# Patient Record
Sex: FEMALE | Race: WHITE | NOT HISPANIC OR LATINO | Employment: FULL TIME | URBAN - METROPOLITAN AREA
[De-identification: names, ages, dates, MRNs, and addresses within clinical notes are randomized per-mention and may not be internally consistent; named-entity substitution may affect disease eponyms.]

---

## 2018-04-30 ENCOUNTER — HISTORICAL (OUTPATIENT)
Dept: LAB | Facility: HOSPITAL | Age: 23
End: 2018-04-30

## 2018-06-20 LAB
BILIRUB SERPL-MCNC: NEGATIVE MG/DL
BLOOD URINE, POC: NEGATIVE
CLARITY, POC UA: CLEAR
COLOR, POC UA: YELLOW
GLUCOSE UR QL STRIP: NEGATIVE
KETONES UR QL STRIP: NEGATIVE
LEUKOCYTE EST, POC UA: NEGATIVE
NITRITE, POC UA: NEGATIVE
PH, POC UA: 6
PROTEIN, POC: NEGATIVE
SPECIFIC GRAVITY, POC UA: 1.02
UROBILINOGEN, POC UA: NORMAL

## 2018-06-21 ENCOUNTER — HISTORICAL (OUTPATIENT)
Dept: RADIOLOGY | Facility: HOSPITAL | Age: 23
End: 2018-06-21

## 2018-08-27 ENCOUNTER — HISTORICAL (OUTPATIENT)
Dept: ANESTHESIOLOGY | Facility: HOSPITAL | Age: 23
End: 2018-08-27

## 2018-09-11 LAB
ABS NEUT (OLG): 4.33 X10(3)/MCL (ref 2.1–9.2)
ALBUMIN SERPL-MCNC: 4.3 GM/DL (ref 3.4–5)
ALBUMIN/GLOB SERPL: 1.4 {RATIO}
ALP SERPL-CCNC: 53 UNIT/L (ref 38–126)
ALT SERPL-CCNC: 35 UNIT/L (ref 12–78)
AST SERPL-CCNC: 27 UNIT/L (ref 15–37)
BASOPHILS # BLD AUTO: 0 X10(3)/MCL (ref 0–0.2)
BASOPHILS NFR BLD AUTO: 1 %
BILIRUB SERPL-MCNC: 0.5 MG/DL (ref 0.2–1)
BILIRUBIN DIRECT+TOT PNL SERPL-MCNC: 0.1 MG/DL (ref 0–0.2)
BILIRUBIN DIRECT+TOT PNL SERPL-MCNC: 0.4 MG/DL (ref 0–0.8)
BUN SERPL-MCNC: 9 MG/DL (ref 7–18)
CALCIUM SERPL-MCNC: 9.1 MG/DL (ref 8.5–10.1)
CHLORIDE SERPL-SCNC: 110 MMOL/L (ref 98–107)
CO2 SERPL-SCNC: 23 MMOL/L (ref 21–32)
CREAT SERPL-MCNC: 0.59 MG/DL (ref 0.55–1.02)
EOSINOPHIL # BLD AUTO: 0.1 X10(3)/MCL (ref 0–0.9)
EOSINOPHIL NFR BLD AUTO: 1 %
ERYTHROCYTE [DISTWIDTH] IN BLOOD BY AUTOMATED COUNT: 12 % (ref 11.5–17)
GLOBULIN SER-MCNC: 3.1 GM/DL (ref 2.4–3.5)
GLUCOSE SERPL-MCNC: 80 MG/DL (ref 74–106)
HCT VFR BLD AUTO: 41.2 % (ref 37–47)
HGB BLD-MCNC: 13.4 GM/DL (ref 12–16)
LYMPHOCYTES # BLD AUTO: 2.8 X10(3)/MCL (ref 0.6–4.6)
LYMPHOCYTES NFR BLD AUTO: 36 %
MCH RBC QN AUTO: 31.2 PG (ref 27–31)
MCHC RBC AUTO-ENTMCNC: 32.5 GM/DL (ref 33–36)
MCV RBC AUTO: 96 FL (ref 80–94)
MONOCYTES # BLD AUTO: 0.5 X10(3)/MCL (ref 0.1–1.3)
MONOCYTES NFR BLD AUTO: 6 %
NEUTROPHILS # BLD AUTO: 4.33 X10(3)/MCL (ref 1.4–7.9)
NEUTROPHILS NFR BLD AUTO: 56 %
PLATELET # BLD AUTO: 309 X10(3)/MCL (ref 130–400)
PMV BLD AUTO: 10.3 FL (ref 9.4–12.4)
POTASSIUM SERPL-SCNC: 4.3 MMOL/L (ref 3.5–5.1)
PROT SERPL-MCNC: 7.4 GM/DL (ref 6.4–8.2)
RBC # BLD AUTO: 4.29 X10(6)/MCL (ref 4.2–5.4)
SODIUM SERPL-SCNC: 143 MMOL/L (ref 136–145)
WBC # SPEC AUTO: 7.8 X10(3)/MCL (ref 4.5–11.5)

## 2018-09-19 ENCOUNTER — HISTORICAL (OUTPATIENT)
Dept: ADMINISTRATIVE | Facility: HOSPITAL | Age: 23
End: 2018-09-19

## 2018-11-08 LAB
INFLUENZA A ANTIGEN, POC: POSITIVE
INFLUENZA B ANTIGEN, POC: NEGATIVE

## 2019-01-16 LAB
INFLUENZA A ANTIGEN, POC: NEGATIVE
INFLUENZA B ANTIGEN, POC: NEGATIVE

## 2019-03-08 ENCOUNTER — HISTORICAL (OUTPATIENT)
Dept: LAB | Facility: HOSPITAL | Age: 24
End: 2019-03-08

## 2019-03-08 LAB
ABS NEUT (OLG): 2.41 X10(3)/MCL (ref 2.1–9.2)
ALBUMIN SERPL-MCNC: 3.8 GM/DL (ref 3.4–5)
ALBUMIN/GLOB SERPL: 1.4 RATIO (ref 1.1–2)
ALP SERPL-CCNC: 47 UNIT/L (ref 38–126)
ALT SERPL-CCNC: 27 UNIT/L (ref 12–78)
AST SERPL-CCNC: 17 UNIT/L (ref 15–37)
BASOPHILS # BLD AUTO: 0 X10(3)/MCL (ref 0–0.2)
BASOPHILS NFR BLD AUTO: 1 %
BILIRUB SERPL-MCNC: 0.4 MG/DL (ref 0.2–1)
BILIRUBIN DIRECT+TOT PNL SERPL-MCNC: 0.1 MG/DL (ref 0–0.5)
BILIRUBIN DIRECT+TOT PNL SERPL-MCNC: 0.3 MG/DL (ref 0–0.8)
BUN SERPL-MCNC: 15 MG/DL (ref 7–18)
CALCIUM SERPL-MCNC: 9.3 MG/DL (ref 8.5–10.1)
CHLORIDE SERPL-SCNC: 110 MMOL/L (ref 98–107)
CO2 SERPL-SCNC: 22 MMOL/L (ref 21–32)
CREAT SERPL-MCNC: 0.6 MG/DL (ref 0.55–1.02)
EOSINOPHIL # BLD AUTO: 0.1 X10(3)/MCL (ref 0–0.9)
EOSINOPHIL NFR BLD AUTO: 1 %
ERYTHROCYTE [DISTWIDTH] IN BLOOD BY AUTOMATED COUNT: 12.3 % (ref 11.5–17)
GLOBULIN SER-MCNC: 2.8 GM/DL (ref 2.4–3.5)
GLUCOSE SERPL-MCNC: 78 MG/DL (ref 74–106)
HCT VFR BLD AUTO: 39.4 % (ref 37–47)
HGB BLD-MCNC: 12.6 GM/DL (ref 12–16)
LYMPHOCYTES # BLD AUTO: 3.7 X10(3)/MCL (ref 0.6–4.6)
LYMPHOCYTES NFR BLD AUTO: 56 %
MCH RBC QN AUTO: 30.3 PG (ref 27–31)
MCHC RBC AUTO-ENTMCNC: 32 GM/DL (ref 33–36)
MCV RBC AUTO: 94.7 FL (ref 80–94)
MONOCYTES # BLD AUTO: 0.4 X10(3)/MCL (ref 0.1–1.3)
MONOCYTES NFR BLD AUTO: 6 %
NEUTROPHILS # BLD AUTO: 2.41 X10(3)/MCL (ref 2.1–9.2)
NEUTROPHILS NFR BLD AUTO: 36 %
PLATELET # BLD AUTO: 291 X10(3)/MCL (ref 130–400)
PMV BLD AUTO: 10.7 FL (ref 9.4–12.4)
POTASSIUM SERPL-SCNC: 4 MMOL/L (ref 3.5–5.1)
PROT SERPL-MCNC: 6.6 GM/DL (ref 6.4–8.2)
RBC # BLD AUTO: 4.16 X10(6)/MCL (ref 4.2–5.4)
SODIUM SERPL-SCNC: 141 MMOL/L (ref 136–145)
WBC # SPEC AUTO: 6.6 X10(3)/MCL (ref 4.5–11.5)

## 2019-03-19 ENCOUNTER — HISTORICAL (OUTPATIENT)
Dept: RADIOLOGY | Facility: HOSPITAL | Age: 24
End: 2019-03-19

## 2019-07-29 ENCOUNTER — HISTORICAL (OUTPATIENT)
Dept: ADMINISTRATIVE | Facility: HOSPITAL | Age: 24
End: 2019-07-29

## 2019-10-23 ENCOUNTER — TELEPHONE (OUTPATIENT)
Dept: PHARMACY | Facility: CLINIC | Age: 24
End: 2019-10-23

## 2019-10-23 NOTE — TELEPHONE ENCOUNTER
LVM to notify the patient we received the prescription for Hizentra, and to see if she has any active prescription insurance. We will continue to follow up.

## 2019-11-14 NOTE — TELEPHONE ENCOUNTER
DOCUMENTATION ONLY:  Prior authorization for Hizentra approved from 11/7/19 to 11/6/20 for a maximum of 12 fills.    Case Id: [id number]    Co-pay: $465.00    Patient Assistance is required.

## 2019-12-30 NOTE — TELEPHONE ENCOUNTER
Contacted patient to check status of insurance for [employer]. She reports her new plan will be effective 1/1/20 and she will contact [facility] once she has her plan info. She has the contact info for [employer] but has not called yet to enroll as she is waiting for new plan to take effect. Will continue to follow up.

## 2020-01-02 ENCOUNTER — OFFICE VISIT (OUTPATIENT)
Dept: INTERNAL MEDICINE | Facility: CLINIC | Age: 25
End: 2020-01-02
Payer: COMMERCIAL

## 2020-01-02 VITALS
TEMPERATURE: 98 F | BODY MASS INDEX: 45.07 KG/M2 | HEART RATE: 93 BPM | HEIGHT: 62 IN | OXYGEN SATURATION: 98 % | SYSTOLIC BLOOD PRESSURE: 110 MMHG | WEIGHT: 244.94 LBS | DIASTOLIC BLOOD PRESSURE: 68 MMHG

## 2020-01-02 DIAGNOSIS — J01.00 ACUTE MAXILLARY SINUSITIS, RECURRENCE NOT SPECIFIED: Primary | ICD-10-CM

## 2020-01-02 DIAGNOSIS — R19.7 DIARRHEA, UNSPECIFIED TYPE: ICD-10-CM

## 2020-01-02 PROCEDURE — 99999 PR PBB SHADOW E&M-NEW PATIENT-LVL IV: ICD-10-PCS | Mod: PBBFAC,,, | Performed by: NURSE PRACTITIONER

## 2020-01-02 PROCEDURE — 99214 PR OFFICE/OUTPT VISIT, EST, LEVL IV, 30-39 MIN: ICD-10-PCS | Mod: S$GLB,,, | Performed by: NURSE PRACTITIONER

## 2020-01-02 PROCEDURE — 3008F BODY MASS INDEX DOCD: CPT | Mod: CPTII,S$GLB,, | Performed by: NURSE PRACTITIONER

## 2020-01-02 PROCEDURE — 3008F PR BODY MASS INDEX (BMI) DOCUMENTED: ICD-10-PCS | Mod: CPTII,S$GLB,, | Performed by: NURSE PRACTITIONER

## 2020-01-02 PROCEDURE — 99999 PR PBB SHADOW E&M-NEW PATIENT-LVL IV: CPT | Mod: PBBFAC,,, | Performed by: NURSE PRACTITIONER

## 2020-01-02 PROCEDURE — 99214 OFFICE O/P EST MOD 30 MIN: CPT | Mod: S$GLB,,, | Performed by: NURSE PRACTITIONER

## 2020-01-02 RX ORDER — METHYLPREDNISOLONE 4 MG/1
TABLET ORAL
Qty: 1 PACKAGE | Refills: 0 | Status: SHIPPED | OUTPATIENT
Start: 2020-01-02 | End: 2020-01-23

## 2020-01-02 RX ORDER — DOXYCYCLINE 100 MG/1
100 CAPSULE ORAL EVERY 12 HOURS
Qty: 14 CAPSULE | Refills: 0 | Status: SHIPPED | OUTPATIENT
Start: 2020-01-02 | End: 2020-01-09

## 2020-01-02 RX ORDER — MECLIZINE HYDROCHLORIDE 25 MG/1
25 TABLET ORAL 3 TIMES DAILY
COMMUNITY
Start: 2019-11-24 | End: 2020-02-24

## 2020-01-02 RX ORDER — AMITRIPTYLINE HYDROCHLORIDE 10 MG/1
10 TABLET, FILM COATED ORAL DAILY
COMMUNITY
Start: 2019-11-25 | End: 2020-01-20 | Stop reason: SDUPTHER

## 2020-01-02 RX ORDER — ASPIRIN 325 MG
25000 TABLET, DELAYED RELEASE (ENTERIC COATED) ORAL
COMMUNITY
Start: 2019-10-09 | End: 2020-06-09

## 2020-01-02 RX ORDER — MONTELUKAST SODIUM 10 MG/1
10 TABLET ORAL DAILY
COMMUNITY
Start: 2019-11-25 | End: 2020-07-31 | Stop reason: SDUPTHER

## 2020-01-02 RX ORDER — PANTOPRAZOLE SODIUM 40 MG/1
TABLET, DELAYED RELEASE ORAL
COMMUNITY
Start: 2019-12-07 | End: 2020-01-20 | Stop reason: SDUPTHER

## 2020-01-02 NOTE — PATIENT INSTRUCTIONS
Uncertain Causes of Diarrhea (Adult)    Diarrhea is when stools are loose and watery. This can be caused by:  · Viral infections  · Bacterial infections  · Food poisoning  · Parasites  · Irritable bowel syndrome (IBS)  · Inflammatory bowel diseases such as ulcerative colitis, Crohn's disease, and celiac disease  · Food intolerance, such as to lactose, the sugar found in milk and milk products  · Reaction to medicines like antibiotics, laxatives, cancer drugs, and antacids  Along with diarrhea, you may also have:  · Abdominal pain and cramping  · Nausea and vomiting  · Loss of bowel control  · Fever and chills  · Bloody stools  In some cases, antibiotics may help to treat diarrhea. You may have a stool sample test. This is done to see what is causing your diarrhea, and if antibiotics will help treat it. The results of a stool sample test may take up to 2 days. The healthcare provider may not give you antibiotics until he or she has the stool test results.  Diarrhea can cause dehydration. This is the loss of too much water and other fluids from the body. When this occurs, body fluid must be replaced. This can be done with oral rehydration solutions. Oral rehydration solutions are available at drugstores and grocery stores without a prescription.  Home care  Follow all instructions given by your healthcare provider. Rest at home for the next 24 hours, or until you feel better. Avoid caffeine, tobacco, and alcohol. These can make diarrhea, cramping, and pain worse.  If taking medicines:  · Dont take over-the-counter diarrhea or nausea medicines unless your healthcare provider tells you to.  · You may use acetaminophen or NSAID medicines like ibuprofen or naproxen to reduce pain and fever. Dont use these if you have chronic liver or kidney disease, or ever had a stomach ulcer or gastrointestinal bleeding. Don't use NSAID medicines if you are already taking one for another condition (like arthritis) or are on daily  aspirin therapy (such as for heart disease or after a stroke). Talk with your healthcare provider first.  · If antibiotics were prescribed, be sure you take them until they are finished. Dont stop taking them even when you feel better. Antibiotics must be taken as a full course.  To prevent the spread of illness:  · Remember that washing with soap and water and using alcohol-based  is the best way to prevent the spread of infection.  · Clean the toilet after each use.  · Wash your hands before eating.  · Wash your hands before and after preparing food. Keep in mind that people with diarrhea or vomiting should not prepare food for others.  · Wash your hands after using cutting boards, countertops, and knives that have been in contact with raw foods.  · Wash and then peel fruits and vegetables.  · Keep uncooked meats away from cooked and ready-to-eat foods.  · Use a food thermometer when cooking. Cook poultry to at least 165°F (74°C). Cook ground meat (beef, veal, pork, lamb) to at least 160°F (71°C). Cook fresh beef, veal, lamb, and pork to at least 145°F (63°C).  · Dont eat raw or undercooked eggs (poached or north side up), poultry, meat, or unpasteurized milk and juices.  Food and drinks  The main goal while treating vomiting or diarrhea is to prevent dehydration. This is done by taking small amounts of liquids often.  · Keep in mind that liquids are more important than food right now.  · Drink only small amounts of liquids at a time.  · Dont force yourself to eat, especially if you are having cramping, vomiting, or diarrhea. Dont eat large amounts at a time, even if you are hungry.  · If you eat, avoid fatty, greasy, spicy, or fried foods.  · Dont eat dairy foods or drink milk if you have diarrhea. These can make diarrhea worse.  During the first 24 hours you can try:  · Oral rehydration solutions. Do not use sports drinks. They have too much sugar and not enough electrolytes.  · Soft drinks without  caffeine  · Ginger ale  · Water (plain or flavored)  · Decaf tea or coffee  · Clear broth, consommé, or bouillon  · Gelatin, popsicles, or frozen fruit juice bars  The second 24 hours, if you are feeling better, you can add:  · Hot cereal, plain toast, bread, rolls, or crackers  · Plain noodles, rice, mashed potatoes, chicken noodle soup, or rice soup  · Unsweetened canned fruit (no pineapple)  · Bananas  As you recover:  · Limit fat intake to less than 15 grams per day. Dont eat margarine, butter, oils, mayonnaise, sauces, gravies, fried foods, peanut butter, meat, poultry, or fish.  · Limit fiber. Dont eat raw or cooked vegetables, fresh fruits except bananas, or bran cereals.  · Limit caffeine and chocolate.  · Limit dairy.  · Dont use spices or seasonings except salt.  · Go back to your normal diet over time, as you feel better and your symptoms improve.  · If the symptoms come back, go back to a simple diet or clear liquids.  Follow-up care  Follow up with your healthcare provider, or as advised. If a stool sample was taken or cultures were done, call the healthcare provider for the results as instructed.  Call 911  Call 911 if you have any of these symptoms:  · Trouble breathing  · Confusion  · Extreme drowsiness or trouble walking  · Loss of consciousness  · Rapid heart rate  · Chest pain  · Stiff neck  · Seizure  When to seek medical advice  Call your healthcare provider right away if any of these occur:  · Abdominal pain that gets worse  · Constant lower right abdominal pain  · Continued vomiting and inability to keep liquids down  · Diarrhea more than 5 times a day  · Blood in vomit or stool  · Dark urine or no urine for 8 hours, dry mouth and tongue, tiredness, weakness, or dizziness  · Drowsiness  · New rash  · You dont get better in 2 to 3 days  · Fever of 100.4°F (38°C) or higher that doesnt get lower with medicine  Date Last Reviewed: 1/3/2016  © 0934-2633 [employer]. [address] [place] [address]  [place] [address]. All rights reserved. This information is not intended as a substitute for professional medical care. Always follow your healthcare professional's instructions.        Sinusitis (Antibiotic Treatment)    The sinuses are air-filled spaces within the bones of the face. They connect to the inside of the nose. Sinusitis is an inflammation of the tissue lining the sinus cavity. Sinus inflammation can occur during a cold. It can also be due to allergies to pollens and other particles in the air. Sinusitis can cause symptoms of sinus congestion and fullness. A sinus infection causes fever, headache and facial pain. There is often green or yellow drainage from the nose or into the back of the throat (post-nasal drip). You have been given antibiotics to treat this condition.  Home care:  · Take the full course of antibiotics as instructed. Do not stop taking them, even if you feel better.  · Drink plenty of water, hot tea, and other liquids. This may help thin mucus. It also may promote sinus drainage.  · Heat may help soothe painful areas of the face. Use a towel soaked in hot water. Or,  the shower and direct the hot spray onto your face. Using a vaporizer along with a menthol rub at night may also help.   · An expectorant containing guaifenesin may help thin the mucus and promote drainage from the sinuses.  · Over-the-counter decongestants may be used unless a similar medicine was prescribed. Nasal sprays work the fastest. Use one that contains phenylephrine or oxymetazoline. First blow the nose gently. Then use the spray. Do not use these medicines more often than directed on the label or symptoms may get worse. You may also use tablets containing pseudoephedrine. Avoid products that combine ingredients, because side effects may be increased. Read labels. You can also ask the pharmacist for help. (NOTE: Persons with high blood pressure should not use decongestants. They can raise  blood pressure.)  · Over-the-counter antihistamines may help if allergies contributed to your sinusitis.    · Do not use nasal rinses or irrigation during an acute sinus infection, unless told to by your health care provider. Rinsing may spread the infection to other sinuses.  · Use acetaminophen or ibuprofen to control pain, unless another pain medicine was prescribed. (If you have chronic liver or kidney disease or ever had a stomach ulcer, talk with your doctor before using these medicines. Aspirin should never be used in anyone under 18 years of age who is ill with a fever. It may cause severe liver damage.)  · Don't smoke. This can worsen symptoms.  Follow-up care  Follow up with your healthcare provider or our staff if you are not improving within the next week.  When to seek medical advice  Call your healthcare provider if any of these occur:  · Facial pain or headache becoming more severe  · Stiff neck  · Unusual drowsiness or confusion  · Swelling of the forehead or eyelids  · Vision problems, including blurred or double vision  · Fever of 100.4ºF (38ºC) or higher, or as directed by your healthcare provider  · Seizure  · Breathing problems  · Symptoms not resolving within 10 days  Date Last Reviewed: 4/13/2015  © 5235-1281 [employer]. [address] [place] [address], [place] [address]. All rights reserved. This information is not intended as a substitute for professional medical care. Always follow your healthcare professional's instructions.

## 2020-01-02 NOTE — PROGRESS NOTES
Subjective:       Patient ID: Isaías Rush is a 24 y.o. female.    Chief Complaint: Nasal Congestion and Cough    URI    This is a new problem. The current episode started in the past 7 days. The problem has been gradually worsening. There has been no fever. Associated symptoms include congestion, coughing, ear pain, headaches, a plugged ear sensation, rhinorrhea, sinus pain and swollen glands. Pertinent negatives include no abdominal pain, chest pain, diarrhea, dysuria, joint pain, joint swelling, nausea, neck pain, rash, sneezing, sore throat, vomiting or wheezing. She has tried acetaminophen, antihistamine, increased fluids and NSAIDs for the symptoms. The treatment provided mild relief.           History reviewed. No pertinent past medical history.  Past Surgical History:   Procedure Laterality Date    GALLBLADDER SURGERY Bilateral 09/2018    WRIST ARTHROPLASTY Bilateral 2009     Social History     Socioeconomic History    Marital status:      Spouse name: Not on file    Number of children: Not on file    Years of education: Not on file    Highest education level: Not on file   Occupational History    Not on file   Social Needs    Financial resource strain: Not on file    Food insecurity:     Worry: Not on file     Inability: Not on file    Transportation needs:     Medical: Not on file     Non-medical: Not on file   Tobacco Use    Smoking status: Never Smoker    Smokeless tobacco: Never Used   Substance and Sexual Activity    Alcohol use: Yes     Alcohol/week: 2.0 standard drinks     Types: 2 Glasses of wine per week    Drug use: Never    Sexual activity: Yes     Partners: Male   Lifestyle    Physical activity:     Days per week: Not on file     Minutes per session: Not on file    Stress: Not on file   Relationships    Social connections:     Talks on phone: Not on file     Gets together: Not on file     Attends [employer] service: Not on file     Active member of club or organization:  Not on file     Attends meetings of clubs or organizations: Not on file     Relationship status: Not on file   Other Topics Concern    Not on file   Social History Narrative    Not on file     Review of patient's allergies indicates:   Allergen Reactions    Cefprozil Hives     Current Outpatient Medications   Medication Sig    amitriptyline (ELAVIL) 10 MG tablet Take 10 mg by mouth once daily.    cholecalciferol, vitamin D3, 50,000 unit capsule TAKE 1 CAPSULE BY MOUTH WEEKLY FOR 12 WEEKS    meclizine (ANTIVERT) 25 mg tablet Take 25 mg by mouth 3 (three) times daily.    montelukast (SINGULAIR) 10 mg tablet Take 10 mg by mouth once daily.    pantoprazole (PROTONIX) 40 MG tablet TAKE 1 TABLET BY MOUTH AT LEAST 30 MINUTES BEFORE BREAKFAST.    doxycycline (VIBRAMYCIN) 100 MG Cap Take 1 capsule (100 mg total) by mouth every 12 (twelve) hours. for 7 days    methylPREDNISolone (MEDROL DOSEPACK) 4 mg tablet use as directed     No current facility-administered medications for this visit.            Review of Systems   Constitutional: Positive for fever. Negative for activity change, appetite change, chills, diaphoresis, fatigue and unexpected weight change.   HENT: Positive for congestion, ear pain, postnasal drip, rhinorrhea, sinus pressure and sinus pain. Negative for dental problem, drooling, ear discharge, facial swelling, hearing loss, mouth sores, nosebleeds, sneezing, sore throat, tinnitus, trouble swallowing and voice change.    Eyes: Negative for photophobia, pain and visual disturbance.   Respiratory: Positive for cough. Negative for choking, chest tightness, shortness of breath and wheezing.    Cardiovascular: Negative for chest pain, palpitations and leg swelling.   Gastrointestinal: Negative for abdominal distention, abdominal pain, constipation, diarrhea, nausea and vomiting.   Genitourinary: Negative for decreased urine volume, difficulty urinating, dysuria, flank pain, frequency, hematuria and  urgency.   Musculoskeletal: Negative for arthralgias, back pain, joint pain, joint swelling, neck pain and neck stiffness.   Skin: Negative for rash.   Allergic/Immunologic: Negative for immunocompromised state.   Neurological: Positive for headaches. Negative for dizziness, tremors, seizures, syncope, facial asymmetry, speech difficulty, weakness, light-headedness and numbness.   Hematological: Negative for adenopathy. Does not bruise/bleed easily.   Psychiatric/Behavioral: Positive for sleep disturbance. Negative for confusion.       Objective:      Physical Exam   Constitutional: She is oriented to person, place, and time. She appears well-developed and well-nourished. No distress.   HENT:   Head: Normocephalic and atraumatic.   Right Ear: Tympanic membrane normal.   Left Ear: Tympanic membrane normal.   Nose: Mucosal edema, rhinorrhea and sinus tenderness present.   Mouth/Throat: No oral lesions. No uvula swelling. Posterior oropharyngeal erythema present. No oropharyngeal exudate.   Beefy red appearance to nasal mucosa with purulent discharge. Pain noted when head is lowered.   Eyes: Pupils are equal, round, and reactive to light. Conjunctivae and EOM are normal. Right eye exhibits no discharge. Left eye exhibits no discharge.   Neck: Normal range of motion. Neck supple.   Cardiovascular: Normal rate, regular rhythm, normal heart sounds and intact distal pulses. Exam reveals no gallop and no friction rub.   No murmur heard.  Pulmonary/Chest: Effort normal and breath sounds normal. No respiratory distress. She has no wheezes. She has no rales.   Abdominal: Soft. Bowel sounds are normal.   Musculoskeletal: Normal range of motion.   Neurological: She is alert and oriented to person, place, and time.   Skin: Skin is warm and dry.       Assessment:     Vitals:    01/02/20 1054   BP: 110/68   Pulse: 93   Temp: 98 °F (36.7 °C)         1. Acute maxillary sinusitis, recurrence not specified    2. Diarrhea, unspecified  type        Plan:   Acute maxillary sinusitis, recurrence not specified  -     doxycycline (VIBRAMYCIN) 100 MG Cap; Take 1 capsule (100 mg total) by mouth every 12 (twelve) hours. for 7 days  Dispense: 14 capsule; Refill: 0  -     methylPREDNISolone (MEDROL DOSEPACK) 4 mg tablet; use as directed  Dispense: 1 Package; Refill: 0    Diarrhea, unspecified type        As above  AVS given and discussed  Return PRN

## 2020-01-10 ENCOUNTER — LAB VISIT (OUTPATIENT)
Dept: LAB | Facility: HOSPITAL | Age: 25
End: 2020-01-10
Attending: ALLERGY & IMMUNOLOGY
Payer: COMMERCIAL

## 2020-01-10 ENCOUNTER — OFFICE VISIT (OUTPATIENT)
Dept: ALLERGY | Facility: CLINIC | Age: 25
End: 2020-01-10
Payer: COMMERCIAL

## 2020-01-10 VITALS
SYSTOLIC BLOOD PRESSURE: 125 MMHG | BODY MASS INDEX: 44.16 KG/M2 | RESPIRATION RATE: 11 BRPM | HEART RATE: 84 BPM | DIASTOLIC BLOOD PRESSURE: 80 MMHG | WEIGHT: 240 LBS | HEIGHT: 62 IN

## 2020-01-10 DIAGNOSIS — T63.421D FIRE ANT BITE, ACCIDENTAL OR UNINTENTIONAL, SUBSEQUENT ENCOUNTER: Primary | ICD-10-CM

## 2020-01-10 DIAGNOSIS — J45.20 MILD INTERMITTENT ASTHMA, UNSPECIFIED WHETHER COMPLICATED: ICD-10-CM

## 2020-01-10 DIAGNOSIS — D80.6 SPECIFIC ANTIBODY DEFICIENCY WITH NORMAL IG CONCENTRATION AND NORMAL NUMBER OF B CELLS: ICD-10-CM

## 2020-01-10 DIAGNOSIS — T63.421D FIRE ANT BITE, ACCIDENTAL OR UNINTENTIONAL, SUBSEQUENT ENCOUNTER: ICD-10-CM

## 2020-01-10 DIAGNOSIS — J30.1 SEASONAL ALLERGIC RHINITIS DUE TO POLLEN: ICD-10-CM

## 2020-01-10 PROCEDURE — 86003 ALLG SPEC IGE CRUDE XTRC EA: CPT

## 2020-01-10 PROCEDURE — 99204 PR OFFICE/OUTPT VISIT, NEW, LEVL IV, 45-59 MIN: ICD-10-PCS | Mod: S$GLB,,, | Performed by: ALLERGY & IMMUNOLOGY

## 2020-01-10 PROCEDURE — 99999 PR PBB SHADOW E&M-EST. PATIENT-LVL III: ICD-10-PCS | Mod: PBBFAC,,, | Performed by: ALLERGY & IMMUNOLOGY

## 2020-01-10 PROCEDURE — 99999 PR PBB SHADOW E&M-EST. PATIENT-LVL III: CPT | Mod: PBBFAC,,, | Performed by: ALLERGY & IMMUNOLOGY

## 2020-01-10 PROCEDURE — 3008F BODY MASS INDEX DOCD: CPT | Mod: CPTII,S$GLB,, | Performed by: ALLERGY & IMMUNOLOGY

## 2020-01-10 PROCEDURE — 3008F PR BODY MASS INDEX (BMI) DOCUMENTED: ICD-10-PCS | Mod: CPTII,S$GLB,, | Performed by: ALLERGY & IMMUNOLOGY

## 2020-01-10 PROCEDURE — 36415 COLL VENOUS BLD VENIPUNCTURE: CPT

## 2020-01-10 PROCEDURE — 99204 OFFICE O/P NEW MOD 45 MIN: CPT | Mod: S$GLB,,, | Performed by: ALLERGY & IMMUNOLOGY

## 2020-01-10 RX ORDER — MONTELUKAST SODIUM 10 MG/1
10 TABLET ORAL NIGHTLY
Qty: 30 TABLET | Refills: 11 | Status: SHIPPED | OUTPATIENT
Start: 2020-01-10 | End: 2020-08-13

## 2020-01-10 NOTE — PROGRESS NOTES
"Subjective:       Patient ID: Isaías Rush is a 24 y.o. female.    Chief Complaint:  Allergies      HPI:  24 year old female - Singulair "really controls my asthma well". She states that she was diagnosed with "specific antibody deficiency disorder". She reports that she was getting 'a shot" at home. She thinks she was on Privigen.  She states that she was having frequent infections- colds, bronchitis, 4 ear infections last year. She had Prevnar and PPSV23, which did not raise her titers. She was on prophylactic antibiotics, but they made her ill, so she started subcutaneous gammoglobulin.   Singulair improves her asthma. She last used her albuterol inhaler a year ago. She has not had nebulizer treatments in a year.  She takes Flonase, more consistently during the Spring.  She denies eczema.  She did take allergy immunotherapy-SCIT.  Fire ant - 18 months old- she stepped in a nest and had anaphylaxis.  She was seeing Dr. Moore in [facility].  She has a Barley and Wheat allergy. She avoids Barley and Wheat. She did not have anaphylaxis. She was tested because of an increase in mast cells in the GI tract.         Past Medical History:  Specific Antibody deficiency  Asthma  Allergic rhinitis- pollen      Family History:  Asthma- Grandmother  Great grandparents- heart disease  Daibetes  Mom- eye disease  Grandmother-Eoe    Review of patient's allergies indicates:   Allergen Reactions    Cefprozil Hives    Fire ant      anaphylaxis     Current Outpatient Medications on File Prior to Visit   Medication Sig Dispense Refill    amitriptyline (ELAVIL) 10 MG tablet Take 10 mg by mouth once daily.      montelukast (SINGULAIR) 10 mg tablet Take 10 mg by mouth once daily.      pantoprazole (PROTONIX) 40 MG tablet TAKE 1 TABLET BY MOUTH AT LEAST 30 MINUTES BEFORE BREAKFAST.      cholecalciferol, vitamin D3, 50,000 unit capsule TAKE 1 CAPSULE BY MOUTH WEEKLY FOR 12 WEEKS      [] doxycycline (VIBRAMYCIN) 100 MG Cap Take 1 " capsule (100 mg total) by mouth every 12 (twelve) hours. for 7 days 14 capsule 0    meclizine (ANTIVERT) 25 mg tablet Take 25 mg by mouth 3 (three) times daily.      methylPREDNISolone (MEDROL DOSEPACK) 4 mg tablet use as directed (Patient not taking: Reported on 1/10/2020) 1 Package 0     No current facility-administered medications on file prior to visit.          Environmental History: 2 dogs  Review of Systems   Constitutional: Negative for chills and fever.   HENT: Negative for congestion and rhinorrhea.    Eyes: Negative for discharge and itching.   Respiratory: Negative for chest tightness, shortness of breath and wheezing.    Cardiovascular: Negative for chest pain.   Gastrointestinal: Negative for diarrhea and vomiting.   Endocrine: Negative for cold intolerance and heat intolerance.   Musculoskeletal: Negative for arthralgias and gait problem.   Skin: Negative for rash and wound.   Allergic/Immunologic: Positive for environmental allergies and food allergies.   Neurological: Negative for dizziness and light-headedness.   Hematological: Negative for adenopathy. Does not bruise/bleed easily.   Psychiatric/Behavioral: Negative for agitation and confusion.        Objective:    Physical Exam   Constitutional: She is oriented to person, place, and time. She appears well-developed and well-nourished. No distress.   HENT:   Head: Normocephalic and atraumatic.   Right Ear: External ear normal.   Left Ear: External ear normal.   Nose: Nose normal.   Mouth/Throat: Oropharynx is clear and moist. No oropharyngeal exudate.   Eyes: Pupils are equal, round, and reactive to light. Right eye exhibits no discharge. Left eye exhibits no discharge. No scleral icterus.   Neck: Normal range of motion. Neck supple. No tracheal deviation present. No thyromegaly present.   Cardiovascular: Normal rate, regular rhythm and normal heart sounds.   No murmur heard.  Pulmonary/Chest: Effort normal and breath sounds normal. No stridor.  No respiratory distress. She has no wheezes. She has no rales.   Abdominal: Soft. Bowel sounds are normal. She exhibits no distension. There is no tenderness. There is no guarding.   Musculoskeletal: Normal range of motion. She exhibits no edema.   Lymphadenopathy:     She has no cervical adenopathy.   Neurological: She is alert and oriented to person, place, and time.   Skin: Skin is warm. No rash noted. She is not diaphoretic. No erythema. No pallor.   Psychiatric: She has a normal mood and affect. Her behavior is normal. Judgment and thought content normal.   Vitals reviewed.             Assessment:       1. Fire ant bite, accidental or unintentional, subsequent encounter    2. Specific antibody deficiency with normal IG concentration and normal number of B cells    3. Seasonal allergic rhinitis due to pollen    4. Mild intermittent asthma, unspecified whether complicated         Plan:       She signed a medical release to obtain information from Dr. Moore.  Refilled Singulair.  Continue current medications.  Fire ant bite, accidental or unintentional, subsequent encounter  -     Fire ant (Solenopsis invicta) IgE; Future; Expected date: 01/10/2020  -     EPINEPHrine (SYMJEPI) 0.3 mg/0.3 mL Syrg; Inject 0.3 mLs (0.3 mg total) as directed as needed.  Dispense: 2 each; Refill: 3    Specific antibody deficiency with normal IG concentration and normal number of B cells    Seasonal allergic rhinitis due to pollen  -     montelukast (SINGULAIR) 10 mg tablet; Take 1 tablet (10 mg total) by mouth every evening.  Dispense: 30 tablet; Refill: 11    Mild intermittent asthma, unspecified whether complicated  -     montelukast (SINGULAIR) 10 mg tablet; Take 1 tablet (10 mg total) by mouth every evening.  Dispense: 30 tablet; Refill: 11    strict avoidance of fire ants.  RTC 3 months or sooner, if needed.

## 2020-01-10 NOTE — PATIENT INSTRUCTIONS
I recommend having an Epinephrine Auto-injector with you at all times. If needed, do not hesitate to use it. Place mid-lateral thigh and hold for 10 seconds. Call 911. You must go to the hospital via ambulance for a higher level of care and monitoring.  Do not stick your finger with the Epinephrine Auto-injector.  Do not leave the Epinephrine Auto-injector in a car and/or  hot/cold environment, as it can denature.    Singulair refilled

## 2020-01-14 ENCOUNTER — PATIENT MESSAGE (OUTPATIENT)
Dept: ALLERGY | Facility: CLINIC | Age: 25
End: 2020-01-14

## 2020-01-14 LAB
DEPRECATED RED IMP FIRE ANT IGE RAST QL: ABNORMAL
RED IMP FIRE ANT IGE QN: 0.7 KU/L

## 2020-01-20 ENCOUNTER — TELEPHONE (OUTPATIENT)
Dept: ALLERGY | Facility: CLINIC | Age: 25
End: 2020-01-20

## 2020-01-20 ENCOUNTER — OFFICE VISIT (OUTPATIENT)
Dept: GASTROENTEROLOGY | Facility: CLINIC | Age: 25
End: 2020-01-20
Payer: COMMERCIAL

## 2020-01-20 VITALS — WEIGHT: 247.38 LBS | HEIGHT: 62 IN | BODY MASS INDEX: 45.52 KG/M2

## 2020-01-20 DIAGNOSIS — K21.9 GASTROESOPHAGEAL REFLUX DISEASE, ESOPHAGITIS PRESENCE NOT SPECIFIED: Primary | ICD-10-CM

## 2020-01-20 DIAGNOSIS — Z87.11 HX OF GASTRIC ULCER: ICD-10-CM

## 2020-01-20 DIAGNOSIS — R11.0 NAUSEA: ICD-10-CM

## 2020-01-20 PROCEDURE — 3008F PR BODY MASS INDEX (BMI) DOCUMENTED: ICD-10-PCS | Mod: CPTII,S$GLB,, | Performed by: INTERNAL MEDICINE

## 2020-01-20 PROCEDURE — 99999 PR PBB SHADOW E&M-EST. PATIENT-LVL II: CPT | Mod: PBBFAC,,, | Performed by: INTERNAL MEDICINE

## 2020-01-20 PROCEDURE — 99203 OFFICE O/P NEW LOW 30 MIN: CPT | Mod: S$GLB,,, | Performed by: INTERNAL MEDICINE

## 2020-01-20 PROCEDURE — 99999 PR PBB SHADOW E&M-EST. PATIENT-LVL II: ICD-10-PCS | Mod: PBBFAC,,, | Performed by: INTERNAL MEDICINE

## 2020-01-20 PROCEDURE — 99203 PR OFFICE/OUTPT VISIT, NEW, LEVL III, 30-44 MIN: ICD-10-PCS | Mod: S$GLB,,, | Performed by: INTERNAL MEDICINE

## 2020-01-20 PROCEDURE — 3008F BODY MASS INDEX DOCD: CPT | Mod: CPTII,S$GLB,, | Performed by: INTERNAL MEDICINE

## 2020-01-20 RX ORDER — PANTOPRAZOLE SODIUM 40 MG/1
TABLET, DELAYED RELEASE ORAL
Qty: 30 TABLET | Refills: 1 | Status: SHIPPED | OUTPATIENT
Start: 2020-01-20 | End: 2020-06-09

## 2020-01-20 RX ORDER — AMITRIPTYLINE HYDROCHLORIDE 10 MG/1
10 TABLET, FILM COATED ORAL DAILY
Qty: 30 TABLET | Refills: 1 | Status: SHIPPED | OUTPATIENT
Start: 2020-01-20 | End: 2020-02-24

## 2020-01-20 NOTE — PROGRESS NOTES
Subjective:       Patient ID: Isaías Rush is a 24 y.o. female.    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 She was taken to Endoscopy and on EGD was found to have a Gastric ulcer. It was felt not to be substantial enough to be responsible for her pain. She was finally treated with Amytriptaline with diagnosis uncertain to treat what was an unclear diagnosis. The pain continues now but to,lesser extent than previous.    The pain is sharp in chraracter with point distribution just beneath her rib. It seems to occur once weekly; at times more frequent. There is no clear aggrevating or alleviating factors. There is no association between meals or BMs. Pressure over the area seems to affect pain but no particular movement or positioning.     Appetite is good. There is no weight loss. There is no BRBPR or melena. There is nno change in bowel habits. There is no aversion to the sight or smell of food. There is nausea but no vomiting. The nausea was significant prior to GB removal, but resolved afterwards or at least became less frequent. There seems to be some increase in nausea lately with meals but this is unrelated to pain.        Review of Systems   Constitutional: Negative for activity change, appetite change, chills, diaphoresis, fatigue, fever and unexpected weight change.   HENT: Negative for congestion, ear discharge, ear pain, hearing loss, nosebleeds, postnasal drip and tinnitus.    Eyes: Negative for photophobia and visual disturbance.   Respiratory: Negative for apnea, cough, choking, chest tightness, shortness of breath and wheezing.    Cardiovascular: Negative for chest pain, palpitations and leg swelling.   Gastrointestinal: Positive for abdominal pain and nausea. Negative for abdominal distention, anal bleeding, blood in stool, constipation, diarrhea, rectal  pain and vomiting.   Genitourinary: Negative for difficulty urinating, dyspareunia, dysuria, flank pain, frequency, hematuria, menstrual problem, pelvic pain, urgency, vaginal bleeding and vaginal discharge.   Musculoskeletal: Negative for arthralgias, back pain, gait problem, joint swelling, myalgias and neck stiffness.   Skin: Negative for pallor and rash.   Neurological: Negative for dizziness, tremors, seizures, syncope, speech difficulty, weakness, numbness and headaches.   Hematological: Negative for adenopathy.   Psychiatric/Behavioral: Negative for agitation, confusion, hallucinations, sleep disturbance and suicidal ideas.       Objective:      Physical Exam   Constitutional: She is oriented to person, place, and time. She appears well-developed and well-nourished.   Morbid Obesity   HENT:   Head: Normocephalic and atraumatic.   Eyes: Pupils are equal, round, and reactive to light. Conjunctivae and EOM are normal. Right eye exhibits no discharge. Left eye exhibits no discharge. No scleral icterus.   Neck: Normal range of motion. Neck supple. No JVD present. No thyromegaly present.   Cardiovascular: Normal rate, regular rhythm, normal heart sounds and intact distal pulses. Exam reveals no gallop and no friction rub.   No murmur heard.  Pulmonary/Chest: Effort normal and breath sounds normal. No respiratory distress. She has no wheezes. She has no rales. She exhibits no tenderness.   Abdominal: Soft. Bowel sounds are normal. She exhibits no distension and no mass. There is no tenderness. There is no rebound and no guarding.   Musculoskeletal: Normal range of motion. She exhibits no edema.   Lymphadenopathy:     She has no cervical adenopathy.   Neurological: She is alert and oriented to person, place, and time. She has normal reflexes. She exhibits normal muscle tone. Coordination normal.   Skin: Skin is warm and dry. No rash noted. No erythema. No pallor.   Psychiatric: She has a normal mood and affect. Her  behavior is normal. Judgment and thought content normal.   Vitals reviewed.      Assessment:   No diagnosis found.   Gastroesophageal reflux disease, esophagitis presence not specified  -     Case request GI: EGD (ESOPHAGOGASTRODUODENOSCOPY)  -     pantoprazole (PROTONIX) 40 MG tablet; TAKE 1 TABLET BY MOUTH AT LEAST 30 MINUTES BEFORE BREAKFAST.  Dispense: 30 tablet; Refill: 1    Nausea  -     Case request GI: EGD (ESOPHAGOGASTRODUODENOSCOPY)  -     pantoprazole (PROTONIX) 40 MG tablet; TAKE 1 TABLET BY MOUTH AT LEAST 30 MINUTES BEFORE BREAKFAST.  Dispense: 30 tablet; Refill: 1    Hx of gastric ulcer  -     Case request GI: EGD (ESOPHAGOGASTRODUODENOSCOPY)  -     pantoprazole (PROTONIX) 40 MG tablet; TAKE 1 TABLET BY MOUTH AT LEAST 30 MINUTES BEFORE BREAKFAST.  Dispense: 30 tablet; Refill: 1    Other orders  -     amitriptyline (ELAVIL) 10 MG tablet; Take 1 tablet (10 mg total) by mouth once daily.  Dispense: 30 tablet; Refill: 1        Plan:   As above.

## 2020-01-20 NOTE — TELEPHONE ENCOUNTER
I called patient to inform her of 's My chart message not being. Read. I left message on voice mail for patient to call and discuss.

## 2020-01-21 ENCOUNTER — CLINICAL SUPPORT (OUTPATIENT)
Dept: ALLERGY | Facility: CLINIC | Age: 25
End: 2020-01-21
Payer: COMMERCIAL

## 2020-01-21 DIAGNOSIS — T63.421D FIRE ANT BITE, ACCIDENTAL OR UNINTENTIONAL, SUBSEQUENT ENCOUNTER: ICD-10-CM

## 2020-01-22 NOTE — PROGRESS NOTES
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 She has had 5 years of allergy immunotherapy for fire ant. She has been stung/bitten numerous times since, without experiencing anaphylaxis. She is not interested in repeating allergy immunotherapy for fire ant.   Considering her history the risk may outweigh the benefit. Will not start AIT for fire ant.

## 2020-02-03 ENCOUNTER — TELEPHONE (OUTPATIENT)
Dept: ALLERGY | Facility: CLINIC | Age: 25
End: 2020-02-03

## 2020-02-03 NOTE — TELEPHONE ENCOUNTER
Left pt a message to call our office. Message sent via portal.    Need to confirm her chart at Dr Moore's office is under the name Isaías Garcia.

## 2020-02-04 ENCOUNTER — ANESTHESIA EVENT (OUTPATIENT)
Dept: ENDOSCOPY | Facility: HOSPITAL | Age: 25
End: 2020-02-04
Payer: COMMERCIAL

## 2020-02-04 PROBLEM — K21.9 GERD (GASTROESOPHAGEAL REFLUX DISEASE): Status: ACTIVE | Noted: 2020-02-04

## 2020-02-04 PROBLEM — Z87.11 HISTORY OF GASTRIC ULCER: Status: ACTIVE | Noted: 2020-02-04

## 2020-02-04 PROBLEM — R11.0 NAUSEA: Status: ACTIVE | Noted: 2020-02-04

## 2020-02-04 RX ORDER — SODIUM CHLORIDE, SODIUM LACTATE, POTASSIUM CHLORIDE, CALCIUM CHLORIDE 600; 310; 30; 20 MG/100ML; MG/100ML; MG/100ML; MG/100ML
INJECTION, SOLUTION INTRAVENOUS CONTINUOUS
Status: CANCELLED | OUTPATIENT
Start: 2020-02-04

## 2020-02-04 RX ORDER — SODIUM CHLORIDE 0.9 % (FLUSH) 0.9 %
10 SYRINGE (ML) INJECTION
Status: CANCELLED | OUTPATIENT
Start: 2020-02-04

## 2020-02-05 ENCOUNTER — HOSPITAL ENCOUNTER (OUTPATIENT)
Facility: HOSPITAL | Age: 25
Discharge: HOME | End: 2020-02-05
Attending: INTERNAL MEDICINE | Admitting: INTERNAL MEDICINE
Payer: COMMERCIAL

## 2020-02-05 ENCOUNTER — ANESTHESIA (OUTPATIENT)
Dept: ENDOSCOPY | Facility: HOSPITAL | Age: 25
End: 2020-02-05
Payer: COMMERCIAL

## 2020-02-05 VITALS
HEIGHT: 62 IN | BODY MASS INDEX: 44.91 KG/M2 | TEMPERATURE: 97 F | WEIGHT: 244.06 LBS | HEART RATE: 75 BPM | SYSTOLIC BLOOD PRESSURE: 142 MMHG | RESPIRATION RATE: 18 BRPM | DIASTOLIC BLOOD PRESSURE: 84 MMHG | OXYGEN SATURATION: 97 %

## 2020-02-05 DIAGNOSIS — R11.0 NAUSEA: ICD-10-CM

## 2020-02-05 DIAGNOSIS — Z87.11 HISTORY OF GASTRIC ULCER: ICD-10-CM

## 2020-02-05 DIAGNOSIS — K21.9 GASTROESOPHAGEAL REFLUX DISEASE, ESOPHAGITIS PRESENCE NOT SPECIFIED: Primary | ICD-10-CM

## 2020-02-05 DIAGNOSIS — K21.9 GERD (GASTROESOPHAGEAL REFLUX DISEASE): ICD-10-CM

## 2020-02-05 PROCEDURE — 43239 PR EGD, FLEX, W/BIOPSY, SGL/MULTI: ICD-10-PCS | Mod: ,,, | Performed by: INTERNAL MEDICINE

## 2020-02-05 PROCEDURE — 27201012 HC FORCEPS, HOT/COLD, DISP: Performed by: INTERNAL MEDICINE

## 2020-02-05 PROCEDURE — 37000008 HC ANESTHESIA 1ST 15 MINUTES: Performed by: INTERNAL MEDICINE

## 2020-02-05 PROCEDURE — 88305 TISSUE EXAM BY PATHOLOGIST: CPT | Performed by: PATHOLOGY

## 2020-02-05 PROCEDURE — 88305 TISSUE EXAM BY PATHOLOGIST: ICD-10-PCS | Mod: 26,,, | Performed by: PATHOLOGY

## 2020-02-05 PROCEDURE — 25000003 PHARM REV CODE 250: Performed by: NURSE ANESTHETIST, CERTIFIED REGISTERED

## 2020-02-05 PROCEDURE — 43239 EGD BIOPSY SINGLE/MULTIPLE: CPT | Performed by: INTERNAL MEDICINE

## 2020-02-05 PROCEDURE — 88342 CHG IMMUNOCYTOCHEMISTRY: ICD-10-PCS | Mod: 26,,, | Performed by: PATHOLOGY

## 2020-02-05 PROCEDURE — 88342 IMHCHEM/IMCYTCHM 1ST ANTB: CPT | Performed by: PATHOLOGY

## 2020-02-05 PROCEDURE — 43239 EGD BIOPSY SINGLE/MULTIPLE: CPT | Mod: ,,, | Performed by: INTERNAL MEDICINE

## 2020-02-05 PROCEDURE — 88342 IMHCHEM/IMCYTCHM 1ST ANTB: CPT | Mod: 26,,, | Performed by: PATHOLOGY

## 2020-02-05 PROCEDURE — 63600175 PHARM REV CODE 636 W HCPCS: Performed by: NURSE ANESTHETIST, CERTIFIED REGISTERED

## 2020-02-05 PROCEDURE — 88305 TISSUE EXAM BY PATHOLOGIST: CPT | Mod: 26,,, | Performed by: PATHOLOGY

## 2020-02-05 RX ORDER — PROPOFOL 10 MG/ML
VIAL (ML) INTRAVENOUS
Status: DISCONTINUED | OUTPATIENT
Start: 2020-02-05 | End: 2020-02-05

## 2020-02-05 RX ORDER — LIDOCAINE HYDROCHLORIDE 10 MG/ML
INJECTION, SOLUTION EPIDURAL; INFILTRATION; INTRACAUDAL; PERINEURAL
Status: DISCONTINUED | OUTPATIENT
Start: 2020-02-05 | End: 2020-02-05

## 2020-02-05 RX ORDER — SODIUM CHLORIDE, SODIUM LACTATE, POTASSIUM CHLORIDE, CALCIUM CHLORIDE 600; 310; 30; 20 MG/100ML; MG/100ML; MG/100ML; MG/100ML
INJECTION, SOLUTION INTRAVENOUS CONTINUOUS PRN
Status: DISCONTINUED | OUTPATIENT
Start: 2020-02-05 | End: 2020-02-05

## 2020-02-05 RX ADMIN — PROPOFOL 50 MG: 10 INJECTION, EMULSION INTRAVENOUS at 02:02

## 2020-02-05 RX ADMIN — SODIUM CHLORIDE, SODIUM LACTATE, POTASSIUM CHLORIDE, AND CALCIUM CHLORIDE: 600; 310; 30; 20 INJECTION, SOLUTION INTRAVENOUS at 02:02

## 2020-02-05 RX ADMIN — PROPOFOL 100 MG: 10 INJECTION, EMULSION INTRAVENOUS at 02:02

## 2020-02-05 RX ADMIN — LIDOCAINE HYDROCHLORIDE 50 MG: 10 INJECTION, SOLUTION EPIDURAL; INFILTRATION; INTRACAUDAL; PERINEURAL at 02:02

## 2020-02-05 NOTE — ANESTHESIA RELEASE NOTE
"Anesthesia Release from PACU Note    Patient: Isaías Rush    Procedure(s) Performed: Procedure(s) (LRB):  EGD (ESOPHAGOGASTRODUODENOSCOPY) (N/A)    Anesthesia type: MAC    Post pain: Adequate analgesia    Post assessment: no apparent anesthetic complications    Last Vitals:   Visit Vitals  /73 (BP Location: Left arm, Patient Position: Lying)   Pulse 74   Temp 36.2 °C (97.2 °F)   Resp 19   Ht 5' 2" (1.575 m)   Wt 110.7 kg (244 lb 0.8 oz)   SpO2 100%   Breastfeeding? No   BMI 44.64 kg/m²       Post vital signs: stable    Level of consciousness: awake    Nausea/Vomiting: no nausea/no vomiting    Complications: none    Airway Patency: patent    Respiratory: unassisted    Cardiovascular: stable and blood pressure at baseline    Hydration: euvolemic  "

## 2020-02-05 NOTE — DISCHARGE SUMMARY
Endoscopy Discharge Summary      Admit Date: 2/5/2020    Discharge Date and Time:  2/5/2020 2:32 PM    Attending Physician: Barbara Ruby MD     Discharge Physician: Barbara Ruby MD     Principal Admitting Diagnoses: GERD (gastroesophageal reflux disease)         Discharge Diagnosis: The primary encounter diagnosis was Gastroesophageal reflux disease, esophagitis presence not specified. Diagnoses of Nausea, History of gastric ulcer, and GERD (gastroesophageal reflux disease) were also pertinent to this visit.     Discharged Condition: Good    Indication for Admission: GERD (gastroesophageal reflux disease)     Hospital Course: Patient was admitted for an inpatient procedure and tolerated the procedure well with no complications.    Significant Diagnostic Studies: EGD with biopsy    Pathology (if any):  Specimen (12h ago, onward)    None          Estimated Blood Loss: 1 ml.    Discussed with: patient.    Disposition: Home.    Follow Up/Patient Instructions:   Current Discharge Medication List      CONTINUE these medications which have NOT CHANGED    Details   amitriptyline (ELAVIL) 10 MG tablet Take 1 tablet (10 mg total) by mouth once daily.  Qty: 30 tablet, Refills: 1      cholecalciferol, vitamin D3, 50,000 unit capsule 25,000 Units.       !! montelukast (SINGULAIR) 10 mg tablet Take 10 mg by mouth once daily.      !! montelukast (SINGULAIR) 10 mg tablet Take 1 tablet (10 mg total) by mouth every evening.  Qty: 30 tablet, Refills: 11    Associated Diagnoses: Seasonal allergic rhinitis due to pollen; Mild intermittent asthma, unspecified whether complicated      pantoprazole (PROTONIX) 40 MG tablet TAKE 1 TABLET BY MOUTH AT LEAST 30 MINUTES BEFORE BREAKFAST.  Qty: 30 tablet, Refills: 1    Associated Diagnoses: Gastroesophageal reflux disease, esophagitis presence not specified; Nausea; Hx of gastric ulcer      EPINEPHrine (SYMJEPI) 0.3 mg/0.3 mL Syrg Inject 0.3 mLs (0.3 mg total) as directed as  needed.  Qty: 2 each, Refills: 3    Associated Diagnoses: Fire ant bite, accidental or unintentional, subsequent encounter      meclizine (ANTIVERT) 25 mg tablet Take 25 mg by mouth 3 (three) times daily.       !! - Potential duplicate medications found. Please discuss with provider.          No discharge procedures on file.        Barbara Ruby MD  Gastroenterology and Hepatology

## 2020-02-05 NOTE — ANESTHESIA PREPROCEDURE EVALUATION
02/05/2020  Isaías Rush is a 24 y.o., female.    Anesthesia Evaluation    I have reviewed the Patient Summary Reports.     I have reviewed the Medications.     Review of Systems  Anesthesia Hx:  No problems with previous Anesthesia  History of prior surgery of interest to airway management or planning: Denies Family Hx of Anesthesia complications.   Denies Personal Hx of Anesthesia complications.   Social:  Non-Smoker    Hematology/Oncology:  Hematology Normal   Oncology Normal     Cardiovascular:  Cardiovascular Normal     Pulmonary:   Asthma    Renal/:  Renal/ Normal     Hepatic/GI:   PUD, GERD    Musculoskeletal:  Musculoskeletal Normal    Neurological:  Neurology Normal    Endocrine:  Endocrine Normal    Psych:  Psychiatric Normal           Physical Exam  General:  Morbid Obesity    Airway/Jaw/Neck:  Airway Findings: Mouth Opening: Normal Tongue: Normal  General Airway Assessment: Adult  Mallampati: II  TM Distance: Normal, at least 6 cm          Chest/Lungs:  Chest/Lungs Findings: Normal Respiratory Rate     Heart/Vascular:  Heart Findings: Rate: Normal             Anesthesia Plan  Type of Anesthesia, risks & benefits discussed:  Anesthesia Type:  MAC  Patient's Preference:   Intra-op Monitoring Plan: standard ASA monitors  Intra-op Monitoring Plan Comments:   Post Op Pain Control Plan:   Post Op Pain Control Plan Comments:   Induction:   IV  Beta Blocker:  Patient is not currently on a Beta-Blocker (No further documentation required).       Informed Consent: Patient understands risks and agrees with Anesthesia plan.  Questions answered. Anesthesia consent signed with patient.  ASA Score: 2     Day of Surgery Review of History & Physical:    H&P update referred to the surgeon.         Ready For Surgery From Anesthesia Perspective.

## 2020-02-05 NOTE — TRANSFER OF CARE
"Anesthesia Transfer of Care Note    Patient: Isaías Rush    Procedure(s) Performed: Procedure(s) (LRB):  EGD (ESOPHAGOGASTRODUODENOSCOPY) (N/A)    Patient location: GI    Anesthesia Type: MAC    Transport from OR: Transported from OR on room air with adequate spontaneous ventilation    Post pain: adequate analgesia    Post assessment: no apparent anesthetic complications    Post vital signs: stable    Level of consciousness: awake    Nausea/Vomiting: no nausea/vomiting    Complications: none    Transfer of care protocol was followed      Last vitals:   Visit Vitals  /73 (BP Location: Left arm, Patient Position: Lying)   Pulse 74   Temp 36.2 °C (97.2 °F)   Resp 19   Ht 5' 2" (1.575 m)   Wt 110.7 kg (244 lb 0.8 oz)   SpO2 100%   Breastfeeding? No   BMI 44.64 kg/m²     "

## 2020-02-05 NOTE — DISCHARGE INSTRUCTIONS
Gastritis (Adult)    Gastritis is inflammation and irritation of the stomach lining. It can be present for a short time (acute) or be long lasting (chronic). Gastritis is often caused by infection with bacteria called H pylori. More than a third of people in the [place] have this bacteria in their bodies. In many cases, H pylori causes no problems or symptoms. In some people, though, the infection irritates the stomach lining and causes gastritis. Other causes of stomach irritation include drinking alcohol or taking pain-relieving medicines called NSAIDs (such as aspirin or ibuprofen).   Symptoms of gastritis can include:  · Abdominal pain or bloating  · Loss of appetite  · Nausea or vomiting  · Vomiting blood or having black stools  · Feeling more tired than usual  An inflamed and irritated stomach lining is more likely to develop a sore called an ulcer. To help prevent this, gastritis should be treated.  Home care  If needed, medicines may be prescribed. If you have H pylori infection, treating it will likely relieve your symptoms. Other changes can help reduce stomach irritation and help it heal.  · If you have been prescribed medicines for H pylori infection, take them as directed. Take all of the medicine until it is finished or your healthcare provider tells you to stop, even if you feel better.  · Your healthcare provider may recommend avoiding NSAIDs. If you take daily aspirin for your heart or other medical reasons, do not stop without talking to your healthcare provider first.  · Avoid drinking alcohol.  · Stop smoking. Smoking can irritate the stomach and delay healing. As much as possible, stay away from second hand smoke.  Follow-up care  Follow up with your healthcare provider, or as advised by our staff. Testing may be needed to check for inflammation or an ulcer.  When to seek medical advice  Call your healthcare provider for any of the following:  · Stomach pain that gets worse or moves to the lower  right abdomen (appendix area)  · Chest pain that appears or gets worse, or spreads to the back, neck, shoulder, or arm  · Frequent vomiting (cant keep down liquids)  · Blood in the stool or vomit (red or black in color)  · Feeling weak or dizzy  · Fever of 100.4ºF (38ºC) or higher, or as directed by your healthcare provider  Date Last Reviewed: 6/22/2015  © 8675-2449 [email or web address] [address] [place] [address]. All rights reserved. This information is not intended as a substitute for professional medical care. Always follow your healthcare professional's instructions.

## 2020-02-05 NOTE — H&P
Short Stay Endoscopy History and Physical    PCP - Junito Del Valle MD    Procedure - EGD  ASA - 2  Mallampati - per anesthesia  History of Anesthesia problems - no  Family history Anesthesia problems -  no     HPI:  This is a 24 y.o. female here for evaluation of :   Active Hospital Problems    Diagnosis  POA    *GERD (gastroesophageal reflux disease) [K21.9]  No    Nausea [R11.0]  No    History of gastric ulcer [Z87.19]  Not Applicable      Resolved Hospital Problems   No resolved problems to display.         Reflux - yes  Dysphagia - no  Abdominal pain - yes  Diarrhea - no  Anemia - no  GI bleeding - no    ROS:  CONSTITUTIONAL: Denies weight change,  fatigue, fevers, chills, night sweats.  CARDIOVASCULAR: Denies chest pain, shortness of breath, orthopnea and edema.  RESPIRATORY: Denies cough, hemoptysis, dyspnea, and wheezing.  GI: See HPI.    Medical History:   Past Medical History:   Diagnosis Date    Asthma     Dysfunctional gallbladder     Gastritis     GERD (gastroesophageal reflux disease)     Gluten intolerance        Surgical History:   Past Surgical History:   Procedure Laterality Date    CHOLECYSTECTOMY      GALLBLADDER SURGERY Bilateral 09/2018    WRIST ARTHROPLASTY Bilateral 2009       Family History:  Family History   Problem Relation Age of Onset    JESSICA disease Mother     JESSICA disease Maternal Grandmother     Thyroid disease Maternal Grandmother     Arthritis Maternal Grandmother     Heart disease Paternal Grandfather        Social History:   Social History     Tobacco Use    Smoking status: Never Smoker    Smokeless tobacco: Never Used   Substance Use Topics    Alcohol use: Yes     Comment: rare    Drug use: Never       Allergy  Review of patient's allergies indicates:   Allergen Reactions    Cefprozil Hives    Fire ant      anaphylaxis       Medications:   No current facility-administered medications on file prior to encounter.      Current Outpatient Medications on File Prior  to Encounter   Medication Sig Dispense Refill    amitriptyline (ELAVIL) 10 MG tablet Take 1 tablet (10 mg total) by mouth once daily. 30 tablet 1    cholecalciferol, vitamin D3, 50,000 unit capsule TAKE 1 CAPSULE BY MOUTH WEEKLY FOR 12 WEEKS      meclizine (ANTIVERT) 25 mg tablet Take 25 mg by mouth 3 (three) times daily.      montelukast (SINGULAIR) 10 mg tablet Take 10 mg by mouth once daily.      montelukast (SINGULAIR) 10 mg tablet Take 1 tablet (10 mg total) by mouth every evening. 30 tablet 11    pantoprazole (PROTONIX) 40 MG tablet TAKE 1 TABLET BY MOUTH AT LEAST 30 MINUTES BEFORE BREAKFAST. 30 tablet 1       Physical Exam:  Vital Signs: There were no vitals filed for this visit.  General Appearance: Well appearing in no acute distress  ENT: OP clear  Chest: CTA B  CV: RRR, no m/r/g  Abd: s/nt/nd/nabs  Ext: no edema    Labs:  Reviewed    IMP:  Active Hospital Problems    Diagnosis  POA    *GERD (gastroesophageal reflux disease) [K21.9]  No    Nausea [R11.0]  No    History of gastric ulcer [Z87.19]  Not Applicable      Resolved Hospital Problems   No resolved problems to display.         Plan:  I have explained the risks and benefits of endoscopy procedures to the patient including but not limited to bleeding, perforation, infection, and death. The patient wishes to proceed.

## 2020-02-05 NOTE — ANESTHESIA POSTPROCEDURE EVALUATION
Anesthesia Post Evaluation    Patient: Isaías Rush    Procedure(s) Performed: Procedure(s) (LRB):  EGD (ESOPHAGOGASTRODUODENOSCOPY) (N/A)    Final Anesthesia Type: MAC    Patient location during evaluation: GI PACU  Patient participation: Yes- Able to Participate  Level of consciousness: awake and alert  Post-procedure vital signs: reviewed and stable  Pain management: adequate  Airway patency: patent    PONV status at discharge: No PONV  Anesthetic complications: no      Cardiovascular status: blood pressure returned to baseline  Respiratory status: unassisted  Hydration status: euvolemic  Follow-up not needed.          Vitals Value Taken Time   /73 2/5/2020  2:06 PM   Temp 36.2 °C (97.2 °F) 2/5/2020  2:06 PM   Pulse 74 2/5/2020  2:06 PM   Resp 19 2/5/2020  2:06 PM   SpO2 100 % 2/5/2020  2:06 PM         No case tracking events are documented in the log.      Pain/Sylvia Score: No data recorded

## 2020-02-21 ENCOUNTER — PATIENT OUTREACH (OUTPATIENT)
Dept: ADMINISTRATIVE | Facility: HOSPITAL | Age: 25
End: 2020-02-21

## 2020-02-24 ENCOUNTER — OFFICE VISIT (OUTPATIENT)
Dept: INTERNAL MEDICINE | Facility: CLINIC | Age: 25
End: 2020-02-24
Payer: COMMERCIAL

## 2020-02-24 VITALS
DIASTOLIC BLOOD PRESSURE: 78 MMHG | SYSTOLIC BLOOD PRESSURE: 128 MMHG | TEMPERATURE: 96 F | OXYGEN SATURATION: 95 % | BODY MASS INDEX: 45.44 KG/M2 | WEIGHT: 248.44 LBS | HEART RATE: 80 BPM

## 2020-02-24 DIAGNOSIS — E66.9 OBESITY, UNSPECIFIED CLASSIFICATION, UNSPECIFIED OBESITY TYPE, UNSPECIFIED WHETHER SERIOUS COMORBIDITY PRESENT: ICD-10-CM

## 2020-02-24 DIAGNOSIS — J45.909 ASTHMA, UNSPECIFIED ASTHMA SEVERITY, UNSPECIFIED WHETHER COMPLICATED, UNSPECIFIED WHETHER PERSISTENT: ICD-10-CM

## 2020-02-24 DIAGNOSIS — K21.9 GASTROESOPHAGEAL REFLUX DISEASE, ESOPHAGITIS PRESENCE NOT SPECIFIED: ICD-10-CM

## 2020-02-24 DIAGNOSIS — Z76.89 ENCOUNTER TO ESTABLISH CARE: Primary | ICD-10-CM

## 2020-02-24 DIAGNOSIS — Z11.4 SCREENING FOR HIV (HUMAN IMMUNODEFICIENCY VIRUS): ICD-10-CM

## 2020-02-24 PROCEDURE — 99204 OFFICE O/P NEW MOD 45 MIN: CPT | Mod: S$GLB,,, | Performed by: FAMILY MEDICINE

## 2020-02-24 PROCEDURE — 99204 PR OFFICE/OUTPT VISIT, NEW, LEVL IV, 45-59 MIN: ICD-10-PCS | Mod: S$GLB,,, | Performed by: FAMILY MEDICINE

## 2020-02-24 PROCEDURE — 99999 PR PBB SHADOW E&M-EST. PATIENT-LVL III: CPT | Mod: PBBFAC,,, | Performed by: FAMILY MEDICINE

## 2020-02-24 PROCEDURE — 3008F PR BODY MASS INDEX (BMI) DOCUMENTED: ICD-10-PCS | Mod: CPTII,S$GLB,, | Performed by: FAMILY MEDICINE

## 2020-02-24 PROCEDURE — 99999 PR PBB SHADOW E&M-EST. PATIENT-LVL III: ICD-10-PCS | Mod: PBBFAC,,, | Performed by: FAMILY MEDICINE

## 2020-02-24 PROCEDURE — 3008F BODY MASS INDEX DOCD: CPT | Mod: CPTII,S$GLB,, | Performed by: FAMILY MEDICINE

## 2020-02-24 RX ORDER — AMITRIPTYLINE HYDROCHLORIDE 10 MG/1
10 TABLET, FILM COATED ORAL DAILY
Qty: 90 TABLET | Refills: 1 | Status: SHIPPED | OUTPATIENT
Start: 2020-02-24 | End: 2020-07-31

## 2020-02-24 NOTE — PROGRESS NOTES
Subjective:       Patient ID: Isaías Rush is a 24 y.o. female.    Chief Complaint: No chief complaint on file.    HPI       24    Past Medical History:   Diagnosis Date    Asthma     Dysfunctional gallbladder     Gastritis     GERD (gastroesophageal reflux disease)     Gluten intolerance     Lactose intolerance      Past Surgical History:   Procedure Laterality Date    CHOLECYSTECTOMY      COLONOSCOPY  12/2018    ESOPHAGOGASTRODUODENOSCOPY N/A 2/5/2020    Procedure: EGD (ESOPHAGOGASTRODUODENOSCOPY);  Surgeon: Barbara Ruby MD;  [facility];  Service: Endoscopy;  Laterality: N/A;    GALLBLADDER SURGERY Bilateral 09/2018    WRIST ARTHROPLASTY Bilateral 2009     Family History   Problem Relation Age of Onset    JESSICA disease Mother     JESSICA disease Maternal Grandmother     Thyroid disease Maternal Grandmother     Arthritis Maternal Grandmother     Heart disease Paternal Grandfather     Breast cancer Other      Social History     Tobacco Use   Smoking Status Never Smoker   Smokeless Tobacco Never Used     Reviewed recent allergist note  Reviewed recent GI note  Scope planned    Reviewed available lab -     Wt Readings from Last 4 Encounters:   02/24/20 112.7 kg (248 lb 7.3 oz)   02/05/20 110.7 kg (244 lb 0.8 oz)   01/20/20 112.2 kg (247 lb 5.7 oz)   01/10/20 108.9 kg (240 lb)     Had longstanding RUQ since gallbladder out  Elavil helped  D/c recently  Pain worsened    Trying to lose weight    GERD significant  Stopped PPI  It was helping    Takes singulair for asthma  Controls asthma on its own  Has albuterol inhaler but has not needed for many months      Review of Systems   Constitutional: Negative for activity change.   Eyes: Negative for discharge.   Respiratory: Negative for wheezing.    Cardiovascular: Negative for chest pain and palpitations.   Gastrointestinal: Negative for constipation, diarrhea and vomiting.   Genitourinary: Negative for difficulty urinating and hematuria.    Neurological: Negative for headaches.   Psychiatric/Behavioral: Negative for dysphoric mood.       Objective:       Vitals:    02/24/20 1616   BP: 128/78   Pulse: 80   Temp: 96.4 °F (35.8 °C)       Physical Exam   Constitutional: She is oriented to person, place, and time. She appears well-developed. She does not have a sickly appearance. No distress.   HENT:   Head: Normocephalic and atraumatic.   Eyes: Conjunctivae and lids are normal.   Neck: Normal range of motion and full passive range of motion without pain.   Cardiovascular: Normal rate, regular rhythm and normal heart sounds.   Pulmonary/Chest: Effort normal and breath sounds normal. No respiratory distress.   Abdominal: Soft. Normal appearance. She exhibits no distension.   Musculoskeletal: Normal range of motion.   Lymphadenopathy:     She has no cervical adenopathy.   Neurological: She is alert and oriented to person, place, and time. She is not disoriented.   Skin: Skin is intact. No pallor.   Psychiatric: She has a normal mood and affect. Her speech is normal and behavior is normal. Thought content normal.       Assessment:       1. Encounter to establish care    2. Asthma, unspecified asthma severity, unspecified whether complicated, unspecified whether persistent    3. Obesity, unspecified classification, unspecified obesity type, unspecified whether serious comorbidity present    4. Gastroesophageal reflux disease, esophagitis presence not specified    5. Screening for HIV (human immunodeficiency virus)        Plan:     GERD  Has PPI  Results pending -     Asthma  Singulair  Has albuterol inhaler - just in case - has not need for 2 years.  Has one year left on current inhaler    Red ant allergy  Has Epi-pen    RUQ pain  Was doing better on elavil  We will return to its use  Uncertain of etiology - but maybe post surgical nerve pains    Obesity   Discussed weight loss    Establish care  labs

## 2020-02-29 ENCOUNTER — LAB VISIT (OUTPATIENT)
Dept: LAB | Facility: HOSPITAL | Age: 25
End: 2020-02-29
Attending: FAMILY MEDICINE
Payer: COMMERCIAL

## 2020-02-29 DIAGNOSIS — Z76.89 ENCOUNTER TO ESTABLISH CARE: ICD-10-CM

## 2020-02-29 DIAGNOSIS — Z11.4 SCREENING FOR HIV (HUMAN IMMUNODEFICIENCY VIRUS): ICD-10-CM

## 2020-02-29 LAB
ALBUMIN SERPL BCP-MCNC: 3.7 G/DL (ref 3.5–5.2)
ALP SERPL-CCNC: 48 U/L (ref 55–135)
ALT SERPL W/O P-5'-P-CCNC: 40 U/L (ref 10–44)
ANION GAP SERPL CALC-SCNC: 8 MMOL/L (ref 8–16)
AST SERPL-CCNC: 34 U/L (ref 10–40)
BASOPHILS # BLD AUTO: 0.05 K/UL (ref 0–0.2)
BASOPHILS NFR BLD: 1.1 % (ref 0–1.9)
BILIRUB SERPL-MCNC: 0.5 MG/DL (ref 0.1–1)
BUN SERPL-MCNC: 7 MG/DL (ref 6–20)
CALCIUM SERPL-MCNC: 9 MG/DL (ref 8.7–10.5)
CHLORIDE SERPL-SCNC: 107 MMOL/L (ref 95–110)
CHOLEST SERPL-MCNC: 177 MG/DL (ref 120–199)
CHOLEST/HDLC SERPL: 4.1 {RATIO} (ref 2–5)
CO2 SERPL-SCNC: 26 MMOL/L (ref 23–29)
CREAT SERPL-MCNC: 0.7 MG/DL (ref 0.5–1.4)
DIFFERENTIAL METHOD: ABNORMAL
EOSINOPHIL # BLD AUTO: 0.1 K/UL (ref 0–0.5)
EOSINOPHIL NFR BLD: 1.8 % (ref 0–8)
ERYTHROCYTE [DISTWIDTH] IN BLOOD BY AUTOMATED COUNT: 11.9 % (ref 11.5–14.5)
EST. GFR  (AFRICAN AMERICAN): >60 ML/MIN/1.73 M^2
EST. GFR  (NON AFRICAN AMERICAN): >60 ML/MIN/1.73 M^2
GLUCOSE SERPL-MCNC: 92 MG/DL (ref 70–110)
HCT VFR BLD AUTO: 39.8 % (ref 37–48.5)
HDLC SERPL-MCNC: 43 MG/DL (ref 40–75)
HDLC SERPL: 24.3 % (ref 20–50)
HGB BLD-MCNC: 12.9 G/DL (ref 12–16)
IMM GRANULOCYTES # BLD AUTO: 0.01 K/UL (ref 0–0.04)
IMM GRANULOCYTES NFR BLD AUTO: 0.2 % (ref 0–0.5)
LDLC SERPL CALC-MCNC: 116.4 MG/DL (ref 63–159)
LYMPHOCYTES # BLD AUTO: 1.4 K/UL (ref 1–4.8)
LYMPHOCYTES NFR BLD: 30.9 % (ref 18–48)
MCH RBC QN AUTO: 31.2 PG (ref 27–31)
MCHC RBC AUTO-ENTMCNC: 32.4 G/DL (ref 32–36)
MCV RBC AUTO: 96 FL (ref 82–98)
MONOCYTES # BLD AUTO: 0.4 K/UL (ref 0.3–1)
MONOCYTES NFR BLD: 8.3 % (ref 4–15)
NEUTROPHILS # BLD AUTO: 2.6 K/UL (ref 1.8–7.7)
NEUTROPHILS NFR BLD: 57.7 % (ref 38–73)
NONHDLC SERPL-MCNC: 134 MG/DL
NRBC BLD-RTO: 0 /100 WBC
PLATELET # BLD AUTO: 339 K/UL (ref 150–350)
PMV BLD AUTO: 11.1 FL (ref 9.2–12.9)
POTASSIUM SERPL-SCNC: 4.2 MMOL/L (ref 3.5–5.1)
PROT SERPL-MCNC: 7 G/DL (ref 6–8.4)
RBC # BLD AUTO: 4.14 M/UL (ref 4–5.4)
SODIUM SERPL-SCNC: 141 MMOL/L (ref 136–145)
TRIGL SERPL-MCNC: 88 MG/DL (ref 30–150)
TSH SERPL DL<=0.005 MIU/L-ACNC: 0.72 UIU/ML (ref 0.4–4)
WBC # BLD AUTO: 4.44 K/UL (ref 3.9–12.7)

## 2020-02-29 PROCEDURE — 86703 HIV-1/HIV-2 1 RESULT ANTBDY: CPT

## 2020-02-29 PROCEDURE — 36415 COLL VENOUS BLD VENIPUNCTURE: CPT

## 2020-02-29 PROCEDURE — 80061 LIPID PANEL: CPT

## 2020-02-29 PROCEDURE — 85025 COMPLETE CBC W/AUTO DIFF WBC: CPT

## 2020-02-29 PROCEDURE — 84443 ASSAY THYROID STIM HORMONE: CPT

## 2020-02-29 PROCEDURE — 80053 COMPREHEN METABOLIC PANEL: CPT

## 2020-03-02 ENCOUNTER — TELEPHONE (OUTPATIENT)
Dept: GASTROENTEROLOGY | Facility: CLINIC | Age: 25
End: 2020-03-02

## 2020-03-02 NOTE — TELEPHONE ENCOUNTER
----- Message from Vincent Reyes sent at 3/2/2020  2:35 PM CST -----  Contact: pt   PT is calling for test  results      Pt can be reached at  [phone]

## 2020-03-03 ENCOUNTER — TELEPHONE (OUTPATIENT)
Dept: INTERNAL MEDICINE | Facility: CLINIC | Age: 25
End: 2020-03-03

## 2020-03-03 LAB — HIV 1+2 AB+HIV1 P24 AG SERPL QL IA: NEGATIVE

## 2020-03-03 NOTE — TELEPHONE ENCOUNTER
----- Message from Junito Del Valle MD sent at 3/3/2020 11:10 AM CST -----  Please call the patient regarding her labs  Stable  No acute findings

## 2020-03-03 NOTE — TELEPHONE ENCOUNTER
----- Message from Cherrie Molina sent at 3/3/2020  3:59 PM CST -----  Contact: self  Type:  Patient Returning Call    Who Called:pt  Who Left Message for Patient:austin  Does the patient know what this is regarding?:no  Would the patient rather a call back or a response via [facility]? Call back  Best Call Back Number:[phone]  Additional Information: none    Thanks,  Cherrie Molina

## 2020-03-04 ENCOUNTER — OFFICE VISIT (OUTPATIENT)
Dept: INTERNAL MEDICINE | Facility: CLINIC | Age: 25
End: 2020-03-04
Payer: COMMERCIAL

## 2020-03-04 VITALS
OXYGEN SATURATION: 96 % | WEIGHT: 242.06 LBS | DIASTOLIC BLOOD PRESSURE: 84 MMHG | HEIGHT: 62 IN | SYSTOLIC BLOOD PRESSURE: 128 MMHG | BODY MASS INDEX: 44.55 KG/M2 | TEMPERATURE: 98 F | HEART RATE: 88 BPM

## 2020-03-04 DIAGNOSIS — J02.9 PHARYNGITIS, UNSPECIFIED ETIOLOGY: Primary | ICD-10-CM

## 2020-03-04 LAB
GROUP A STREP, MOLECULAR: NEGATIVE
INFLUENZA A, MOLECULAR: NEGATIVE
INFLUENZA B, MOLECULAR: NEGATIVE
SPECIMEN SOURCE: NORMAL

## 2020-03-04 PROCEDURE — 99213 PR OFFICE/OUTPT VISIT, EST, LEVL III, 20-29 MIN: ICD-10-PCS | Mod: S$GLB,,, | Performed by: PEDIATRICS

## 2020-03-04 PROCEDURE — 3008F BODY MASS INDEX DOCD: CPT | Mod: CPTII,S$GLB,, | Performed by: PEDIATRICS

## 2020-03-04 PROCEDURE — 99999 PR PBB SHADOW E&M-EST. PATIENT-LVL III: ICD-10-PCS | Mod: PBBFAC,,, | Performed by: PEDIATRICS

## 2020-03-04 PROCEDURE — 87651 STREP A DNA AMP PROBE: CPT

## 2020-03-04 PROCEDURE — 99999 PR PBB SHADOW E&M-EST. PATIENT-LVL III: CPT | Mod: PBBFAC,,, | Performed by: PEDIATRICS

## 2020-03-04 PROCEDURE — 87502 INFLUENZA DNA AMP PROBE: CPT

## 2020-03-04 PROCEDURE — 3008F PR BODY MASS INDEX (BMI) DOCUMENTED: ICD-10-PCS | Mod: CPTII,S$GLB,, | Performed by: PEDIATRICS

## 2020-03-04 PROCEDURE — 99213 OFFICE O/P EST LOW 20 MIN: CPT | Mod: S$GLB,,, | Performed by: PEDIATRICS

## 2020-03-04 NOTE — PROGRESS NOTES
Subjective:       Patient ID: Isaías Rush is a 24 y.o. female.    Chief Complaint: URI    Yesterday mild NP cough, severe ST, URI. No HA, ear pain. Mild upper chest pain. Had flu vaccine. No wheezing, SOB, asthma quiet.    Review of Systems   Constitutional: Negative for chills, fever and unexpected weight change.   HENT: Positive for congestion, rhinorrhea and sore throat. Negative for sinus pressure, sinus pain and trouble swallowing.    Eyes: Negative for discharge and redness.   Respiratory: Positive for cough. Negative for chest tightness, shortness of breath and wheezing.    Cardiovascular: Positive for chest pain. Negative for palpitations and leg swelling.   Gastrointestinal: Negative for constipation, diarrhea and vomiting.   Genitourinary: Negative for decreased urine volume, difficulty urinating and menstrual problem.   Musculoskeletal: Negative for arthralgias and joint swelling.   Skin: Negative for rash and wound.   Neurological: Negative for syncope and headaches.   Psychiatric/Behavioral: Negative for behavioral problems and sleep disturbance.       Objective:      Physical Exam   Constitutional: She is oriented to person, place, and time. She appears well-developed and well-nourished. No distress.   Neck: No JVD present. No thyromegaly present.   Cardiovascular: Normal rate, regular rhythm and normal heart sounds.   No murmur heard.  Pulmonary/Chest: Effort normal and breath sounds normal. No respiratory distress. She has no wheezes. She has no rales.   Musculoskeletal: She exhibits no edema.   Lymphadenopathy:     She has no cervical adenopathy.   Neurological: She is alert and oriented to person, place, and time. No cranial nerve deficit. Coordination normal.   Skin: Capillary refill takes less than 2 seconds. No rash noted.   Psychiatric: She has a normal mood and affect. Her behavior is normal. Judgment and thought content normal.       Assessment:       1. Pharyngitis, unspecified  etiology        Plan:       Pharyngitis, unspecified etiology  -     Throat Screen, Rapid  -     Influenza A & B by Molecular; Future; Expected date: 03/04/2020    treat with tamiflu if flu positive, amoxil if strep positive. Symptomatic treatment and infection control d/w patient. No CoVit 19 risk exposures.

## 2020-03-04 NOTE — LETTER
March 4, 2020      [facility]  [address]  [place] [address]  Phone: [phone]  Fax: [phone]       Patient: Isaías Rush   YOB: 1995  Date of Visit: 03/04/2020    To Whom It May Concern:    Jeni Rush  was at [facility] on 03/04/2020. Please excuse her for the following date 3/4/20-3/6/20. She  may return to work/school on 3/9/20 with no restrictions. If you have any questions or concerns, or if I can be of further assistance, please do not hesitate to contact me.    Sincerely,          Alberta Garcia MA

## 2020-03-08 ENCOUNTER — PATIENT MESSAGE (OUTPATIENT)
Dept: INTERNAL MEDICINE | Facility: CLINIC | Age: 25
End: 2020-03-08

## 2020-03-09 ENCOUNTER — PATIENT MESSAGE (OUTPATIENT)
Dept: INTERNAL MEDICINE | Facility: CLINIC | Age: 25
End: 2020-03-09

## 2020-03-10 ENCOUNTER — PATIENT OUTREACH (OUTPATIENT)
Dept: ADMINISTRATIVE | Facility: OTHER | Age: 25
End: 2020-03-10

## 2020-03-12 ENCOUNTER — OFFICE VISIT (OUTPATIENT)
Dept: URGENT CARE | Facility: CLINIC | Age: 25
End: 2020-03-12
Payer: COMMERCIAL

## 2020-03-12 ENCOUNTER — TELEPHONE (OUTPATIENT)
Dept: INTERNAL MEDICINE | Facility: CLINIC | Age: 25
End: 2020-03-12

## 2020-03-12 VITALS
BODY MASS INDEX: 44.71 KG/M2 | OXYGEN SATURATION: 100 % | SYSTOLIC BLOOD PRESSURE: 139 MMHG | WEIGHT: 242.94 LBS | TEMPERATURE: 98 F | RESPIRATION RATE: 16 BRPM | DIASTOLIC BLOOD PRESSURE: 72 MMHG | HEIGHT: 62 IN | HEART RATE: 68 BPM

## 2020-03-12 DIAGNOSIS — J22 BACTERIAL LOWER RESPIRATORY INFECTION: ICD-10-CM

## 2020-03-12 DIAGNOSIS — R05.9 COUGH: ICD-10-CM

## 2020-03-12 DIAGNOSIS — B96.89 BACTERIAL LOWER RESPIRATORY INFECTION: ICD-10-CM

## 2020-03-12 DIAGNOSIS — R11.0 NAUSEA: Primary | ICD-10-CM

## 2020-03-12 LAB
FINAL PATHOLOGIC DIAGNOSIS: NORMAL
GROSS: NORMAL

## 2020-03-12 PROCEDURE — 99214 PR OFFICE/OUTPT VISIT, EST, LEVL IV, 30-39 MIN: ICD-10-PCS | Mod: S$GLB,,, | Performed by: PHYSICIAN ASSISTANT

## 2020-03-12 PROCEDURE — 99214 OFFICE O/P EST MOD 30 MIN: CPT | Mod: S$GLB,,, | Performed by: PHYSICIAN ASSISTANT

## 2020-03-12 RX ORDER — PROMETHAZINE HYDROCHLORIDE AND DEXTROMETHORPHAN HYDROBROMIDE 6.25; 15 MG/5ML; MG/5ML
5 SYRUP ORAL NIGHTLY PRN
Qty: 118 ML | Refills: 0 | Status: SHIPPED | OUTPATIENT
Start: 2020-03-12 | End: 2020-04-04

## 2020-03-12 RX ORDER — AZITHROMYCIN 250 MG/1
250 TABLET, FILM COATED ORAL DAILY
Qty: 6 TABLET | Refills: 0 | Status: SHIPPED | OUTPATIENT
Start: 2020-03-12 | End: 2020-03-17

## 2020-03-12 RX ORDER — ONDANSETRON 8 MG/1
8 TABLET, ORALLY DISINTEGRATING ORAL
Status: COMPLETED | OUTPATIENT
Start: 2020-03-12 | End: 2020-03-12

## 2020-03-12 RX ORDER — ONDANSETRON HYDROCHLORIDE 8 MG/1
8 TABLET, FILM COATED ORAL EVERY 8 HOURS PRN
Qty: 12 TABLET | Refills: 0 | Status: SHIPPED | OUTPATIENT
Start: 2020-03-12 | End: 2020-03-17

## 2020-03-12 RX ADMIN — ONDANSETRON 8 MG: 8 TABLET, ORALLY DISINTEGRATING ORAL at 10:03

## 2020-03-12 NOTE — LETTER
March 12, 2020      [facility]  [address]  [facility] [address]  Phone: [phone]       Patient: Isaías Rush   YOB: 1995  Date of Visit: 03/12/2020    To Whom It May Concern:    Jeni Rush  was at [facility] on 03/12/2020. She may return to work on 3/16/2020 with no restrictions. If you have any questions or concerns, or if I can be of further assistance, please do not hesitate to contact me.    Sincerely,          Aleena Gao PA-C

## 2020-03-12 NOTE — PATIENT INSTRUCTIONS
· Rest and increase fluids.   · Humidifier/prop up at night   · mucinex to thin mucous   · zofran for nausea   · Take antibiotics exactly as prescribed. Make sure to complete the entire course of antibiotics even if you start feeling better. This will prevent recurrence of your infection and bacterial resistance.   · Do not drive, drink alcohol, or take any other sedating medications or substances while taking cough syrup.   · Return or follow-up with your primary care provider if not improved within a few days or sooner for any new or worsening symptoms.   · Go to the ER for any fever that does not improve with Tylenol/Ibuprofen, neck stiffness, rash, severe headache, vision changes, shortness of breath, chest pain, severe facial pain or swelling, or for any other new and concerning symptoms.

## 2020-03-12 NOTE — TELEPHONE ENCOUNTER
----- Message from Modesta Oakes sent at 3/12/2020  9:46 AM CDT -----  Contact: pt  .Type:  Same Day Appointment Request    Caller is requesting a same day appointment.  Caller declined first available appointment listed below.    Name of Caller: pt  When is the first available appointment? 3/13  Symptoms: sore throat/coughing/ weak nauseas   Best Call Back Number:[phone] (home)     Additional Information:  Pt is currently at location and will like an appt asap

## 2020-03-12 NOTE — PROGRESS NOTES
"Subjective:       Patient ID: Isaías Rush is a 24 y.o. female.    Vitals:  height is 5' 2" (1.575 m) and weight is 110.2 kg (242 lb 15.2 oz). Her tympanic temperature is 98.2 °F (36.8 °C). Her blood pressure is 139/72 and her pulse is 68. Her respiration is 16 and oxygen saturation is 100%.     Chief Complaint: LEONARDA Metz is a 24 year old female who presents to urgent care with URI symptoms for the past 8 days. She was initially seen a week ago in primary care for sore throat, and her strep/flu tests were negative at that time.  A few days afterwards, she went to an outside urgent care.  She was treated with steroids and cough medicine at that time.  She has not getting any better and her cough is bothering her the most.  She denies fever.  She admits some nausea.      URI    The current episode started 1 to 4 weeks ago. The problem has been unchanged. There has been no fever. Associated symptoms include coughing, nausea and a sore throat. Pertinent negatives include no congestion, ear pain, rash, sinus pain, vomiting or wheezing. Treatments tried: flonase, sinex, robitussin, cough drops mucinex. The treatment provided mild relief.       Constitution: Positive for chills and fatigue. Negative for sweating and fever.   HENT: Positive for sore throat. Negative for ear pain, congestion, sinus pain, sinus pressure and voice change.    Neck: Negative for painful lymph nodes.   Eyes: Negative for eye redness.   Respiratory: Positive for cough. Negative for chest tightness, sputum production, bloody sputum, COPD, shortness of breath, stridor, wheezing and asthma.    Gastrointestinal: Positive for nausea. Negative for vomiting.   Musculoskeletal: Negative for muscle ache.   Skin: Negative for rash.   Allergic/Immunologic: Negative for seasonal allergies and asthma.   Hematologic/Lymphatic: Negative for swollen lymph nodes.       Objective:      Physical Exam   Constitutional: She is oriented to person, place, and " time. She appears well-developed and well-nourished. She is cooperative.  Non-toxic appearance. She does not have a sickly appearance. She does not appear ill. No distress.   HENT:   Head: Normocephalic and atraumatic.   Right Ear: Hearing, tympanic membrane, external ear and ear canal normal.   Left Ear: Hearing, external ear and ear canal normal. A middle ear effusion is present.   Nose: Nose normal. No mucosal edema, rhinorrhea or nasal deformity. No epistaxis. Right sinus exhibits no maxillary sinus tenderness and no frontal sinus tenderness. Left sinus exhibits no maxillary sinus tenderness and no frontal sinus tenderness.   Mouth/Throat: Uvula is midline, oropharynx is clear and moist and mucous membranes are normal. No trismus in the jaw. Normal dentition. No uvula swelling. No oropharyngeal exudate, posterior oropharyngeal edema or posterior oropharyngeal erythema (hyperemia ).   Eyes: Conjunctivae and lids are normal. No scleral icterus.   Neck: Trachea normal, full passive range of motion without pain and phonation normal. Neck supple. No neck rigidity. No edema and no erythema present.   Cardiovascular: Normal rate, regular rhythm, normal heart sounds, intact distal pulses and normal pulses.   Pulmonary/Chest: Effort normal and breath sounds normal. No respiratory distress. She has no decreased breath sounds. She has no rhonchi.   Abdominal: Normal appearance.   Musculoskeletal: Normal range of motion. She exhibits no edema or deformity.   Neurological: She is alert and oriented to person, place, and time. She exhibits normal muscle tone. Coordination normal.   Skin: Skin is warm, dry, intact, not diaphoretic and not pale.   Psychiatric: She has a normal mood and affect. Her speech is normal and behavior is normal. Judgment and thought content normal. Cognition and memory are normal.   Nursing note and vitals reviewed.        Assessment:       1. Nausea    2. Bacterial lower respiratory infection    3.  Cough        Plan:         Nausea  -     ondansetron (ZOFRAN) 8 MG tablet; Take 1 tablet (8 mg total) by mouth every 8 (eight) hours as needed for Nausea.  Dispense: 12 tablet; Refill: 0  -     ondansetron disintegrating tablet 8 mg    Bacterial lower respiratory infection  -     azithromycin (Z-AMI) 250 MG tablet; Take two tablets on day one and take one tablet daily for the next four days.  Dispense: 6 tablet; Refill: 0    Cough  -     promethazine-dextromethorphan (PROMETHAZINE-DM) 6.25-15 mg/5 mL Syrp; Take 5 mLs by mouth nightly as needed.  Dispense: 118 mL; Refill: 0         Lower Respiratory Infection    -  Discussed viral vs. Bacterial etiology - Given symptoms greater than a week with discolored sputum, start Azithromycin    -  Mucinex, fluids, rest    -  Promethazine dm for night time cough only - do not drive, drink alcohol, or take any other sedating medications or substances while taking cough syrup.   eturn or follow-up with your primary care provider if not improved within a few days or sooner for any new or worsening symptoms.   -  Go to the ER for any fever that does not improve with Tylenol/Ibuprofen, neck stiffness, rash, severe headache, vision changes, shortness of breath, chest pain, severe facial pain or swelling, or for any other new and concerning symptoms.     Aleena Gao PA-C   Physician Assistant   [facility]

## 2020-03-15 ENCOUNTER — TELEPHONE (OUTPATIENT)
Dept: URGENT CARE | Facility: CLINIC | Age: 25
End: 2020-03-15

## 2020-03-30 ENCOUNTER — TELEPHONE (OUTPATIENT)
Dept: ALLERGY | Facility: CLINIC | Age: 25
End: 2020-03-30

## 2020-03-30 NOTE — TELEPHONE ENCOUNTER
----- Message from Kevin Do PharmD sent at 3/30/2020  1:47 PM CDT -----  Regarding: Hizentra Script  Good afternoon,     The patient's was previously prescribed Hizentra by Dr. Ale Moore (25 grams every 2 weeks). The prior authorization has been approved. The patient will need to fill this with [facility] to obtain an at-home infusion pump. The patient stated that she is now seeing Dr. Velasquez to manage this. If appropriate, could you send a script to [facility].     Thanks in advance,     Kevin Do, PharmD  Clinical Pharmacist  [facility]  P: [phone]

## 2020-04-02 ENCOUNTER — PATIENT MESSAGE (OUTPATIENT)
Dept: OBSTETRICS AND GYNECOLOGY | Facility: CLINIC | Age: 25
End: 2020-04-02

## 2020-04-06 ENCOUNTER — TELEPHONE (OUTPATIENT)
Dept: PHARMACY | Facility: CLINIC | Age: 25
End: 2020-04-06

## 2020-04-06 NOTE — TELEPHONE ENCOUNTER
Call to make sure patient had been reached by [facility]. She had not, provided number for [facility] for patient to call them. Provider has sent prescription. Instructed patient to call [facility] when she has made contact with [facility]. She agreed to a call back next week.     Zac Montejo, PharmD  Clinical Pharmacist  [facility]  P: [phone]

## 2020-04-09 ENCOUNTER — TELEPHONE (OUTPATIENT)
Dept: INTERNAL MEDICINE | Facility: CLINIC | Age: 25
End: 2020-04-09

## 2020-04-09 NOTE — TELEPHONE ENCOUNTER
Called and spoke with pt she says received the bite 2days ago, bite has gotten redder and itches more. Has been treated with Cortizone cream. Pt will call back to make appt with NP.

## 2020-04-09 NOTE — TELEPHONE ENCOUNTER
----- Message from Derrick Short sent at 4/9/2020  2:57 PM CDT -----  Contact: pt  Pt  Isaías Rush    Bug bite that's getting big and red    Pt can be reached at [phone]    Pt would like to be seen as soon possible and would like to be seen in the office

## 2020-04-11 ENCOUNTER — OFFICE VISIT (OUTPATIENT)
Dept: URGENT CARE | Facility: CLINIC | Age: 25
End: 2020-04-11
Payer: COMMERCIAL

## 2020-04-11 VITALS
DIASTOLIC BLOOD PRESSURE: 72 MMHG | RESPIRATION RATE: 16 BRPM | OXYGEN SATURATION: 97 % | TEMPERATURE: 98 F | SYSTOLIC BLOOD PRESSURE: 128 MMHG | HEART RATE: 78 BPM

## 2020-04-11 DIAGNOSIS — B96.89 BACTERIAL SKIN INFECTION: Primary | ICD-10-CM
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
## 2020-04-11 DIAGNOSIS — L08.9 BACTERIAL SKIN INFECTION: Primary | ICD-10-CM

## 2020-04-11 DIAGNOSIS — S80.862A INSECT BITE OF LEFT LOWER LEG, INITIAL ENCOUNTER: ICD-10-CM

## 2020-04-11 PROCEDURE — 99214 PR OFFICE/OUTPT VISIT, EST, LEVL IV, 30-39 MIN: ICD-10-PCS | Mod: S$GLB,,, | Performed by: NURSE PRACTITIONER

## 2020-04-11 PROCEDURE — 99214 OFFICE O/P EST MOD 30 MIN: CPT | Mod: S$GLB,,, | Performed by: NURSE PRACTITIONER

## 2020-04-11 RX ORDER — SULFAMETHOXAZOLE AND TRIMETHOPRIM 800; 160 MG/1; MG/1
1 TABLET ORAL 2 TIMES DAILY
Qty: 20 TABLET | Refills: 0 | Status: SHIPPED | OUTPATIENT
Start: 2020-04-11 | End: 2020-04-21

## 2020-04-11 NOTE — PATIENT INSTRUCTIONS
Insect Bite  Insects most often bite to protect themselves or their nests. Certain bugs, like fleas and mosquitoes, bite to feed. In some cases, the actual bite causes no pain. An itchy red welt or swelling may develop at the site of the bite. Most insect bites do not cause illness. And the itching and swelling most often go away without treatment. However, an infection can develop if the bite is scratched and the skin broken. Rarely, a person may have an allergic reaction to an insect bite.  If a stinger is visible at the bite spot, remove it as quickly as possible, as this can decrease the amount of venom that gets into your body. Scrape it out with a dull edge, such as the edge of a credit card. Try not to squeeze it. Do not try to dig it out, as you may damage the skin and also increase the chance of infection.     To help reduce swelling and itching, apply a cold pack or ice in a zip-top plastic bag wrapped in a thin towel.   Home care  · Your healthcare provider may prescribe over-the-counter medicines to help relieve itching and swelling. Use each medicine according to the directions on the package. If the bite becomes infected, you will need an antibiotic. This may be in pill form taken by mouth or as an ointment or cream put directly on the skin. Be sure to use them exactly as prescribed.  · Bite symptoms usually go away on their own within a week or two.  · To help prevent infection, avoid scratching or picking at the bite.  · To help relieve itching and swelling, apply ice in a zip-top plastic bag wrapped in a thin towel to the bites. Do this for up to 10 minutes at a time. Avoid hot showers or baths as these tend to make itching worse.  · An over-the-counter anti-itch medicine such as calamine lotion or an antihistamine cream may be helpful.  · If you suspect you have insects in your home, talk to a licensed pest-control professional. He or she can inspect your home and tell you how to get rid of bugs  safely.  Follow-up care  Follow up with your healthcare provider, or as advised.  Call 911  Call 911 if any of these occur:  · Trouble breathing or swallowing  · Wheezing  · Feeling like your throat is closing up  · Fainting, loss of consciousness  · Swelling around the face or mouth  When to seek medical advice  Call your healthcare provider right away if any of these occur:  · Fever of 100.4°F (38°C) or higher, or as directed by your healthcare provider  · Signs of infection, such as increased swelling and pain, warmth, red streaks, or drainage from the skin  · Signs of allergic reaction, such as hives, a spreading rash, or throat itching  Date Last Reviewed: 10/1/2016  © 1425-9210 [employer]. [address] [place] [address], [place] [address]. All rights reserved. This information is not intended as a substitute for professional medical care. Always follow your healthcare professional's instructions.        Wound Check, Infection  You have a wound that has become infected. The wound will not heal properly unless the infection is cleared. Infection in a wound may also spread if it is not treated. In most cases, antibiotic medicines are prescribed to treat a wound infection.   Symptoms of a wound infection include:  · Redness or swelling around the wound  · Warmth coming from the wound  · New or worsening pain  · Red streaks around the wound  · Draining pus  · Fever  Home care  Follow all directions you are given to treat the infection.  Medicines  Take all medicines as prescribed.   · If you were given antibiotics, take them until they are gone or your healthcare provider tells you to stop. It is vital to finish the antibiotics even if you feel better. If you do not finish them, the infection may come back and be harder to treat.  · If your infection is not responding to the medicines you are taking, you may be prescribed new medicines.  · Take medicine for pain as directed by your healthcare  provider.  Wound care  Care for your wound as directed by your healthcare provider.  · Apply a warm compress (clean cloth soaked in hot water) to the infected area for about 5 to 10 minutes at a time. Be very careful not to burn yourself. Test the cloth on a non-infected area to make sure it is not too hot.  · Continue to change the dressing daily. If it becomes wet, stained with wound fluid, or dirty, change it sooner. To change it:  ¨ Wash your hands with soap and water before changing the dressing.  ¨ Carefully remove the dressing and tape. If it sticks to the wound, you may need to wet it a little to remove it. (Do not do this if your healthcare provider has told you not to.)  ¨ Gently clean the wound with clean water (or saline) using gauze, a clean washcloth, or cotton swab.  ¨ Do not use soap, alcohol, peroxide or other cleansers.  ¨ If you were told to dry the wound before putting on a new dressing, gently pat. Do not rub.  ¨ Throw out the old dressing.  ¨ Wash your hands again before opening the new, clean dressing.  ¨ Wash your hands again when you are done.  Follow-up care  Follow up with your healthcare provider as advised. If a culture was done, you will be notified if your treatment needs to change. Call as directed for the results.  When to seek medical advice  Call your health care provider right away if any of these occur:  · Symptoms of infection don't start to improve within 2 days of starting antibiotics  · Symptoms of infection get worse  · New symptoms, such as red streaks around the wound  · Fever of 100.4°F (38.0°C) or higher for more than 2 days after starting the antibiotics  Date Last Reviewed: 8/10/2015  © 5308-4258 [employer]. [address] [place] [address], [place] [address]. All rights reserved. This information is not intended as a substitute for professional medical care. Always follow your healthcare professional's instructions.

## 2020-04-11 NOTE — PROGRESS NOTES
Subjective:       Patient ID: Isaías Rush is a 24 y.o. female.    Vitals:  tympanic temperature is 98.3 °F (36.8 °C). Her blood pressure is 128/72 and her pulse is 78. Her respiration is 16 and oxygen saturation is 97%.     Chief Complaint: Insect Bite    Insect Bite   This is a new problem. Episode onset: 04/06/2020. The problem has been gradually worsening. Associated symptoms include a rash. Pertinent negatives include no arthralgias, chest pain, chills, congestion, coughing, fatigue, fever, headaches, joint swelling, myalgias, nausea, sore throat, vertigo, vomiting or weakness. Nothing aggravates the symptoms. Treatments tried: cortizone, muporcion. The treatment provided mild relief.       Constitution: Negative for chills, fatigue and fever.   HENT: Negative for facial swelling, congestion and sore throat.    Neck: Negative for painful lymph nodes.   Cardiovascular: Negative for chest pain and leg swelling.   Eyes: Negative for eye itching, double vision, blurred vision and eyelid swelling.   Respiratory: Negative for cough and shortness of breath.    Gastrointestinal: Negative for nausea, vomiting and diarrhea.   Genitourinary: Negative for dysuria, frequency, urgency and history of kidney stones.   Musculoskeletal: Negative for joint pain, joint swelling, muscle cramps and muscle ache.   Skin: Positive for rash and erythema (left lower mid anterior leg, chin region). Negative for color change, pale, wound, abrasion, laceration, lesion, skin thickening/induration, puncture wound, bruising, abscess, avulsion and hives.        Insect bite     Allergic/Immunologic: Negative for environmental allergies, seasonal allergies, immunocompromised state and hives.   Neurological: Negative for dizziness, history of vertigo, light-headedness, passing out and headaches.   Hematologic/Lymphatic: Negative for swollen lymph nodes.   Psychiatric/Behavioral: Negative for nervous/anxious, sleep disturbance and depression.  The patient is not nervous/anxious.        Objective:      Physical Exam   Constitutional: She is oriented to person, place, and time. She appears well-developed and well-nourished.   HENT:   Head: Normocephalic and atraumatic. Head is without abrasion, without contusion and without laceration.   Right Ear: External ear normal.   Left Ear: External ear normal.   Nose: Nose normal.   Mouth/Throat: Oropharynx is clear and moist and mucous membranes are normal.   Eyes: Pupils are equal, round, and reactive to light. Conjunctivae, EOM and lids are normal.   Neck: Trachea normal, full passive range of motion without pain and phonation normal. Neck supple.   Cardiovascular: Normal rate, regular rhythm and normal heart sounds.   Pulmonary/Chest: Effort normal and breath sounds normal. No stridor. No respiratory distress.   Musculoskeletal: Normal range of motion.   Neurological: She is alert and oriented to person, place, and time.   Skin: Skin is warm, dry, intact and no rash. Capillary refill takes less than 2 seconds. Lesions:  erythema (left lower mid anterior leg, chin region) and lesion (left lower mid anterior leg, chin region, slight rough texture, open center dark pink and moist, slightly tender and warm)abrasion, burn, bruising and ecchymosis  Psychiatric: She has a normal mood and affect. Her speech is normal and behavior is normal. Judgment and thought content normal. Cognition and memory are normal.   Nursing note and vitals reviewed.        Assessment:       1. Bacterial skin infection    2. Insect bite of left lower leg, initial encounter        Plan:         Bacterial skin infection  -     sulfamethoxazole-trimethoprim 800-160mg (BACTRIM DS) 800-160 mg Tab; Take 1 tablet by mouth 2 (two) times daily. for 10 days  Dispense: 20 tablet; Refill: 0    Insect bite of left lower leg, initial encounter  -     sulfamethoxazole-trimethoprim 800-160mg (BACTRIM DS) 800-160 mg Tab; Take 1 tablet by mouth 2 (two) times  daily. for 10 days  Dispense: 20 tablet; Refill: 0         Keep affected area clean with antibacterial soap and dry.  Apply cool compresses as advised  Take antibiotic (Bactrim) as prescribed for full course of treatment.  Continue Bactroban ointment.  Continue previously prescribed antihistamine po.  Keep affected area covered when going outside or working.  If symptoms worsen or fail to improve with treatment, fever occurs, or if wound increases in size or fails to respond to antibiotic, see your Primary Care Provider or go to the nearest Emergency Room.

## 2020-04-14 ENCOUNTER — TELEPHONE (OUTPATIENT)
Dept: URGENT CARE | Facility: CLINIC | Age: 25
End: 2020-04-14

## 2020-04-15 ENCOUNTER — PATIENT OUTREACH (OUTPATIENT)
Dept: ADMINISTRATIVE | Facility: OTHER | Age: 25
End: 2020-04-15

## 2020-04-15 ENCOUNTER — OFFICE VISIT (OUTPATIENT)
Dept: DERMATOLOGY | Facility: CLINIC | Age: 25
End: 2020-04-15
Payer: COMMERCIAL

## 2020-04-15 DIAGNOSIS — L25.5 CONTACT DERMATITIS DUE TO PLANTS, EXCEPT FOOD, UNSPECIFIED CONTACT DERMATITIS TYPE: Primary | ICD-10-CM

## 2020-04-15 PROCEDURE — 99202 OFFICE O/P NEW SF 15 MIN: CPT | Mod: 95,,, | Performed by: DERMATOLOGY

## 2020-04-15 PROCEDURE — 99202 PR OFFICE/OUTPT VISIT, NEW, LEVL II, 15-29 MIN: ICD-10-PCS | Mod: 95,,, | Performed by: DERMATOLOGY

## 2020-04-15 RX ORDER — HYDROXYZINE HYDROCHLORIDE 25 MG/1
25 TABLET, FILM COATED ORAL 3 TIMES DAILY PRN
Qty: 30 TABLET | Refills: 1 | Status: SHIPPED | OUTPATIENT
Start: 2020-04-15 | End: 2020-06-09

## 2020-04-15 RX ORDER — BETAMETHASONE DIPROPIONATE 0.5 MG/G
CREAM TOPICAL 2 TIMES DAILY
Qty: 45 G | Refills: 1 | Status: SHIPPED | OUTPATIENT
Start: 2020-04-15 | End: 2020-06-09

## 2020-04-15 NOTE — PROGRESS NOTES
The patient location is: home ([place])  The chief complaint leading to consultation is: rash, insect bite  Visit type: audiovisual  Total time spent with patient: 15 minutes  Each patient to whom he or she provides medical services by telemedicine is:  (1) informed of the relationship between the physician and patient and the respective role of any other health care provider with respect to management of the patient; and (2) notified that he or she may decline to receive medical services by telemedicine and may withdraw from such care at any time.    Notes: see below    Subjective:       Patient ID:  Isaías Rush is a 24 y.o. female who presents for rash, insect bite.     Ms. Rush is a 23 yo F who presents via video visit today for a rash and insect bite on the left lower leg. She first noticed it about one week ago. It has been associated with redness, itch and blistering. She notes that she was working in the yard moving branches a couple of days before the eruption began. She has sought care for this at urgent care where she was started on Bactrim, then switched to Clindamycin because the lesion was worsening. Other treatments include OTC cortisone cream, bactroban.       Review of Systems   Constitutional: Positive for malaise. Negative for fever.   Skin: Positive for itching and rash.        Objective:    Physical Exam   Constitutional: She appears well-developed and well-nourished. No distress.   Neurological: She is alert and oriented to person, place, and time.   Psychiatric: She has a normal mood and affect.   Skin:   Areas Examined (abnormalities noted in diagram):   Head / Face Inspection Performed  Neck Inspection Performed  LLE Inspection Performed              Diagram Legend     Erythematous scaling macule/papule c/w actinic keratosis       Vascular papule c/w angioma      Pigmented verrucoid papule/plaque c/w seborrheic keratosis      Yellow umbilicated papule c/w sebaceous hyperplasia       Irregularly shaped tan macule c/w lentigo     1-2 mm smooth white papules consistent with Milia      Movable subcutaneous cyst with punctum c/w epidermal inclusion cyst      Subcutaneous movable cyst c/w pilar cyst      Firm pink to brown papule c/w dermatofibroma      Pedunculated fleshy papule(s) c/w skin tag(s)      Evenly pigmented macule c/w junctional nevus     Mildly variegated pigmented, slightly irregular-bordered macule c/w mildly atypical nevus      Flesh colored to evenly pigmented papule c/w intradermal nevus       Pink pearly papule/plaque c/w basal cell carcinoma      Erythematous hyperkeratotic cursted plaque c/w SCC      Surgical scar with no sign of skin cancer recurrence      Open and closed comedones      Inflammatory papules and pustules      Verrucoid papule consistent consistent with wart     Erythematous eczematous patches and plaques     Dystrophic onycholytic nail with subungual debris c/w onychomycosis     Umbilicated papule    Erythematous-base heme-crusted tan verrucoid plaque consistent with inflamed seborrheic keratosis     Erythematous Silvery Scaling Plaque c/w Psoriasis     See annotation      Assessment / Plan:        Contact dermatitis due to plants, except food, unspecified contact dermatitis type    START:  -     betamethasone dipropionate (DIPROLENE) 0.05 % cream; Apply topically 2 (two) times daily. Apply to affected area of leg  Dispense: 45 g; Refill: 1. Recommend under occlusion for nighttime application. Use for 1 week then stop.   -     hydroxyzine HCL (ATARAX) 25 MG tablet; Take 1 tablet (25 mg total) by mouth 3 (three) times daily as needed for Itching.  Dispense: 30 tablet; Refill: 1    Also recommend cool compresses and Cerave anti-itch lotion.          Follow up in about 1 week (around 4/22/2020).

## 2020-04-17 ENCOUNTER — PATIENT MESSAGE (OUTPATIENT)
Dept: DERMATOLOGY | Facility: CLINIC | Age: 25
End: 2020-04-17

## 2020-04-17 NOTE — TELEPHONE ENCOUNTER
Called patient to discuss. We discussed avoiding application of benadryl gel and occluding the area. We will stick with the betamethasone cream even up to three times per day for the weekend along with continuation of the oral clindamycin and consider po steroids cautiously if no improvement by then.   Lakhwinder Bobo MD  Department of Dermatology, Mohs Surgery  2:10 PM  4/17/2020

## 2020-04-21 DIAGNOSIS — Z01.84 ANTIBODY RESPONSE EXAMINATION: ICD-10-CM

## 2020-04-23 ENCOUNTER — LAB VISIT (OUTPATIENT)
Dept: LAB | Facility: HOSPITAL | Age: 25
End: 2020-04-23
Attending: INTERNAL MEDICINE
Payer: COMMERCIAL

## 2020-04-23 ENCOUNTER — PATIENT OUTREACH (OUTPATIENT)
Dept: ADMINISTRATIVE | Facility: OTHER | Age: 25
End: 2020-04-23

## 2020-04-23 ENCOUNTER — OFFICE VISIT (OUTPATIENT)
Dept: DERMATOLOGY | Facility: CLINIC | Age: 25
End: 2020-04-23
Payer: COMMERCIAL

## 2020-04-23 DIAGNOSIS — L25.5 CONTACT DERMATITIS DUE TO PLANTS, EXCEPT FOOD, UNSPECIFIED CONTACT DERMATITIS TYPE: Primary | ICD-10-CM

## 2020-04-23 DIAGNOSIS — Z01.84 ANTIBODY RESPONSE EXAMINATION: ICD-10-CM

## 2020-04-23 LAB — SARS-COV-2 IGG SERPL QL IA: NEGATIVE

## 2020-04-23 PROCEDURE — 36415 COLL VENOUS BLD VENIPUNCTURE: CPT

## 2020-04-23 PROCEDURE — 86769 SARS-COV-2 COVID-19 ANTIBODY: CPT

## 2020-04-23 PROCEDURE — 99213 OFFICE O/P EST LOW 20 MIN: CPT | Mod: 95,,, | Performed by: DERMATOLOGY

## 2020-04-23 PROCEDURE — 99213 PR OFFICE/OUTPT VISIT, EST, LEVL III, 20-29 MIN: ICD-10-PCS | Mod: 95,,, | Performed by: DERMATOLOGY

## 2020-04-23 NOTE — PROGRESS NOTES
The patient location is: home ([place])  The chief complaint leading to consultation is: rash, insect bite  Visit type: audiovisual  Total time spent with patient: 15 minutes  Each patient to whom he or she provides medical services by telemedicine is:  (1) informed of the relationship between the physician and patient and the respective role of any other health care provider with respect to management of the patient; and (2) notified that he or she may decline to receive medical services by telemedicine and may withdraw from such care at any time.    Notes: see below    Subjective:       Patient ID:  Isaías Rush is a 24 y.o. female who presents for rash, insect bite.     Ms. Rush is a 23 yo F who presents via video visit today for f/u contact dermatitis. She states she is doing better. She is using the betamethasone cream and hydroxyzine. The rash has reduced in size and the itching began to improve 1-2 days ago. She is pleased with her progress.        Review of Systems   Constitutional: Negative for fever and malaise.   Skin: Positive for itching and rash.        Objective:    Physical Exam   Constitutional: She appears well-developed and well-nourished. No distress.   Neurological: She is alert and oriented to person, place, and time.   Psychiatric: She has a normal mood and affect.   Skin:   Areas Examined (abnormalities noted in diagram):   Head / Face Inspection Performed  Neck Inspection Performed  LLE Inspection Performed              Diagram Legend     Erythematous scaling macule/papule c/w actinic keratosis       Vascular papule c/w angioma      Pigmented verrucoid papule/plaque c/w seborrheic keratosis      Yellow umbilicated papule c/w sebaceous hyperplasia      Irregularly shaped tan macule c/w lentigo     1-2 mm smooth white papules consistent with Milia      Movable subcutaneous cyst with punctum c/w epidermal inclusion cyst      Subcutaneous movable cyst c/w pilar cyst      Firm pink to  brown papule c/w dermatofibroma      Pedunculated fleshy papule(s) c/w skin tag(s)      Evenly pigmented macule c/w junctional nevus     Mildly variegated pigmented, slightly irregular-bordered macule c/w mildly atypical nevus      Flesh colored to evenly pigmented papule c/w intradermal nevus       Pink pearly papule/plaque c/w basal cell carcinoma      Erythematous hyperkeratotic cursted plaque c/w SCC      Surgical scar with no sign of skin cancer recurrence      Open and closed comedones      Inflammatory papules and pustules      Verrucoid papule consistent consistent with wart     Erythematous eczematous patches and plaques     Dystrophic onycholytic nail with subungual debris c/w onychomycosis     Umbilicated papule    Erythematous-base heme-crusted tan verrucoid plaque consistent with inflamed seborrheic keratosis     Erythematous Silvery Scaling Plaque c/w Psoriasis     See annotation      Assessment / Plan:        Contact dermatitis due to plants, except food, unspecified contact dermatitis type  CONTINUE:  -     betamethasone dipropionate (DIPROLENE) 0.05 % cream; Apply topically 2 (two) times daily. Apply to affected area of leg  Dispense: 45 g; Refill: 1. Can use for two weeks then stop.   -     hydroxyzine HCL (ATARAX) 25 MG tablet; Take 1 tablet (25 mg total) by mouth 3 (three) times daily as needed for Itching.  Dispense: 30 tablet; Refill: 1    Also recommend vaseline or aquaphor BID to areas of healing erosions.     Patient would like to consider patch testing in the future to determine what she is reacting to. She will contact us in the near future once this acute issue has resolved.     F/u prn.

## 2020-04-27 ENCOUNTER — TELEPHONE (OUTPATIENT)
Dept: ALLERGY | Facility: CLINIC | Age: 25
End: 2020-04-27

## 2020-04-27 NOTE — TELEPHONE ENCOUNTER
Left message for Laila to call.  ----- Message from Fanta Hernandez sent at 4/27/2020  8:45 AM CDT -----  Contact: Laila- LEIGHA Ulloa with [facility] requesting a call back regarding referral sent in for pt medication Hizentra. She states that she is needing more information. Please call [facility] back at [phone]

## 2020-04-28 ENCOUNTER — TELEPHONE (OUTPATIENT)
Dept: ALLERGY | Facility: CLINIC | Age: 25
End: 2020-04-28

## 2020-04-28 ENCOUNTER — PATIENT MESSAGE (OUTPATIENT)
Dept: ALLERGY | Facility: CLINIC | Age: 25
End: 2020-04-28

## 2020-04-28 NOTE — TELEPHONE ENCOUNTER
I left message for patient to call so we can schedule a Telemedicine visit and talk about getting her records from her previous Allergist.

## 2020-04-28 NOTE — TELEPHONE ENCOUNTER
I spoke to patient about getting her previous allergy records.Between 6/2018-12/2019 she saw Dr.Erin Moore in [place], [place].ph# [phone].I left a mess at her office to call regarding getting these records.Patients last name at that time was Radha.Patient made aware of leaving a message for 's staff. Patient will try and call her office as well.    ----- Message from Jacklyn Catalan sent at 4/28/2020  1:38 PM CDT -----  Contact: Patient  Patient is returning a call, please call them back at [phone]. Thank you

## 2020-04-28 NOTE — TELEPHONE ENCOUNTER
Hi,  I do not see the outside records from her allergist.  Please call and ask them to send them over, if they are not in the chart.  Please have her schedule a telemedicine visit as well.  Thanks,  ISABEL

## 2020-04-28 NOTE — TELEPHONE ENCOUNTER
I spoke to Julianne with [facility].The pharmacy is needing 1)Clinicals within the year  2)Ig levels within the year  3)pre and post titers on vaccine challenge.  Please fax to #[phone].  Not sure what #3 is/and Julianne wasn't sure either.  ----- Message from Marta Toro sent at 4/28/2020 11:35 AM CDT -----  Contact: Julianne/ [facility]  Julianne was calling to speak back to Nicky however we were unable to reach you. She can be reached at [phone].        Thanks,  Marta Toro

## 2020-04-29 ENCOUNTER — PATIENT MESSAGE (OUTPATIENT)
Dept: OBSTETRICS AND GYNECOLOGY | Facility: CLINIC | Age: 25
End: 2020-04-29

## 2020-05-01 ENCOUNTER — TELEPHONE (OUTPATIENT)
Dept: ALLERGY | Facility: CLINIC | Age: 25
End: 2020-05-01

## 2020-05-01 NOTE — TELEPHONE ENCOUNTER
I spoke to patient and she said her Allergist in [facility] is suppose to be sending records to .If the records are not received by 5/4/20 P.M. I am to call patient and let her know.  ----- Message from Nirmala Velasquez MD sent at 5/1/2020  8:38 AM CDT -----  Not unless it takes over two weeks to get the records  ----- Message -----  From: Nicky Mchugh LPN  Sent: 5/1/2020   8:11 AM CDT  To: MD Latosha Ly, I am trying to get records on this patient.The patient is trying to have records sent as well.She will probably need to sign release.My question is do you want her to schedule telemed  appointment prior to receiving records?///nd

## 2020-05-04 ENCOUNTER — TELEPHONE (OUTPATIENT)
Dept: ALLERGY | Facility: CLINIC | Age: 25
End: 2020-05-04

## 2020-05-04 NOTE — TELEPHONE ENCOUNTER
Patient is going to have her previous allergist send records. Patient's last name at that time was Radha.  ----- Message from Akila Mariano sent at 5/4/2020  9:07 AM CDT -----  Contact: [facility] - Ms Anne  Stated she will like a call about being out of network, she can be reached at [phone]

## 2020-05-05 ENCOUNTER — TELEPHONE (OUTPATIENT)
Dept: PHARMACY | Facility: CLINIC | Age: 25
End: 2020-05-05

## 2020-05-05 NOTE — TELEPHONE ENCOUNTER
Call attempt #4 to determine if patient was able to receive Hizentra from [facility]. No answer; LVM.

## 2020-05-06 ENCOUNTER — TELEPHONE (OUTPATIENT)
Dept: ALLERGY | Facility: CLINIC | Age: 25
End: 2020-05-06

## 2020-05-06 NOTE — TELEPHONE ENCOUNTER
I spoke to Dayana at [facility] specialty pharmacy and she said they are not in patient's network. She will triage in formation to [facility] #[phone].    -- Message from Negra Rouse sent at 5/6/2020  8:18 AM CDT -----  Contact: Dayana - [facility]  Request a call from Nicky concerning medical records received on this pt, can be reached at  [phone]///thxMW

## 2020-05-12 ENCOUNTER — PATIENT OUTREACH (OUTPATIENT)
Dept: ADMINISTRATIVE | Facility: OTHER | Age: 25
End: 2020-05-12

## 2020-05-13 ENCOUNTER — OFFICE VISIT (OUTPATIENT)
Dept: ALLERGY | Facility: CLINIC | Age: 25
End: 2020-05-13
Payer: COMMERCIAL

## 2020-05-13 DIAGNOSIS — D84.9 IMMUNODEFICIENCY: Primary | ICD-10-CM

## 2020-05-13 PROCEDURE — 99213 OFFICE O/P EST LOW 20 MIN: CPT | Mod: 95,,, | Performed by: ALLERGY & IMMUNOLOGY

## 2020-05-13 PROCEDURE — 99213 PR OFFICE/OUTPT VISIT, EST, LEVL III, 20-29 MIN: ICD-10-PCS | Mod: 95,,, | Performed by: ALLERGY & IMMUNOLOGY

## 2020-05-13 NOTE — PROGRESS NOTES
"The patient location is: [place], [place]  The chief complaint leading to consultation is: immunodeficiency  Visit type: audiovisual  Total time spent with patient: 15 minutes  Each patient to whom he or she provides medical services by telemedicine is:  (1) informed of the relationship between the physician and patient and the respective role of any other health care provider with respect to management of the patient; and (2) notified that he or she may decline to receive medical services by telemedicine and may withdraw from such care at any time.    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 She has not received IVIG.  " I am really allergic to either poison oak or poision ivy."- Started April 11.  She denies having a sinus infection.  She had bronchitis in march and required antibiotics. She was not hospitalized.        Past Medical History:   Diagnosis Date    Asthma     Dysfunctional gallbladder     Gastritis     GERD (gastroesophageal reflux disease)     Gluten intolerance     Lactose intolerance        Family History   Problem Relation Age of Onset    JESSICA disease Mother     JESSICA disease Maternal Grandmother     Thyroid disease Maternal Grandmother     Arthritis Maternal Grandmother     Heart disease Paternal Grandfather     Breast cancer Other        Current Outpatient Medications on File Prior to Visit   Medication Sig Dispense Refill    amitriptyline (ELAVIL) 10 MG tablet Take 1 tablet (10 mg total) by mouth once daily. 90 tablet 1    benzonatate (TESSALON) 200 MG capsule Take 1 capsule (200 mg total) by mouth 3 (three) times daily as needed. (Patient not taking: Reported on 4/11/2020) 30 capsule 0    betamethasone dipropionate (DIPROLENE) 0.05 % cream Apply topically 2 (two) times daily. Apply to affected area of leg 45 g 1    cholecalciferol, vitamin D3, 50,000 unit capsule 25,000 Units.       clindamycin (CLEOCIN) 300 MG capsule Take 2 capsules (600 mg total) by " mouth every 8 (eight) hours for 10 days 60 capsule 10    diphenhydrAMINE (SOMINEX) 25 mg tablet Take 1 tablet (25 mg total) by mouth every 8 (eight) hours for 10 days 30 tablet 0    EPINEPHrine (SYMJEPI) 0.3 mg/0.3 mL Syrg Inject 0.3 mLs (0.3 mg total) as directed as needed. (Patient not taking: Reported on 4/11/2020) 2 each 3    guaifenesin-codeine 100-10 mg/5 ml (TUSSI-ORGANIDIN NR)  mg/5 mL syrup Take 10 mLs by mouth every 4 to 6 hours as needed. (Patient not taking: Reported on 4/11/2020) 120 mL 0    hydroxyzine HCL (ATARAX) 25 MG tablet Take 1 tablet (25 mg total) by mouth 3 (three) times daily as needed for Itching. 30 tablet 1    montelukast (SINGULAIR) 10 mg tablet Take 10 mg by mouth once daily.      montelukast (SINGULAIR) 10 mg tablet Take 1 tablet (10 mg total) by mouth every evening. 30 tablet 11    pantoprazole (PROTONIX) 40 MG tablet TAKE 1 TABLET BY MOUTH AT LEAST 30 MINUTES BEFORE BREAKFAST. (Patient not taking: Reported on 4/11/2020) 30 tablet 1     No current facility-administered medications on file prior to visit.        Review of patient's allergies indicates:   Allergen Reactions    Cefprozil Hives    Fire ant      anaphylaxis     Social History:  Dogs- 2, not a smoker    ROS:  No fever, no chills  No itchy eyes, no watery  No runny nose, no nasal congestion  No chest pain, no palpitation  No shortness of breath, no wheezing  No nausea, no vomiting  Skin rash, no wound  No lightheaded, not dizzy  No bruising, no swollen lymph nodes  No joint pain, no swollen joints    Physicial Exam  Alert and oriented  NCAT  NAD        Immunodeficiency  -     CBC auto differential; Future; Expected date: 05/13/2020  -     IgG 1, 2, 3, and 4; Future; Expected date: 05/13/2020  -     IgG; Future; Expected date: 05/13/2020  -     IgA; Future; Expected date: 05/13/2020  -     IgM; Future; Expected date: 05/13/2020  -     Haemophilius influenzae B Ab IgG; Future; Expected date: 05/13/2020  -      S.pneumoniae IgG Serotypes; Future; Expected date: 05/13/2020  -     Lymphocyte Profile II; Future; Expected date: 05/13/2020  -     DIPHTHERIA / TETANUS ANTIBODY PANEL; Future; Expected date: 05/13/2020    Will contact with lab results, once they become available.

## 2020-05-14 ENCOUNTER — HOSPITAL ENCOUNTER (EMERGENCY)
Facility: HOSPITAL | Age: 25
Discharge: HOME | End: 2020-05-14
Attending: EMERGENCY MEDICINE
Payer: COMMERCIAL

## 2020-05-14 VITALS
RESPIRATION RATE: 18 BRPM | DIASTOLIC BLOOD PRESSURE: 103 MMHG | HEART RATE: 100 BPM | BODY MASS INDEX: 45.74 KG/M2 | TEMPERATURE: 98 F | WEIGHT: 248.56 LBS | SYSTOLIC BLOOD PRESSURE: 190 MMHG | OXYGEN SATURATION: 100 % | HEIGHT: 62 IN

## 2020-05-14 DIAGNOSIS — S90.32XA CONTUSION OF LEFT FOOT, INITIAL ENCOUNTER: Primary | ICD-10-CM

## 2020-05-14 DIAGNOSIS — M79.673 FOOT PAIN: ICD-10-CM

## 2020-05-14 PROCEDURE — 99283 EMERGENCY DEPT VISIT LOW MDM: CPT | Mod: 25

## 2020-05-14 RX ORDER — TRAMADOL HYDROCHLORIDE 50 MG/1
50 TABLET ORAL EVERY 6 HOURS PRN
Qty: 12 TABLET | Refills: 0 | Status: SHIPPED | OUTPATIENT
Start: 2020-05-14 | End: 2020-06-09

## 2020-05-14 NOTE — ED PROVIDER NOTES
HISTORY     Chief Complaint   Patient presents with    Fall     fell at work, twisted ankle and having L foot pain     Review of patient's allergies indicates:   Allergen Reactions    Fire ant      anaphylaxis    Cefprozil Hives    Poison ivy extract         HPI   The history is provided by the patient. No  was used.   Foot Injury    Incident location: pt reports she twisted her ankle while walking and has pain to the lateral aspect of left foot. The incident occurred just prior to arrival. Pain location: left foot. The quality of the pain is described as aching. The pain has been constant since onset. Pertinent negatives include no numbness, no inability to bear weight, no loss of motion, no muscle weakness, no loss of sensation and no tingling. She reports no foreign bodies present. The symptoms are aggravated by bearing weight and activity. She has tried nothing for the symptoms.        PCP: Junito Del Valle MD     Past Medical History:  Past Medical History:   Diagnosis Date    Asthma     Dysfunctional gallbladder     Gastritis     GERD (gastroesophageal reflux disease)     Gluten intolerance     Lactose intolerance         Past Surgical History:  Past Surgical History:   Procedure Laterality Date    CHOLECYSTECTOMY      COLONOSCOPY  12/2018    ESOPHAGOGASTRODUODENOSCOPY N/A 2/5/2020    Procedure: EGD (ESOPHAGOGASTRODUODENOSCOPY);  Surgeon: Barbara Ruby MD;  Location: [facility];  Service: Endoscopy;  Laterality: N/A;    GALLBLADDER SURGERY Bilateral 09/2018    WRIST ARTHROPLASTY Bilateral 2009        Family History:  Family History   Problem Relation Age of Onset    JESSICA disease Mother     JESSICA disease Maternal Grandmother     Thyroid disease Maternal Grandmother     Arthritis Maternal Grandmother     Heart disease Paternal Grandfather     Breast cancer Other         Social History:  Social History     Tobacco Use    Smoking status: Never Smoker    Smokeless  tobacco: Never Used   Substance and Sexual Activity    Alcohol use: Yes     Comment: rare    Drug use: Never    Sexual activity: Yes     Partners: Male         ROS   Review of Systems   Constitutional: Negative for chills, fatigue and fever.   HENT: Negative for sore throat.    Respiratory: Negative for shortness of breath.    Cardiovascular: Negative for chest pain.   Gastrointestinal: Negative for nausea.   Genitourinary: Negative for dysuria.   Musculoskeletal: Positive for arthralgias (left foot). Negative for back pain.   Skin: Negative for rash.   Neurological: Negative for tingling, weakness and numbness.   Hematological: Does not bruise/bleed easily.   Psychiatric/Behavioral: Negative for agitation.       PHYSICAL EXAM     Initial Vitals [05/14/20 1637]   BP Pulse Resp Temp SpO2   (!) 190/103 100 18 97.5 °F (36.4 °C) 100 %      MAP       --           Physical Exam    Nursing note and vitals reviewed.  Constitutional: She appears well-developed and well-nourished. She is not diaphoretic. No distress.   HENT:   Head: Normocephalic and atraumatic.   Right Ear: External ear normal.   Left Ear: External ear normal.   Mouth/Throat: Oropharynx is clear and moist. No oropharyngeal exudate.   Eyes: Conjunctivae are normal. Right eye exhibits no discharge. Left eye exhibits no discharge.   Neck: Normal range of motion. Neck supple.   Cardiovascular: Normal rate, regular rhythm and normal heart sounds.   Pulmonary/Chest: Breath sounds normal. No respiratory distress. She has no wheezes. She has no rhonchi. She has no rales.   Abdominal: Soft. Bowel sounds are normal.   Musculoskeletal: Normal range of motion.   Mild swelling and tenderness along the lateral aspect of left foot. DP and PT pulses 2+. Cap refill <2sec. LLE NVI. Pt able to ambulate on foot   Neurological: She is alert and oriented to person, place, and time. She has normal strength.   Skin: Skin is warm and dry.   Psychiatric: She has a normal mood and  "affect. Her behavior is normal. Thought content normal.          ED COURSE   Orthopedic Injury  Date/Time: 5/14/2020 5:25 PM  Performed by: Andrew Granados NP  Authorized by: Andrew Castro MD     Consent Done?:  Yes  Universal Protocol:     Verbal consent obtained?: Yes      Written consent obtained?: No      Risks and benefits: Risks, benefits and alternatives were discussed      Consent given by:  Patient    Patient states understanding of procedure being performed: Yes      Patient's understanding of procedure matches consent: Yes      Procedure consent matches procedure scheduled: Yes      Patient identity confirmed:  Name  Injury:     Injury location:  Foot    Location details:  Left foot    Injury type:  Soft tissue      Pre-procedure assessment:     Neurovascular status: Neurovascularly intact      Distal perfusion: normal      Neurological function: normal      Range of motion: normal      Local anesthesia used?: No      Patient sedated?: No        Selections made in this section will also lock the Injury type section above.:     Immobilization: ace wrap.    Splint type: ace wrap.    Supplies used:  Elastic bandage  Post-procedure assessment:     Neurovascular status: Neurovascularly intact      Distal perfusion: normal      Neurological function: normal      Range of motion: normal      Patient tolerance:  Patient tolerated the procedure well with no immediate complications      ED ONGOING VITALS:  Vitals:    05/14/20 1637   BP: (!) 190/103   Pulse: 100   Resp: 18   Temp: 97.5 °F (36.4 °C)   TempSrc: Oral   SpO2: 100%   Weight: 112.8 kg (248 lb 9.1 oz)   Height: 5' 2" (1.575 m)         ABNORMAL LAB VALUES:  Labs Reviewed - No data to display      ALL LAB VALUES:  none      RADIOLOGY STUDIES:  Imaging Results          X-Ray Foot Complete Left (Final result)  Result time 05/14/20 17:16:30    Final result by Jonatan Narayanan MD (05/14/20 17:16:30)                 Impression:      Normal " study.      Electronically signed by: Jonatan Narayanan MD  Date:    05/14/2020  Time:    17:16             Narrative:    EXAMINATION:  XR FOOT COMPLETE 3 VIEW LEFT    CLINICAL HISTORY:  - Pain in unspecified foot.  Left side.    COMPARISON:  None    FINDINGS:  No osseous, articular, or soft tissue abnormality.                                          The above vital signs and test results have been reviewed by the emergency provider.     ED Medications:  Current Discharge Medication List        Discharge Medications:  New Prescriptions    TRAMADOL (ULTRAM) 50 MG TABLET    Take 1 tablet (50 mg total) by mouth every 6 (six) hours as needed for Pain.      Follow-up Information     Junito Del Valle MD.    Specialty:  Family Medicine  Why:  As needed  Contact information:  [address] [facility] [address] [phone]                  5:26 PM    I discussed with patient and/or family/caretaker that evaluation in the ED does not suggest any emergent or life threatening medical conditions requiring immediate intervention beyond what was provided in the ED, and I believe patient is safe for discharge. Regardless, an unremarkable evaluation in the ED does not preclude the development or presence of a serious or life threatening condition. As such, patient was instructed to return immediately for any worsening or change in current symptoms.    Pre-hypertension/Hypertension: The pt has been informed that they may have pre-hypertension or hypertension based on a blood pressure reading in the ED. I recommend that the pt call the PCP listed on their discharge instructions or a physician of their choice this week to arrange f/u for further evaluation of possible pre-hypertension or hypertension.       MEDICAL DECISION MAKING                 CLINICAL IMPRESSION       ICD-10-CM ICD-9-CM   1. Contusion of left foot, initial encounter S90.32XA 924.20   2. Foot pain M79.673 729.5       Disposition:   Disposition:  Discharged  Condition: Stable         Andrew Granados, YAIR  05/14/20 7028

## 2020-05-14 NOTE — ED NOTES
Pt examined by YAIR Morales without LPN, educated on prescriptions, given discharge instructions and discharged to [facility]. See provider notes for exam.

## 2020-05-15 ENCOUNTER — OFFICE VISIT (OUTPATIENT)
Dept: INTERNAL MEDICINE | Facility: CLINIC | Age: 25
End: 2020-05-15
Payer: COMMERCIAL

## 2020-05-15 ENCOUNTER — LAB VISIT (OUTPATIENT)
Dept: LAB | Facility: HOSPITAL | Age: 25
End: 2020-05-15
Attending: FAMILY MEDICINE
Payer: COMMERCIAL

## 2020-05-15 ENCOUNTER — PATIENT MESSAGE (OUTPATIENT)
Dept: INTERNAL MEDICINE | Facility: CLINIC | Age: 25
End: 2020-05-15

## 2020-05-15 ENCOUNTER — HOSPITAL ENCOUNTER (OUTPATIENT)
Dept: RADIOLOGY | Facility: HOSPITAL | Age: 25
Discharge: HOME | End: 2020-05-15
Attending: PHYSICIAN ASSISTANT
Payer: COMMERCIAL

## 2020-05-15 ENCOUNTER — TELEPHONE (OUTPATIENT)
Dept: ALLERGY | Facility: CLINIC | Age: 25
End: 2020-05-15

## 2020-05-15 VITALS
BODY MASS INDEX: 45.15 KG/M2 | SYSTOLIC BLOOD PRESSURE: 130 MMHG | OXYGEN SATURATION: 99 % | HEIGHT: 62 IN | DIASTOLIC BLOOD PRESSURE: 86 MMHG | HEART RATE: 76 BPM | WEIGHT: 245.38 LBS | TEMPERATURE: 97 F

## 2020-05-15 DIAGNOSIS — M76.72 PERONEAL TENDINITIS OF LEFT LOWER EXTREMITY: ICD-10-CM

## 2020-05-15 DIAGNOSIS — M79.672 LEFT FOOT PAIN: Primary | ICD-10-CM

## 2020-05-15 DIAGNOSIS — M79.672 LEFT FOOT PAIN: ICD-10-CM

## 2020-05-15 DIAGNOSIS — D84.9 IMMUNODEFICIENCY: ICD-10-CM

## 2020-05-15 LAB
BASOPHILS # BLD AUTO: 0.03 K/UL (ref 0–0.2)
BASOPHILS NFR BLD: 0.7 % (ref 0–1.9)
DIFFERENTIAL METHOD: ABNORMAL
EOSINOPHIL # BLD AUTO: 0.1 K/UL (ref 0–0.5)
EOSINOPHIL NFR BLD: 1.7 % (ref 0–8)
ERYTHROCYTE [DISTWIDTH] IN BLOOD BY AUTOMATED COUNT: 12.2 % (ref 11.5–14.5)
HCT VFR BLD AUTO: 41.5 % (ref 37–48.5)
HGB BLD-MCNC: 13.1 G/DL (ref 12–16)
IGA SERPL-MCNC: 204 MG/DL (ref 40–350)
IGG SERPL-MCNC: 1037 MG/DL (ref 650–1600)
IGM SERPL-MCNC: 96 MG/DL (ref 50–300)
IMM GRANULOCYTES # BLD AUTO: 0.01 K/UL (ref 0–0.04)
IMM GRANULOCYTES NFR BLD AUTO: 0.2 % (ref 0–0.5)
LYMPHOCYTES # BLD AUTO: 1.5 K/UL (ref 1–4.8)
LYMPHOCYTES NFR BLD: 36.7 % (ref 18–48)
MCH RBC QN AUTO: 30 PG (ref 27–31)
MCHC RBC AUTO-ENTMCNC: 31.6 G/DL (ref 32–36)
MCV RBC AUTO: 95 FL (ref 82–98)
MONOCYTES # BLD AUTO: 0.3 K/UL (ref 0.3–1)
MONOCYTES NFR BLD: 6.1 % (ref 4–15)
NEUTROPHILS # BLD AUTO: 2.2 K/UL (ref 1.8–7.7)
NEUTROPHILS NFR BLD: 54.6 % (ref 38–73)
NRBC BLD-RTO: 0 /100 WBC
PLATELET # BLD AUTO: 320 K/UL (ref 150–350)
PMV BLD AUTO: 11.5 FL (ref 9.2–12.9)
RBC # BLD AUTO: 4.36 M/UL (ref 4–5.4)
WBC # BLD AUTO: 4.11 K/UL (ref 3.9–12.7)

## 2020-05-15 PROCEDURE — 82784 ASSAY IGA/IGD/IGG/IGM EACH: CPT

## 2020-05-15 PROCEDURE — 86357 NK CELLS TOTAL COUNT: CPT

## 2020-05-15 PROCEDURE — 99999 PR PBB SHADOW E&M-EST. PATIENT-LVL V: CPT | Mod: PBBFAC,,, | Performed by: PHYSICIAN ASSISTANT

## 2020-05-15 PROCEDURE — 82784 ASSAY IGA/IGD/IGG/IGM EACH: CPT | Mod: 59

## 2020-05-15 PROCEDURE — 99213 PR OFFICE/OUTPT VISIT, EST, LEVL III, 20-29 MIN: ICD-10-PCS | Mod: S$GLB,,, | Performed by: PHYSICIAN ASSISTANT

## 2020-05-15 PROCEDURE — 86317 IMMUNOASSAY INFECTIOUS AGENT: CPT | Mod: 59

## 2020-05-15 PROCEDURE — 86360 T CELL ABSOLUTE COUNT/RATIO: CPT

## 2020-05-15 PROCEDURE — 99213 OFFICE O/P EST LOW 20 MIN: CPT | Mod: S$GLB,,, | Performed by: PHYSICIAN ASSISTANT

## 2020-05-15 PROCEDURE — 99999 PR PBB SHADOW E&M-EST. PATIENT-LVL V: ICD-10-PCS | Mod: PBBFAC,,, | Performed by: PHYSICIAN ASSISTANT

## 2020-05-15 PROCEDURE — 86359 T CELLS TOTAL COUNT: CPT

## 2020-05-15 PROCEDURE — 86684 HEMOPHILUS INFLUENZA ANTIBDY: CPT

## 2020-05-15 PROCEDURE — 86774 TETANUS ANTIBODY: CPT

## 2020-05-15 PROCEDURE — 73630 X-RAY EXAM OF FOOT: CPT | Mod: 26,LT,, | Performed by: RADIOLOGY

## 2020-05-15 PROCEDURE — 86355 B CELLS TOTAL COUNT: CPT

## 2020-05-15 PROCEDURE — 73630 XR FOOT COMPLETE 3 VIEW LEFT: ICD-10-PCS | Mod: 26,LT,, | Performed by: RADIOLOGY

## 2020-05-15 PROCEDURE — 73630 X-RAY EXAM OF FOOT: CPT | Mod: TC,LT

## 2020-05-15 PROCEDURE — 82787 IGG 1 2 3 OR 4 EACH: CPT

## 2020-05-15 PROCEDURE — 85025 COMPLETE CBC W/AUTO DIFF WBC: CPT

## 2020-05-15 NOTE — TELEPHONE ENCOUNTER
Spoke with [facility] and she stated an authorization order form needs to be completed for pt to receive Hizentra via [facility] pharmacy. Fax number [phone] provided for form to be received by our office.

## 2020-05-15 NOTE — PATIENT INSTRUCTIONS
What Is Tendonitis of the Foot?  When you use a set of muscles too much, youre likely to strain the tendons (soft tissues) that connect those muscles to your bones. At first, pain or swelling may come and go quickly. But if you do too much too soon, your muscles may overtire again. The strain may cause a tendons outer covering to swell or small fibers in a tendon to pull apart. If you keep pushing your muscles, damage to the tendons adds up, and tendonitis develops. Over time, pain and swelling may limit your activities. But with your doctors help, tendonitis can be controlled. Both your symptoms and your risk of future problems including tendon rupture can be reduced.       The back of your foot  The Achilles tendon connects the calf muscle to the heel bone. If tendonitis occurs here, you may feel pain when your foot touches down or when your heel lifts off the ground.   The front of your foot  The anterior tibial tendon helps control the front of your foot when it meets the ground. If this tendon is strained, you may feel pain when you go down stairs or walk or run on hills.     The inside of your foot  The posterior tibial tendon runs along the inside of the ankle and foot. If this tendon is strained, your foot may hurt when it moves forward to push off the ground. Or you may feel pain when your heel shifts from side to side.   The outside of your foot  The peroneal tendon wraps across the bottom of your foot, from the outside to the inside. Tendonitis here may cause pain when you stand or push off the ground and when walking on uneven surfaces.   Date Last Reviewed: 9/21/2015 © 2000-2017 [employer]. [address] [place] [address], [place] [address]. All rights reserved. This information is not intended as a substitute for professional medical care. Always follow your healthcare professional's instructions.        Dorsiflexion/Plantarflexion (Flexibility)    These exercises are for your right foot.  Switch sides for your left foot.  1. Sit on a bed or the floor with your right leg out straight.  2. Flex your right foot back, pushing your heel forward and pulling your toes toward you. This is dorsiflexion. Hold for 5 seconds.  3. Then move your foot in the opposite direction, pointing your foot and toes away from you. This is plantarflexion. Hold for 5 seconds.  4. Repeat 5 times, or as instructed.  5. Then slightly bend your right knee and repeat.  Date Last Reviewed: 3/10/2016  © 8237-3395 [employer]. [address] [place] [address]. All rights reserved. This information is not intended as a substitute for professional medical care. Always follow your healthcare professional's instructions.        Treating Tendonitis of the Foot  Your healthcare provider's first concern is to reduce your symptoms. Using ice and heat, taking medicines, and limiting activity help control pain and swelling. Follow all of your healthcare provider's instructions. Returning to activity too soon may cause your symptoms to come back.    Ice and heat  Ice helps prevent swelling and reduce pain. Place ice on the painful area for 10 to 15 minutes. Repeat the icing several times a day. If ?you have had the problem for a while, using heat may help. Apply a heating pad or hot towels to the tendon for 20 to 30 minutes 2 or 3 times a day.  Medicines  Your healthcare provider may tell you to take ibuprofen or other anti-inflammatory medicines. These reduce pain and swelling. Take them as directed. Dont wait until you feel pain. In more severe cases, cortisone may be injected to relieve pain.  Limiting activities  Rest allows the tissues in your foot to heal. Stay off your feet for a few days, then slowly work back into activity. If you do high-impact activities, such as running or aerobics, try other activities that place less strain on your foot. Cycling and swimming are good choices.  Date Last Reviewed:  9/21/2015  © 6459-6733 [employer], [employer]. [address] [place] [address], [place] [address]. All rights reserved. This information is not intended as a substitute for professional medical care. Always follow your healthcare professional's instructions.

## 2020-05-15 NOTE — PROGRESS NOTES
Subjective:      Patient ID: Isaías Rush is a 24 y.o. female.    Chief Complaint: Follow-up and Foot Pain    Foot Injury    The incident occurred 12 to 24 hours ago. The incident occurred at work. The injury mechanism was a twisting injury. Pain location: top of left lateral foot. The quality of the pain is described as aching. The pain is moderate. Pertinent negatives include no inability to bear weight, loss of motion, loss of sensation, muscle weakness, numbness or tingling. She reports no foreign bodies present. The symptoms are aggravated by weight bearing. She has tried ice and NSAIDs for the symptoms. The treatment provided mild relief.   Patient slipped on a wet floor at work yesterday. No exactly sure now the injury occurred but she thinks it was a twisting motion. Her pain is localized to her left latera foot.   Pt susanne to ER after the incident at work. Xray was normal.     Review of Systems   Constitutional: Negative for activity change, appetite change, chills, diaphoresis, fatigue, fever and unexpected weight change.   HENT: Negative.  Negative for congestion, hearing loss, postnasal drip, rhinorrhea, sore throat, trouble swallowing and voice change.    Eyes: Negative.  Negative for visual disturbance.   Respiratory: Negative.  Negative for cough, choking, chest tightness and shortness of breath.    Cardiovascular: Negative for chest pain, palpitations and leg swelling.   Gastrointestinal: Negative for abdominal distention, abdominal pain, blood in stool, constipation, diarrhea, nausea and vomiting.   Endocrine: Negative for cold intolerance, heat intolerance, polydipsia and polyuria.   Genitourinary: Negative.  Negative for difficulty urinating and frequency.   Musculoskeletal: Positive for arthralgias, gait problem and joint swelling. Negative for back pain and myalgias.   Skin: Negative for color change, pallor, rash and wound.   Neurological: Negative for dizziness, tingling, tremors,  "weakness, light-headedness, numbness and headaches.   Hematological: Negative for adenopathy.   Psychiatric/Behavioral: Negative for behavioral problems, confusion, self-injury, sleep disturbance and suicidal ideas. The patient is not nervous/anxious.      Objective:   /86 (BP Location: Left arm, Patient Position: Sitting, BP Method: Large (Manual))   Pulse 76   Temp 96.8 °F (36 °C) (Tympanic)   Ht 5' 2" (1.575 m)   Wt 111.3 kg (245 lb 6 oz)   SpO2 99%   BMI 44.88 kg/m²     Physical Exam   Constitutional: She is oriented to person, place, and time. She appears well-developed and well-nourished.   HENT:   Head: Normocephalic and atraumatic.   Right Ear: External ear normal.   Left Ear: External ear normal.   Nose: Nose normal.   Mouth/Throat: Oropharynx is clear and moist.   Eyes: Pupils are equal, round, and reactive to light. Conjunctivae and EOM are normal.   Neck: Normal range of motion. Neck supple.   Cardiovascular: Normal rate, regular rhythm and normal heart sounds. Exam reveals no gallop and no friction rub.   No murmur heard.  Pulmonary/Chest: Effort normal and breath sounds normal. No respiratory distress. She has no wheezes. She has no rales. She exhibits no tenderness.   Abdominal: Soft. She exhibits no distension. There is no tenderness.   Musculoskeletal: Normal range of motion.        Left ankle: She exhibits normal range of motion, no swelling, no ecchymosis, no deformity, no laceration and normal pulse. No tenderness. Achilles tendon normal. Achilles tendon exhibits no pain.        Left foot: There is tenderness, bony tenderness and swelling. There is normal range of motion, normal capillary refill, no crepitus, no deformity and no laceration.        Feet:    Lymphadenopathy:     She has no cervical adenopathy.   Neurological: She is alert and oriented to person, place, and time.   Skin: Skin is warm and dry.   Psychiatric: She has a normal mood and affect. Her behavior is normal. " Judgment and thought content normal.   Vitals reviewed.    X-Ray Foot Complete 3 view Left  Narrative: EXAMINATION:  XR FOOT COMPLETE 3 VIEW LEFT    CLINICAL HISTORY:  rule out fracture of 5th metatarsal/cuboid;.  Pain in left foot    TECHNIQUE:  AP, lateral and oblique views of the left foot were performed.    COMPARISON:  05/14/2020    FINDINGS:  There is no radiographic evidence of acute osseous, articular, or soft tissue abnormality.  Joint spaces are well preserved.  No erosive changes demonstrated.  Impression: No acute findings    Electronically signed by: Jefferson Lennon MD  Date:    05/15/2020  Time:    09:21    Assessment:     1. Left foot pain    2. Peroneal tendinitis of left lower extremity      Plan:   Left foot pain  -     X-Ray Foot Complete 3 view Left; Future; Expected date: 05/15/2020  -     POST-SURGICAL BOOT/SHOE FOR HOME USE    Peroneal tendinitis of left lower extremity  -     POST-SURGICAL BOOT/SHOE FOR HOME USE    -RICE  -walking boot  -activity as tolerated  -2 aleve in the morning and 2 aleve at night for the next week  Educational handout on over-the-counter medications and at-home conservative care, pertinent to the patients diagnosis today, was handed to the patient and discussed in detail.    Follow up if symptoms worsen or fail to improve.

## 2020-05-18 ENCOUNTER — PATIENT MESSAGE (OUTPATIENT)
Dept: ALLERGY | Facility: CLINIC | Age: 25
End: 2020-05-18

## 2020-05-18 LAB
CD3+CD4+ CELLS # BLD: 670 CELLS/UL (ref 300–1400)
CD3+CD4+ CELLS NFR BLD: 41.6 % (ref 28–57)
DIPHTHERIA IGG VALUE: 0.8 IU/ML
DIPHTHERIA TOXOID IGG ANTIBODY: POSITIVE
IGG1 SER-MCNC: 790 MG/DL (ref 382–929)
IGG2 SER-MCNC: 230 MG/DL (ref 242–700)
IGG3 SER-MCNC: 67 MG/DL (ref 22–176)
IGG4 SER-MCNC: 44 MG/DL (ref 4–86)
LYMPHOCYTES NFR CSF MANUAL: 1.59 % (ref 0.9–3.6)
LYMPHOCYTES NFR CSF MANUAL: 108 CELLS/UL (ref 90–600)
LYMPHOCYTES NFR CSF MANUAL: 1128 CELLS/UL (ref 700–2100)
LYMPHOCYTES NFR CSF MANUAL: 20.9 % (ref 6–19)
LYMPHOCYTES NFR CSF MANUAL: 26.1 % (ref 10–39)
LYMPHOCYTES NFR CSF MANUAL: 329 CELLS/UL (ref 100–500)
LYMPHOCYTES NFR CSF MANUAL: 421 CELLS/UL (ref 200–900)
LYMPHOCYTES NFR CSF MANUAL: 6.8 % (ref 7–31)
LYMPHOCYTES NFR CSF MANUAL: 70.1 % (ref 55–83)
TETANUS TOXOID IGG AB: POSITIVE
TETANUS TOXOID IGG VALUE: 1.67 IU/ML

## 2020-05-21 ENCOUNTER — TELEPHONE (OUTPATIENT)
Dept: ALLERGY | Facility: CLINIC | Age: 25
End: 2020-05-21

## 2020-05-21 NOTE — TELEPHONE ENCOUNTER
I called Isidro at [phone] and told him  is out and it would be Monday before we can fax back form on [employer].    --- Message from Susan So sent at 5/21/2020  4:19 PM CDT -----  Contact: holly khalil  needs call back regarding status of 5/15 [employer] request....[phone]

## 2020-05-28 ENCOUNTER — PATIENT MESSAGE (OUTPATIENT)
Dept: ALLERGY | Facility: CLINIC | Age: 25
End: 2020-05-28

## 2020-05-28 LAB
DEPRECATED S PNEUM 1 IGG SER-MCNC: 0.7 MCG/ML
DEPRECATED S PNEUM12 IGG SER-MCNC: 0.4 MCG/ML
DEPRECATED S PNEUM14 IGG SER-MCNC: 2.8 MCG/ML
DEPRECATED S PNEUM19 IGG SER-MCNC: 3.5 MCG/ML
DEPRECATED S PNEUM23 IGG SER-MCNC: 0.3 MCG/ML
DEPRECATED S PNEUM3 IGG SER-MCNC: <0.3 MCG/ML
DEPRECATED S PNEUM4 IGG SER-MCNC: 0.5 MCG/ML
DEPRECATED S PNEUM5 IGG SER-MCNC: 2.7 MCG/ML
DEPRECATED S PNEUM8 IGG SER-MCNC: 0.6 MCG/ML
DEPRECATED S PNEUM9 IGG SER-MCNC: 2.5 MCG/ML
S PNEUM DA 18C IGG SER-MCNC: <0.3 MCG/ML
S PNEUM DA 6B IGG SER-MCNC: <0.3 MCG/ML
S PNEUM DA 7F IGG SER-MCNC: 1.9 MCG/ML
S PNEUM DA 9V IGG SER-MCNC: 0.3 MCG/ML

## 2020-06-01 LAB — HAEM INFLU B IGG SER-MCNC: 0.38 MCG/ML

## 2020-06-09 ENCOUNTER — OFFICE VISIT (OUTPATIENT)
Dept: INTERNAL MEDICINE | Facility: CLINIC | Age: 25
End: 2020-06-09
Payer: COMMERCIAL

## 2020-06-09 VITALS
OXYGEN SATURATION: 96 % | DIASTOLIC BLOOD PRESSURE: 80 MMHG | HEART RATE: 79 BPM | BODY MASS INDEX: 44.99 KG/M2 | TEMPERATURE: 99 F | SYSTOLIC BLOOD PRESSURE: 126 MMHG | HEIGHT: 62 IN | WEIGHT: 244.5 LBS

## 2020-06-09 DIAGNOSIS — K58.9 IRRITABLE BOWEL SYNDROME, UNSPECIFIED TYPE: Primary | ICD-10-CM

## 2020-06-09 DIAGNOSIS — K21.9 GASTROESOPHAGEAL REFLUX DISEASE, ESOPHAGITIS PRESENCE NOT SPECIFIED: ICD-10-CM

## 2020-06-09 DIAGNOSIS — F41.9 ANXIETY: ICD-10-CM

## 2020-06-09 PROBLEM — R11.0 NAUSEA: Status: RESOLVED | Noted: 2020-02-04 | Resolved: 2020-06-09

## 2020-06-09 PROCEDURE — 99214 OFFICE O/P EST MOD 30 MIN: CPT | Mod: S$GLB,,, | Performed by: NURSE PRACTITIONER

## 2020-06-09 PROCEDURE — 99214 PR OFFICE/OUTPT VISIT, EST, LEVL IV, 30-39 MIN: ICD-10-PCS | Mod: S$GLB,,, | Performed by: NURSE PRACTITIONER

## 2020-06-09 PROCEDURE — 3008F PR BODY MASS INDEX (BMI) DOCUMENTED: ICD-10-PCS | Mod: CPTII,S$GLB,, | Performed by: NURSE PRACTITIONER

## 2020-06-09 PROCEDURE — 99999 PR PBB SHADOW E&M-EST. PATIENT-LVL IV: ICD-10-PCS | Mod: PBBFAC,,, | Performed by: NURSE PRACTITIONER

## 2020-06-09 PROCEDURE — 3008F BODY MASS INDEX DOCD: CPT | Mod: CPTII,S$GLB,, | Performed by: NURSE PRACTITIONER

## 2020-06-09 PROCEDURE — 99999 PR PBB SHADOW E&M-EST. PATIENT-LVL IV: CPT | Mod: PBBFAC,,, | Performed by: NURSE PRACTITIONER

## 2020-06-09 RX ORDER — SERTRALINE HYDROCHLORIDE 25 MG/1
25 TABLET, FILM COATED ORAL DAILY
Qty: 30 TABLET | Refills: 0 | Status: SHIPPED | OUTPATIENT
Start: 2020-06-09 | End: 2020-07-14 | Stop reason: SDUPTHER

## 2020-06-09 RX ORDER — OMEPRAZOLE 20 MG/1
20 CAPSULE, DELAYED RELEASE ORAL DAILY
COMMUNITY
End: 2020-07-31

## 2020-06-09 RX ORDER — CHOLECALCIFEROL (VITAMIN D3) 25 MCG
1000 TABLET ORAL DAILY
COMMUNITY

## 2020-06-09 NOTE — PROGRESS NOTES
Isaías Rush 24 y.o. 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   HENT: Negative for hearing loss, rhinorrhea and trouble swallowing.    Eyes: Negative for discharge and visual disturbance.   Respiratory: Negative for chest tightness and wheezing.    Cardiovascular: Negative for chest pain and palpitations.   Gastrointestinal: Positive for diarrhea. Negative for blood in stool, constipation and vomiting.   Endocrine: Negative for polydipsia and polyuria.   Genitourinary: Negative for difficulty urinating, dysuria, hematuria and menstrual problem.   Musculoskeletal: Negative for arthralgias, joint swelling and neck pain.   Neurological: Negative for weakness and headaches.   Psychiatric/Behavioral: Negative for confusion and dysphoric mood.     Physical Exam  Vitals signs and nursing note reviewed.   Constitutional:       General: She is not in acute distress.     Appearance: She is well-developed. She is obese. She is not ill-appearing, toxic-appearing or diaphoretic.   HENT:      Head: Normocephalic and atraumatic.      Right Ear: External ear normal.      Left Ear: External ear normal.   Eyes:      General: No  scleral icterus.        Right eye: No discharge.         Left eye: No discharge.      Conjunctiva/sclera: Conjunctivae normal.      Pupils: Pupils are equal, round, and reactive to light.   Neck:      Musculoskeletal: Normal range of motion.      Trachea: No tracheal deviation.   Cardiovascular:      Rate and Rhythm: Normal rate and regular rhythm.   Pulmonary:      Effort: Pulmonary effort is normal. No respiratory distress.      Breath sounds: Normal breath sounds. No stridor. No wheezing, rhonchi or rales.   Chest:      Chest wall: No tenderness.   Abdominal:      General: Bowel sounds are normal. There is no distension.      Palpations: Abdomen is soft. There is no mass.      Tenderness: There is no abdominal tenderness. There is no right CVA tenderness, left CVA tenderness, guarding or rebound.      Hernia: No hernia is present.   Musculoskeletal: Normal range of motion.   Skin:     General: Skin is warm and dry.      Coloration: Skin is not jaundiced or pale.      Findings: No erythema or rash.   Neurological:      Mental Status: She is alert and oriented to person, place, and time.   Psychiatric:         Attention and Perception: Attention and perception normal. She is attentive. She does not perceive auditory or visual hallucinations.         Mood and Affect: Mood normal. Affect is tearful.         Speech: Speech normal.         Behavior: Behavior normal. Behavior is cooperative.         Thought Content: Thought content normal.         Cognition and Memory: Cognition and memory normal.         Judgment: Judgment normal.         Most Recent Laboratory Results Reviewed ({Yes)  Lab Results   Component Value Date    WBC 4.11 05/15/2020    HGB 13.1 05/15/2020    HCT 41.5 05/15/2020     05/15/2020    CHOL 177 02/29/2020    TRIG 88 02/29/2020    HDL 43 02/29/2020    ALT 40 02/29/2020    AST 34 02/29/2020     02/29/2020    K 4.2 02/29/2020     02/29/2020    CREATININE 0.7 02/29/2020    BUN 7  02/29/2020    CO2 26 02/29/2020    TSH 0.719 02/29/2020       Assessment     ICD-10-CM ICD-9-CM   1. Irritable bowel syndrome, unspecified type  K58.9 564.1   2. Anxiety  F41.9 300.00   3. Gastroesophageal reflux disease, esophagitis presence not specified  K21.9 530.81        Plan   Irritable bowel syndrome, unspecified type  -     Ambulatory referral/consult to Physical/Occupational Therapy; Future; Expected date: 06/16/2020    Anxiety  -     Ambulatory referral/consult to Psychiatry; Future; Expected date: 06/16/2020  -     sertraline (ZOLOFT) 25 MG tablet; Take 1 tablet (25 mg total) by mouth once daily.  Dispense: 30 tablet; Refill: 0    Gastroesophageal reflux disease, esophagitis presence not specified  Reports switched from Protonix to prilosec, working well              Follow up in about 1 month (around 7/9/2020), or if symptoms worsen or fail to improve, for med check.  Future Appointments     Date Provider Specialty Appt Notes    6/23/2020 Ale Weinberg MD Obstetrics and Gynecology New Pt establishing care    6/25/2020 Alena Santoro, [facility], Irritable bowel syndrome, unspecified type    6/26/2020 Yessica Rosario, YAIR Internal Medicine Follow up    7/8/2020 Chilo You, PhD Psychiatry EAP#  (#1)    7/22/2020 Chilo You, PhD Psychiatry EAP#  (#2)    8/24/2020 Junito Del Valle MD Internal Medicine 6 month f/u

## 2020-06-22 ENCOUNTER — PATIENT OUTREACH (OUTPATIENT)
Dept: ADMINISTRATIVE | Facility: OTHER | Age: 25
End: 2020-06-22

## 2020-06-22 NOTE — PROGRESS NOTES
Chart reviewed.   Immunizations: Triggered Imm Registry     Orders placed: n/a  Upcoming appts to satisfy STEPHANIE topics: n/a

## 2020-06-23 ENCOUNTER — OFFICE VISIT (OUTPATIENT)
Dept: OBSTETRICS AND GYNECOLOGY | Facility: CLINIC | Age: 25
End: 2020-06-23
Payer: COMMERCIAL

## 2020-06-23 VITALS
SYSTOLIC BLOOD PRESSURE: 118 MMHG | WEIGHT: 248.25 LBS | DIASTOLIC BLOOD PRESSURE: 80 MMHG | HEIGHT: 62 IN | BODY MASS INDEX: 45.68 KG/M2

## 2020-06-23 DIAGNOSIS — Z01.419 WELL WOMAN EXAM WITH ROUTINE GYNECOLOGICAL EXAM: Primary | ICD-10-CM

## 2020-06-23 DIAGNOSIS — Z12.4 CERVICAL CANCER SCREENING: ICD-10-CM

## 2020-06-23 DIAGNOSIS — Z31.69 PRE-CONCEPTION COUNSELING: ICD-10-CM

## 2020-06-23 PROCEDURE — 99999 PR PBB SHADOW E&M-EST. PATIENT-LVL III: CPT | Mod: PBBFAC,,, | Performed by: OBSTETRICS & GYNECOLOGY

## 2020-06-23 PROCEDURE — 99385 PREV VISIT NEW AGE 18-39: CPT | Mod: S$GLB,,, | Performed by: OBSTETRICS & GYNECOLOGY

## 2020-06-23 PROCEDURE — 99999 PR PBB SHADOW E&M-EST. PATIENT-LVL III: ICD-10-PCS | Mod: PBBFAC,,, | Performed by: OBSTETRICS & GYNECOLOGY

## 2020-06-23 PROCEDURE — 88175 CYTOPATH C/V AUTO FLUID REDO: CPT

## 2020-06-23 PROCEDURE — 99385 PR PREVENTIVE VISIT,NEW,18-39: ICD-10-PCS | Mod: S$GLB,,, | Performed by: OBSTETRICS & GYNECOLOGY

## 2020-06-23 NOTE — PATIENT INSTRUCTIONS
Improving Your Fertility  Your doctor may suggest some simple methods to help you get pregnant. Most focus on predicting ovulation. This is the time when sex has the best chance of success. Your doctor may also advise working on other factors that can affect fertility.  Predicting ovulation  Conception is only possible when a woman is ovulating. One signal of ovulation is a surge in the hormone LH. Other signals include changes in body temperature and changes in cervical mucus.  Ovulation predictor kits  One of the best signals of ovulation is a sudden increase in the hormone LH. A simple urine test called an ovulation predictor kit is available at most [facility]. It can help pinpoint this surge. Have sex the day you notice the surge. Keep having sex daily over the next 3 days, or as often as your doctor suggests.  Body temperature changes  A womans resting temperature or basal body temperature increases after ovulation.  By this point in the cycle, having sex will not increase your chances of getting pregnant. However, it is an indication that ovulation occurred.   Cervical mucus changes  Shortly before ovulation a womans cervical mucus gets clear and stretchy. The mucus also increases in quantity. This makes it easier for sperm to travel through the cervix. Not all women notice these changes. But if you do, begin having sex daily until the mucus thickens again.  Working on other factors that affect fertility  Certain lifestyle and health habits can affect fertility. Be sure to talk with your doctor about these issues. You may be able to make some simple changes to improve your chances of conception. Keep in mind, though, that it can take time for healthy changes to improve your fertility.  Weight problems  Being overweight or underweight can affect hormone levels. In women, this can impair ovulation. In men, obesity can decrease sperm count.  Medications, supplements, and herbal remedies  Medications,  supplements, and herbal remedies can affect hormone levels in men and women. They can also affect the quantity and quality of sperm. Be sure to tell your doctor about any substances you take.  Sexually transmitted diseases  Sexually transmitted diseases (STDs) can scar the reproductive organs in both men and women. If youve had an STD, be sure to tell your doctor.  Testicular heat  A mans testicles are normally a few degrees cooler than the rest of his body. When the testicles are too warm, sperm production may decline. Try to avoid excess heat from things like hot tubs and saunas.  Smoking, alcohol, and drugs  Smoking can affect estrogen levels and decrease egg production in women. Men who smoke may have lowered sperm count. Excessive alcohol or drug use can also decrease fertility in both men and women.   Chemical exposure  Certain chemicals can affect hormone levels. Talk with your doctor if youre regularly exposed to strong chemicals. You should also ask about lubricants used during sex. Some types can be toxic to sperm.  Other factors  Excessive exercise can decrease hormone production. It can also cause irregular menstruation in women. Ongoing stress is another factor that may affect fertility and cause ovulation problems.  Date Last Reviewed: 5/21/2015  © 8190-1816 [employer]. [address] [place] [address], [place] [address]. All rights reserved. This information is not intended as a substitute for professional medical care. Always follow your healthcare professional's instructions.

## 2020-06-23 NOTE — PROGRESS NOTES
Subjective:       Patient ID: Isaías Rush is a 24 y.o. female.    Chief Complaint:  Well Woman      History of Present Illness  HPI  Presents for well-woman exam and preconception counseling.  She has no complaints.  Pt has regular, monthly periods.  She and her  are ready to try to conceive.  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    Last pap: 5/2019 in [place], normal; wants pap today so it is up to date with [facility]    GYN & OB History  Patient's last menstrual period was 06/05/2020.   Date of Last Pap: No result found    OB History   No obstetric history on file.       Review of Systems  Review of Systems   Constitutional: Negative for activity change, fatigue, fever and unexpected weight change.   Gastrointestinal: Negative for abdominal pain, bloating, constipation, diarrhea, nausea and vomiting.   Endocrine: Negative for hair loss and hot flashes.   Genitourinary: Negative for dysmenorrhea, dyspareunia, dysuria, frequency, genital sores, hematuria, menorrhagia, menstrual problem, pelvic pain, urgency, vaginal bleeding, vaginal discharge, vaginal pain, postcoital bleeding and vaginal odor.   Integumentary:  Negative for rash, hair changes, breast mass, nipple discharge and breast skin changes.   Hematological: Negative for adenopathy.   Breast: Negative for mass, mastodynia, nipple discharge and skin changes          Objective:    Physical Exam:   Constitutional: She is oriented to person, place, and time. She appears well-developed and well-nourished. No distress.      Neck: Neck supple. No thyromegaly present.     Pulmonary/Chest: Right breast exhibits no inverted nipple, no mass, no nipple discharge, no skin change, no tenderness, no bleeding and no swelling. Left breast exhibits no inverted nipple, no mass, no nipple discharge, no skin change, no tenderness, no bleeding and no swelling. Breasts are symmetrical.        Abdominal: Soft. She exhibits no distension and no mass. There is no abdominal  tenderness. There is no rebound and no guarding.     Genitourinary:    Pelvic exam was performed with patient supine.   There is no rash, tenderness, lesion or injury on the right labia. There is no rash, tenderness, lesion or injury on the left labia. Uterus is not deviated, not enlarged, not fixed, not tender, not hosting fibroids and not experiencing uterine prolapse. Cervix is normal. Right adnexum displays no mass, no tenderness and no fullness. Left adnexum displays no mass, no tenderness and no fullness. No erythema, tenderness, bleeding, rectocele or cystocele in the vagina.    No foreign body in the vagina.      No signs of injury in the vagina.   Cervix exhibits no motion tenderness, no discharge and no friability.        Uterus Size: 6 cm       Neurological: She is alert and oriented to person, place, and time.     Psychiatric: She has a normal mood and affect.          Assessment:        1. Well woman exam with routine gynecological exam    2. Cervical cancer screening    3. Pre-conception counseling                Plan:      Isaías was seen today for well woman.    Diagnoses and all orders for this visit:    Well woman exam with routine gynecological exam    Cervical cancer screening  -     Liquid-Based Pap Smear, Screening    Pre-conception counseling    Reviewed updated recommendations for pap smears (every 3 years) in low risk patients.   Recommend annual pelvic exams.  Reviewed recommendations for annual CBE.  Patient was counseled to start folic acid supplementation 400mcg po q day.  Limit alcohol intake.  Continue well-balanced diet and exercise.  Discussed importance of achieving some weight loss, as obesity places her at increased risk for several adverse pregnancy outcomes.  Counseled on timed intercourse within 24-48 hours of LH surge.  Advised to notify us once she conceives.  Needs infertility evaluation if does not conceive within 1 year.  RTC 1 year or sooner prn.

## 2020-06-25 ENCOUNTER — CLINICAL SUPPORT (OUTPATIENT)
Dept: REHABILITATION | Facility: HOSPITAL | Age: 25
End: 2020-06-25
Payer: COMMERCIAL

## 2020-06-25 DIAGNOSIS — K58.9 IRRITABLE BOWEL SYNDROME, UNSPECIFIED TYPE: ICD-10-CM

## 2020-06-25 PROCEDURE — 97535 SELF CARE MNGMENT TRAINING: CPT | Performed by: PHYSICAL THERAPIST

## 2020-06-25 PROCEDURE — 97161 PT EVAL LOW COMPLEX 20 MIN: CPT | Performed by: PHYSICAL THERAPIST

## 2020-06-25 PROCEDURE — 97112 NEUROMUSCULAR REEDUCATION: CPT | Performed by: PHYSICAL THERAPIST

## 2020-06-25 NOTE — PATIENT INSTRUCTIONS
Elimination diet for 30 days to find triggers for bowel irritation  Stress management with diaphragmatic breathing daily 20 min

## 2020-06-25 NOTE — PLAN OF CARE
[facility]  Pelvic Health Physical Therapy Initial Evaluation    Date: 6/25/2020   Name: Isaías Rush  Clinic Number: [id number]  Therapy Diagnosis: No diagnosis found.  Physician: Yessica Rosario NP    Physician Orders: PT Eval and Treat   Medical Diagnosis from Referral: Irritable bowel syndrome  Evaluation Date: 6/25/2020  Authorization Period Expiration: 6/24/20-12/31/20  Plan of Care Expiration: 12/31/20  Visit # / Visits authorized: 1/20    Time In: 300  Time Out: 400  Total Appointment Time (timed & untimed codes): 55 minutes    Precautions: universal    Subjective     Date of onset: bowel issues began about 3 years ago    History of current condition - Isaías reports: 3 year history of bowel issues that recently have worsened since increased stress from COVID, living with mother, recent marriage    OB/GYN History: painful periods and pelvic pain  Sexually active? Yes  Pain with vaginal exams, intercourse or tampon use? No    Bladder/Bowel History: trouble holding back gas/feces and straining or pushing to empty bladder   Frequency of urination:   Daytime: 2-3x daily- in surgeries during day that are 4 plus hours long at times           Nighttime: 2-3 hours   Difficulty initiating urine stream: No   Urine stream: strong   Complete emptying: Yes   Bladder leakage: No   Frequency of incidents: 0   Amount leaked (urine): 0   Urinary Urgency: Yes, Able to delay the urge for at least 0 minute(s).   Frequency of bowel movements: 3-4 times a day   Difficulty initiating BM: No   Quality/Shape of BM: 4-7   Does Patient Feel Empty after BM? Yes   Fiber Supplements or Laxative Use? No   Colon leakage: Yes   Frequency of incidents: 2-3    Amount leaked (bowels): full movement   Form of protection: none   Number of pads required in 24 hours: carries extra underwear    Pain:  Location: abdomen  Current 5/10, worst 6/10, best 0/10   Description: Sharp  Aggravating  Factors/Activities that cause symptoms: ?   Easing Factors: nothing     Medical History: Isaías  has a past medical history of Anxiety, Asthma, Dysfunctional gallbladder, Gastritis, GERD (gastroesophageal reflux disease), Gluten intolerance, and Lactose intolerance.     Surgical History: Isaías Rush  has a past surgical history that includes Gallbladder surgery (Bilateral, 09/2018); Wrist Arthroplasty (Bilateral, 2009); Cholecystectomy; Esophagogastroduodenoscopy (N/A, 2/5/2020); and Colonoscopy (12/2018).    Medications: Isaías has a current medication list which includes the following prescription(s): amitriptyline, epinephrine, montelukast, omeprazole, sertraline, and vitamin d.    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  Prior Level of Function:   Current Level of Function: stomach aches and diahhrea    Types of fluid intake: water and soda- coca cola  Diet: eggs and grapes, meat and veggie  Habitus: overweight  Abuse/Neglect: No     Pts goals: reduce leaking and frequent BM    OBJECTIVE     See EMR under MEDIA for written consent provided 6/25/2020  Chaperone: declined    ORTHO SCREEN  Posture in sitting: slouched   Posture in standing: WNL  Pelvic alignment:      ABDOMINAL WALL ASSESSMENT  Palpation: tender to RUQ  Abdominal strength: Rectus abdominus:      Transverse abdominus:   Scarring: none  Pelvic Floor Muscle and Transverse Abdominus Synergy:   Diastasis:      BREATHING MECHANICS ASSESSMENT   Thorax Assessment During Quiet Respiration: Decreased excursion of abdominal wall  and Decreased excursion bilaterally of lateral ribs   Thorax Assessment During  Deep Respiration: Decreased excursion of abdominal wall , Decreased excursion bilaterally of lateral ribs  and paradoxical breath intitially but after VC, patient demo improved breathing pattern      Limitation/Restriction for FOTO PFD Ibowel Survey    Therapist reviewed FOTO scores for Isaías Rush on 6/25/2020.   FOTO documents entered into [employer] - see Media section.    Limitation Score: 21%       TREATMENT     Treatment Time In: 340  Treatment Time Out: 400  Total Treatment time (time-based codes) separate from Evaluation: 20 minutes    Neuromuscular Re-education to develop Coordination for 10 minutes including:   diaphragmatic breathing to decrease sympathetic tone for reduction in anxiety to improve IBS      Self-Care for 10 minutes including:   Elimination diet for food allergies as cause of IBS    Home Exercises provided:  Written Home Exercises provided: yes.  Exercises were reviewed and Isaías was able to demonstrate them prior to the end of the session.    Isaías demonstrated good  understanding of the education provided.     See EMR under Patient Instructions for exercises provided 6/25/2020.    Assessment     Isaías is a 24 y.o. female referred to outpatient Physical Therapy with a medical diagnosis of irritable bowel Syndrome. Pt presents with altered posture, increased nocturia, poor coordination of pelvic floor muscles during ADL's leading to urinary or fecal leakage, poor fluid intake, poor diet and dysfunctional voiding.      Pt prognosis is Good.   Pt will benefit from skilled outpatient Physical Therapy to address the deficits stated above and in the chart below, provide pt/family education, and to maximize pt's level of independence.     Plan of care discussed with patient: Yes  Pt's spiritual, cultural and educational needs considered and patient is agreeable to the plan of care and goals as stated below:     Anticipated Barriers for therapy: none    Medical Necessity is demonstrated by the  following:    History  Co-morbidities and personal factors that may impact the plan of care Co-morbidities   anxiety, consumption of bladder irritants, COPD/asthma, coping style/mechanism, depression, difficulty sleeping, high BMI and young age    Personal Factors  coping style  lifestyle     moderate   Examination  Body structures and functions, activity limitations and participation restrictions that may impact the plan of care Body Regions/Systems/Functions:  dysfunctional voiding, dysfunctional defecation and dysfunctional breathing pattern     Activity Limitations:  delaying urge to urinate, sleep uninterrupted by excessive nocturia, incontinence with ADLs and bathroom mapping    Participation Restrictions:  all ADLs/iADLs uninterrupted by fecal incontinence/urgency/frequency, social activities with friends/family, work duties and Sleep restrictions    Activity limitations:   Learning and applying knowledge  no deficits    General Tasks and Commands  no deficits    Communication  no deficits    Mobility  no deficits    Self care  toileting  no deficits    Domestic Life  no deficits    Interactions/Relationships  no deficits    Life Areas  no deficits    Community and Social Life  no deficits       low   Clinical Presentation stable and uncomplicated low   Decision Making/ Complexity Score: low       Goals:   Short Term Goals: 1-2 weeks   I with HEP- elimination diet and deep breathing daily 20 minutes  Pt reports frequency of bowels  From 3-4x daily to 1-2  Pt reports more normal consistency of stool 4-5 that is controlled  Long Term Goals: 3-4 weeks   Pt report type 4 BM daily or every other day  Pt deny fecal urgency and leaking after eating  Pt report improved management of stress and anxiety    Plan     Plan of care Certification: 6/25/2020 to 12/31/20    Outpatient Physical Therapy 1 times weekly for 3 weeks to include the following interventions: neuromuscular re-education, patient/family education and  self care/home management    Alena Santoro, PT

## 2020-06-29 ENCOUNTER — OFFICE VISIT (OUTPATIENT)
Dept: INTERNAL MEDICINE | Facility: CLINIC | Age: 25
End: 2020-06-29
Payer: COMMERCIAL

## 2020-06-29 VITALS
WEIGHT: 244.81 LBS | SYSTOLIC BLOOD PRESSURE: 130 MMHG | HEART RATE: 85 BPM | BODY MASS INDEX: 44.78 KG/M2 | DIASTOLIC BLOOD PRESSURE: 80 MMHG | OXYGEN SATURATION: 98 % | TEMPERATURE: 98 F

## 2020-06-29 DIAGNOSIS — F41.9 ANXIETY: Primary | ICD-10-CM

## 2020-06-29 DIAGNOSIS — K58.9 IRRITABLE BOWEL SYNDROME, UNSPECIFIED TYPE: ICD-10-CM

## 2020-06-29 LAB
FINAL PATHOLOGIC DIAGNOSIS: NORMAL
Lab: NORMAL

## 2020-06-29 PROCEDURE — 99999 PR PBB SHADOW E&M-EST. PATIENT-LVL III: ICD-10-PCS | Mod: PBBFAC,,, | Performed by: NURSE PRACTITIONER

## 2020-06-29 PROCEDURE — 3008F BODY MASS INDEX DOCD: CPT | Mod: CPTII,S$GLB,, | Performed by: NURSE PRACTITIONER

## 2020-06-29 PROCEDURE — 99999 PR PBB SHADOW E&M-EST. PATIENT-LVL III: CPT | Mod: PBBFAC,,, | Performed by: NURSE PRACTITIONER

## 2020-06-29 PROCEDURE — 99214 OFFICE O/P EST MOD 30 MIN: CPT | Mod: S$GLB,,, | Performed by: NURSE PRACTITIONER

## 2020-06-29 PROCEDURE — 99214 PR OFFICE/OUTPT VISIT, EST, LEVL IV, 30-39 MIN: ICD-10-PCS | Mod: S$GLB,,, | Performed by: NURSE PRACTITIONER

## 2020-06-29 PROCEDURE — 3008F PR BODY MASS INDEX (BMI) DOCUMENTED: ICD-10-PCS | Mod: CPTII,S$GLB,, | Performed by: NURSE PRACTITIONER

## 2020-06-29 NOTE — PROGRESS NOTES
Isaías Rush 24 y.o. female     Chief Complaint:  Chief Complaint   Patient presents with    Follow-up       History of Present Illness:  Pt is known to provider and presents for:       Anxiety and IBS f/u   Started on zoloft 25mg at LOV 6/9/20   No side effects     referred to pelvic floor PT (intake complete, pending further eval)   Pending psych eval     anxiety & bowels a little better   moving into new home and out of mothers in next week/two   Able to keep composure better at work   Working on diet improvement   Wanting diet recommendations     Exam:  Review of Systems   Constitutional: Negative for chills, diaphoresis, fatigue and unexpected weight change.   HENT: Negative for trouble swallowing.    Eyes: Negative for discharge and visual disturbance.   Respiratory: Negative for shortness of breath.    Cardiovascular: Negative for chest pain and leg swelling.   Gastrointestinal: Negative for abdominal distention, abdominal pain, diarrhea, nausea and vomiting.        Bowel urgency    Genitourinary: Negative for difficulty urinating.   Musculoskeletal: Negative for gait problem.   Skin: Negative for wound.   Neurological: Negative for speech difficulty.   Psychiatric/Behavioral: Negative for confusion. The patient is nervous/anxious.      Physical Exam  Vitals signs and nursing note reviewed.   Constitutional:       General: She is not in acute distress.     Appearance: Normal appearance. She is well-developed. She is obese. She is not ill-appearing, toxic-appearing or diaphoretic.   HENT:      Head: Normocephalic and atraumatic.      Right Ear: External ear normal.      Left Ear: External ear normal.   Eyes:      General: No scleral icterus.        Right eye: No discharge.         Left eye: No discharge.      Conjunctiva/sclera: Conjunctivae normal.      Pupils: Pupils are equal, round, and reactive to light.   Neck:      Musculoskeletal: Normal range of motion.      Trachea: No tracheal deviation.  "  Cardiovascular:      Rate and Rhythm: Normal rate.   Pulmonary:      Effort: Pulmonary effort is normal. No respiratory distress.   Abdominal:      General: There is no distension.   Musculoskeletal: Normal range of motion.   Skin:     General: Skin is warm and dry.      Coloration: Skin is not jaundiced or pale.      Findings: No bruising, erythema, lesion or rash.   Neurological:      Mental Status: She is alert and oriented to person, place, and time.   Psychiatric:         Speech: Speech normal.         Behavior: Behavior normal. Behavior is cooperative.         Thought Content: Thought content normal.         Judgment: Judgment normal.         Most Recent Laboratory Results Reviewed ({Yes)  Lab Results   Component Value Date    WBC 4.11 05/15/2020    HGB 13.1 05/15/2020    HCT 41.5 05/15/2020     05/15/2020    CHOL 177 02/29/2020    TRIG 88 02/29/2020    HDL 43 02/29/2020    ALT 40 02/29/2020    AST 34 02/29/2020     02/29/2020    K 4.2 02/29/2020     02/29/2020    CREATININE 0.7 02/29/2020    BUN 7 02/29/2020    CO2 26 02/29/2020    TSH 0.719 02/29/2020       Assessment     ICD-10-CM ICD-9-CM   1. Anxiety  F41.9 300.00   2. Irritable bowel syndrome, unspecified type  K58.9 564.1        Plan   Anxiety  Continue zoloft, notify if feels need dose increase   Psych eval pending     Irritable bowel syndrome, unspecified type  Encouraged reduced intake of refined/processed foods including breads, cereals, muffins, pasta, cookies, sodas, candy and other packaged foods    Suggested reading "It Starts with Food" by [employer]          Follow up for Next routine follow up appointment.    Future Appointments     Date Provider Specialty Appt Notes    7/16/2020 Gladis Kruger LCSW Psychiatry EAP#  (#1)    7/23/2020 Alena Santoro PT Outpatient Rehab Specialty Services Required    7/30/2020 Gladis Kruger LCSW Psychiatry EAP#  (#2)    8/24/2020 Junito Del Valle MD Internal Medicine 6 " month f/u

## 2020-07-14 DIAGNOSIS — F41.9 ANXIETY: ICD-10-CM

## 2020-07-14 RX ORDER — SERTRALINE HYDROCHLORIDE 25 MG/1
25 TABLET, FILM COATED ORAL DAILY
Qty: 30 TABLET | Refills: 0 | Status: SHIPPED | OUTPATIENT
Start: 2020-07-14 | End: 2020-07-31

## 2020-07-21 ENCOUNTER — OFFICE VISIT (OUTPATIENT)
Dept: URGENT CARE | Facility: CLINIC | Age: 25
End: 2020-07-21
Payer: COMMERCIAL

## 2020-07-21 VITALS — HEART RATE: 80 BPM | OXYGEN SATURATION: 98 % | TEMPERATURE: 99 F | RESPIRATION RATE: 18 BRPM

## 2020-07-21 DIAGNOSIS — R06.02 SHORTNESS OF BREATH: ICD-10-CM

## 2020-07-21 DIAGNOSIS — J45.909 ASTHMA, UNSPECIFIED ASTHMA SEVERITY, UNSPECIFIED WHETHER COMPLICATED, UNSPECIFIED WHETHER PERSISTENT: ICD-10-CM

## 2020-07-21 DIAGNOSIS — R07.89 FEELING OF CHEST TIGHTNESS: Primary | ICD-10-CM

## 2020-07-21 DIAGNOSIS — R11.0 NAUSEA: ICD-10-CM

## 2020-07-21 LAB
CTP QC/QA: YES
FLUAV AG NPH QL: NEGATIVE
FLUBV AG NPH QL: NEGATIVE

## 2020-07-21 PROCEDURE — 99214 OFFICE O/P EST MOD 30 MIN: CPT | Mod: 25,S$GLB,, | Performed by: PHYSICIAN ASSISTANT

## 2020-07-21 PROCEDURE — U0003 INFECTIOUS AGENT DETECTION BY NUCLEIC ACID (DNA OR RNA); SEVERE ACUTE RESPIRATORY SYNDROME CORONAVIRUS 2 (SARS-COV-2) (CORONAVIRUS DISEASE [COVID-19]), AMPLIFIED PROBE TECHNIQUE, MAKING USE OF HIGH THROUGHPUT TECHNOLOGIES AS DESCRIBED BY CMS-2020-01-R: HCPCS

## 2020-07-21 PROCEDURE — 87804 POCT INFLUENZA A/B: ICD-10-PCS | Mod: QW,S$GLB,, | Performed by: PHYSICIAN ASSISTANT

## 2020-07-21 PROCEDURE — 99214 PR OFFICE/OUTPT VISIT, EST, LEVL IV, 30-39 MIN: ICD-10-PCS | Mod: 25,S$GLB,, | Performed by: PHYSICIAN ASSISTANT

## 2020-07-21 PROCEDURE — 87804 INFLUENZA ASSAY W/OPTIC: CPT | Mod: QW,S$GLB,, | Performed by: PHYSICIAN ASSISTANT

## 2020-07-21 RX ORDER — ONDANSETRON 8 MG/1
8 TABLET, ORALLY DISINTEGRATING ORAL
Status: COMPLETED | OUTPATIENT
Start: 2020-07-21 | End: 2020-07-21

## 2020-07-21 RX ORDER — HYDROGEN PEROXIDE 3 %
20 SOLUTION, NON-ORAL MISCELLANEOUS
Status: ON HOLD | COMMUNITY
End: 2021-03-24 | Stop reason: HOSPADM

## 2020-07-21 RX ORDER — ALBUTEROL SULFATE 1.25 MG/3ML
1.25 SOLUTION RESPIRATORY (INHALATION) EVERY 6 HOURS PRN
Qty: 1 BOX | Refills: 0 | Status: SHIPPED | OUTPATIENT
Start: 2020-07-21 | End: 2021-07-21

## 2020-07-21 RX ORDER — ALBUTEROL SULFATE 90 UG/1
1-2 AEROSOL, METERED RESPIRATORY (INHALATION) EVERY 4 HOURS PRN
Qty: 18 G | Refills: 0 | Status: SHIPPED | OUTPATIENT
Start: 2020-07-21 | End: 2020-08-20

## 2020-07-21 RX ORDER — ONDANSETRON HYDROCHLORIDE 8 MG/1
8 TABLET, FILM COATED ORAL EVERY 8 HOURS PRN
Qty: 6 TABLET | Refills: 0 | Status: SHIPPED | OUTPATIENT
Start: 2020-07-21 | End: 2020-08-20 | Stop reason: SDUPTHER

## 2020-07-21 RX ADMIN — ONDANSETRON 8 MG: 8 TABLET, ORALLY DISINTEGRATING ORAL at 07:07

## 2020-07-21 NOTE — LETTER
[address] [facility] [address] ? [place], [address] ? Phone [phone] ? Fax             Return to Work/School    Patient: Isaías Rush  YOB: 1995   Date: 07/21/2020      To Whom It May Concern:     Isaías Rush was in contact with/seen in my office on 07/21/2020. COVID-19 is present in our communities across the state. Not all patients are eligible or appropriate to be tested. In this situation, your employee meets the following criteria:     Isaías Rush has met the criteria for COVID-19 testing based upon symptoms, travel, and/or potential exposure. The test has been completed and is pending results at this time. During this time the employee is not able to work and should be quarantined per the Centers for Disease Control timelines.      If you have any questions or concerns, or if I can be of further assistance, please do not hesitate to contact me.     Sincerely,          Aleena Gao PA-C

## 2020-07-22 NOTE — PROGRESS NOTES
Subjective:       Patient ID: Isaías Rush is a 24 y.o. female.    Vitals:  tympanic temperature is 98.7 °F (37.1 °C). Her pulse is 80. Her respiration is 18 and oxygen saturation is 98%.     Chief Complaint: Headache and COVID-19 Concerns    Isaías Rush is a 24-year-old female with a PMH of asthma who presents to urgent care with a headache that began around 12:30, nausea, and some chest tightness.  She had a positive covid exposure (one of her coworkers).  Pt last took tylenol today at 3:30PM.      She has a pro-air inhaler for her asthma.  Her asthma has been well controlled since starting Singulair 3 years ago.  She hasn't used her inhaler today for chest tightness     Headache   This is a new problem. The current episode started today. The problem occurs constantly. The problem has been resolved. The pain is located in the frontal region. The pain does not radiate. The pain quality is similar to prior headaches. The quality of the pain is described as aching, sharp and pulsating. The pain is at a severity of 7/10. The pain is moderate. Associated symptoms include nausea and sinus pressure. Pertinent negatives include no abdominal pain, abnormal behavior, anorexia, back pain, blurred vision, coughing, dizziness, drainage, ear pain, eye pain, eye redness, eye watering, facial sweating, fever, hearing loss, insomnia, loss of balance, muscle aches, neck pain, numbness, phonophobia, photophobia, rhinorrhea, scalp tenderness, seizures, sore throat, swollen glands, tingling, tinnitus, visual change, vomiting, weakness or weight loss. Nothing aggravates the symptoms. She has tried acetaminophen for the symptoms. The treatment provided no relief. There is no history of cancer, cluster headaches, hypertension, immunosuppression, migraine headaches, migraines in the family, obesity, pseudotumor cerebri, recent head traumas, sinus disease or TMJ.       Constitution: Positive for chills. Negative for sweating,  fatigue and fever.   HENT: Positive for congestion and sinus pressure. Negative for ear pain, tinnitus, hearing loss, sinus pain, sore throat and voice change.    Neck: Negative for neck pain and painful lymph nodes.   Eyes: Negative for eye pain, eye redness, photophobia and blurred vision.   Respiratory: Positive for chest tightness (top of chest) and shortness of breath. Negative for cough, sputum production, bloody sputum, COPD, stridor, wheezing and asthma.    Gastrointestinal: Positive for nausea and diarrhea. Negative for abdominal pain and vomiting.   Musculoskeletal: Negative for back pain and muscle ache.   Skin: Negative for rash.   Allergic/Immunologic: Negative for seasonal allergies and asthma.   Neurological: Positive for headaches. Negative for dizziness, loss of balance, history of migraines, numbness and seizures.   Hematologic/Lymphatic: Negative for swollen lymph nodes.   Psychiatric/Behavioral: The patient does not have insomnia.        Objective:      Physical Exam   Constitutional: She is oriented to person, place, and time. She appears well-developed. She is cooperative.  Non-toxic appearance. She does not appear ill. No distress.   HENT:   Head: Normocephalic and atraumatic.   Ears:   Right Ear: Hearing, tympanic membrane, external ear and ear canal normal.   Left Ear: Hearing, tympanic membrane, external ear and ear canal normal.   Nose: Nose normal. No mucosal edema, rhinorrhea or nasal deformity. No epistaxis. Right sinus exhibits no maxillary sinus tenderness and no frontal sinus tenderness. Left sinus exhibits no maxillary sinus tenderness and no frontal sinus tenderness.   Mouth/Throat: Uvula is midline, oropharynx is clear and moist and mucous membranes are normal. No trismus in the jaw. Normal dentition. No uvula swelling. No oropharyngeal exudate, posterior oropharyngeal edema or posterior oropharyngeal erythema.   Eyes: Conjunctivae and lids are normal. No scleral icterus.   Neck:  Trachea normal, full passive range of motion without pain and phonation normal. Neck supple. No neck rigidity. No edema and no erythema present.   Cardiovascular: Normal rate, regular rhythm, normal heart sounds and normal pulses.   Pulmonary/Chest: Effort normal and breath sounds normal. No respiratory distress. She has no decreased breath sounds. She has no rhonchi.   Abdominal: Normal appearance.   Musculoskeletal: Normal range of motion.         General: No deformity.   Neurological: She is alert and oriented to person, place, and time. She exhibits normal muscle tone. Coordination normal.   Skin: Skin is warm, intact, diaphoretic (mild, cheeks flushed ) and not pale. Psychiatric: Her speech is normal and behavior is normal. Judgment and thought content normal.   Nursing note and vitals reviewed.        Assessment:       1. URI Symptoms    2. Asthma, unspecified asthma severity, unspecified whether complicated, unspecified whether persistent    3. Nausea        Plan:         URI Symptoms  Comments:  test for COVID-19, albuterol prn, deep breathing exercises to keep lungs open, discussed CDC guidelines  Orders:  -     COVID-19 Routine Screening  -     albuterol (PROVENTIL/VENTOLIN HFA) 90 mcg/actuation inhaler; Inhale 1-2 puffs into the lungs every 4 (four) hours as needed for Wheezing or Shortness of Breath (cough).  Dispense: 18 g; Refill: 0  -     POCT Influenza A/B    Asthma, unspecified asthma severity, unspecified whether complicated, unspecified whether persistent  Comments:  cont. singulair, albuterol prn for chest tightness   Orders:  -     albuterol (PROVENTIL/VENTOLIN HFA) 90 mcg/actuation inhaler; Inhale 1-2 puffs into the lungs every 4 (four) hours as needed for Wheezing or Shortness of Breath (cough).  Dispense: 18 g; Refill: 0    Nausea  Comments:  Zofran given in clinic for nausea, small bland meals   Orders:  -     ondansetron disintegrating tablet 8 mg  -     ondansetron (ZOFRAN) 8 MG tablet;  "Take 1 tablet (8 mg total) by mouth every 8 (eight) hours as needed for Nausea.  Dispense: 6 tablet; Refill: 0           Please Follow CDC Guidelines on "What to Do if Sick with COVID"  Self quarantine, wear mask in common areas of the home, disinfect common areas, stay home.  If must leave your home, then wear a mask at all times   Supportive care:  Increase fluids, rest, Tylenol for fever, deep breathing exercises to keep lungs open   Please follow-up with [facility] or your primary care physician for new/worsening symptoms     Aleena Gao PA-C   Physician Assistant   [facility]         "

## 2020-07-22 NOTE — PATIENT INSTRUCTIONS
Zofran as needed for nausea   Albuterol inhaler as needed for chest tightness   Deep breathing exercises to keep lungs open   Increase fluids   Rest   Follow CDC guidelines until receive COVID-19 test results     Instructions for Patients with Confirmed or Suspected COVID-19    If you are awaiting your test result, you will either be called or it will be released to the patient portal.  If you have any questions about your test, please visit [email or web address] or call our COVID-19 information line at [phone].      Instructions for non-hospitalized or discharged patients with confirmed or suspected COVID-19:       Stay home except to get medical care.    Separate yourself from other people and animals in your home.    Call ahead before visiting your doctor.    Wear a face mask.    Cover your coughs and sneezes.    Clean your hands often.    Avoid sharing personal household items.    Clean all high-touch surfaces every day.    Monitor your symptoms. Seek prompt medical attention if your illness is worsening (e.g., difficulty breathing). Before seeking care, call your healthcare provider.    If you have a medical emergency and must call 911, notify the dispatcher that you have or are being evaluated for COVID-19. If possible, put on a face mask before emergency medical services arrive.    Use the following symptom-based strategy to return to normal activity following a suspected or confirmed case of COVID-19. Continue isolation until:   o At least 3 days (72 hours) have passed since recovery defined as resolution of fever without the use of fever-reducing medications and improvement in respiratory symptoms (e.g. cough, shortness of breath), and   o At least 10 days have passed since the first positive test.       As one of the next steps, you will receive a call or text from the [place] Department of [facility] ([facility]) COVID-19 Tracing Team. See the contact information below so you know not to  ignore the health departments call. It is important that you contact them back immediately so they can help.     Contact Tracer Number:  [phone]  Caller ID for most carriers: [place] [facility]    What is contact tracing?   Contact tracing is a process that helps identify everyone who has been in close contact with an infected person. Contact tracers let those people know they may have been exposed and guide them on next steps. Confidentiality is important for everyone; no one will be told who may have exposed them to the virus.   Your involvement is important. The more we know about where and how this virus is spreading, the better chance we have at stopping it from spreading further.  What does exposure mean?   Exposure means you have been within 6 feet for more than 15 minutes with a person who has or had COVID-19.  What kind of questions do the contact tracers ask?   A contact tracer will confirm your basic contact information including name, address, phone number, and next of kin, as well as asking about any symptoms you may have had. Theyll also ask you how you think you may have gotten sick, such as places where you may have been exposed to the virus, and people you were with. Those names will never be shared with anyone outside of that call, and will only be used to help trace and stop the spread of the virus.   I have privacy concerns. How will the state use my information?   Your privacy about your health is important. All calls are completed using call centers that use the appropriate health privacy protection measures (HIPAA compliance), meaning that your patient information is safe. No one will ever ask you any questions related to immigration status. Your health comes first.   Do I have to participate?   You do not have to participate, but we strongly encourage you to. Contact tracing can help us catch and control new outbreaks as theyre developing to keep your friends and family safe.    What if I dont hear from anyone?   If you dont receive a call within 24 hours, you can call the number above right away to inquire about your status. That line is open from 8:00 am - 8:00 p.m., 7 days a week.  Contact tracing saves lives! Together, we have the power to beat this virus and keep our loved ones and neighbors safe.       Instructions for household members, intimate partners and caregivers in a non-healthcare setting of a patient with confirmed or suspected COVID-19:         Close contacts should monitor their health and call their healthcare provider right away if they develop symptoms suggestive of COVID-19 (e.g., fever, cough, shortness of breath).    Stay home except to get medical care. Separate yourself from other people and animals in the home.   Monitor the patients symptoms. If the patient is getting sicker, call his or her healthcare provider. If the patient has a medical emergency and you need to call 911, notify the dispatch personnel that the patient has or is being evaluated for COVID-19.    Wear a facemask when around other people such as sharing a room or vehicle and before entering a healthcare provider's office.   Cover coughs and sneezes with a tissue. Throw used tissues in a lined trash can immediately and wash hands.   Clean hands often with soap and water for at least 20 seconds or with an alcohol-based hand , rubbing hands together until they feel dry. Avoid touching your eyes, nose, and mouth with unwashed hands.   Clean all high-touch; surfaces every day, including counters, tabletops, doorknobs, bathroom fixtures, toilets, phones, keyboards, tablets, bedside tables, etc. Use a household cleaning spray or wipe according to label instructions.   Avoid sharing personal household items such as dishes, drinking glasses, cups, towels, bedding, etc. After these items are used, they should be washed thoroughly with soap and water.   Continue isolation  until:   At least 3 days (72 hours) have passed since recovery defined as resolution of fever without the use of fever-reducing medications and improvement in respiratory symptoms (e.g. cough, shortness of breath), and    At least 10 days have passed since the patients first positive test.    https://www.cdc.gov/coronavirus/2019-ncov/your-health/index.htm

## 2020-07-23 LAB — SARS-COV-2 RNA RESP QL NAA+PROBE: NOT DETECTED

## 2020-07-25 ENCOUNTER — TELEPHONE (OUTPATIENT)
Dept: URGENT CARE | Facility: CLINIC | Age: 25
End: 2020-07-25

## 2020-07-25 NOTE — TELEPHONE ENCOUNTER
called to schedule an appointment.  A message was left however there has been no return call at this time.   Spoke with patient; Patient doing much better since visit.

## 2020-07-30 ENCOUNTER — PATIENT OUTREACH (OUTPATIENT)
Dept: ADMINISTRATIVE | Facility: OTHER | Age: 25
End: 2020-07-30

## 2020-07-30 NOTE — PROGRESS NOTES
Requested updates within Care Everywhere.  Patient's chart was reviewed for overdue STEPHANIE topics.  Immunizations reconciled.

## 2020-07-31 ENCOUNTER — OFFICE VISIT (OUTPATIENT)
Dept: OBSTETRICS AND GYNECOLOGY | Facility: CLINIC | Age: 25
End: 2020-07-31
Payer: COMMERCIAL

## 2020-07-31 ENCOUNTER — LAB VISIT (OUTPATIENT)
Dept: LAB | Facility: HOSPITAL | Age: 25
End: 2020-07-31
Attending: NURSE PRACTITIONER
Payer: COMMERCIAL

## 2020-07-31 VITALS
BODY MASS INDEX: 45.44 KG/M2 | DIASTOLIC BLOOD PRESSURE: 68 MMHG | WEIGHT: 246.94 LBS | SYSTOLIC BLOOD PRESSURE: 112 MMHG | HEIGHT: 62 IN

## 2020-07-31 DIAGNOSIS — Z32.01 POSITIVE PREGNANCY TEST: ICD-10-CM

## 2020-07-31 DIAGNOSIS — O26.841 UTERINE SIZE-DATE DISCREPANCY IN FIRST TRIMESTER: ICD-10-CM

## 2020-07-31 DIAGNOSIS — O99.211 OBESITY DURING PREGNANCY IN FIRST TRIMESTER: ICD-10-CM

## 2020-07-31 DIAGNOSIS — Z32.01 POSITIVE PREGNANCY TEST: Primary | ICD-10-CM

## 2020-07-31 DIAGNOSIS — N91.2 AMENORRHEA: ICD-10-CM

## 2020-07-31 LAB
ABO + RH BLD: NORMAL
B-HCG UR QL: POSITIVE
BASOPHILS # BLD AUTO: 0.04 K/UL (ref 0–0.2)
BASOPHILS NFR BLD: 0.8 % (ref 0–1.9)
BLD GP AB SCN CELLS X3 SERPL QL: NORMAL
CTP QC/QA: YES
DIFFERENTIAL METHOD: ABNORMAL
EOSINOPHIL # BLD AUTO: 0.1 K/UL (ref 0–0.5)
EOSINOPHIL NFR BLD: 1.3 % (ref 0–8)
ERYTHROCYTE [DISTWIDTH] IN BLOOD BY AUTOMATED COUNT: 12.2 % (ref 11.5–14.5)
HCT VFR BLD AUTO: 40.3 % (ref 37–48.5)
HGB BLD-MCNC: 13.5 G/DL (ref 12–16)
IMM GRANULOCYTES # BLD AUTO: 0.03 K/UL (ref 0–0.04)
IMM GRANULOCYTES NFR BLD AUTO: 0.6 % (ref 0–0.5)
LYMPHOCYTES # BLD AUTO: 1.7 K/UL (ref 1–4.8)
LYMPHOCYTES NFR BLD: 32.6 % (ref 18–48)
MCH RBC QN AUTO: 31 PG (ref 27–31)
MCHC RBC AUTO-ENTMCNC: 33.5 G/DL (ref 32–36)
MCV RBC AUTO: 92 FL (ref 82–98)
MONOCYTES # BLD AUTO: 0.3 K/UL (ref 0.3–1)
MONOCYTES NFR BLD: 5.7 % (ref 4–15)
NEUTROPHILS # BLD AUTO: 3.1 K/UL (ref 1.8–7.7)
NEUTROPHILS NFR BLD: 59 % (ref 38–73)
NRBC BLD-RTO: 0 /100 WBC
PLATELET # BLD AUTO: 293 K/UL (ref 150–350)
PMV BLD AUTO: 11.7 FL (ref 9.2–12.9)
RBC # BLD AUTO: 4.36 M/UL (ref 4–5.4)
WBC # BLD AUTO: 5.27 K/UL (ref 3.9–12.7)

## 2020-07-31 PROCEDURE — 83036 HEMOGLOBIN GLYCOSYLATED A1C: CPT

## 2020-07-31 PROCEDURE — 99213 OFFICE O/P EST LOW 20 MIN: CPT | Mod: S$GLB,,, | Performed by: MIDWIFE

## 2020-07-31 PROCEDURE — 86762 RUBELLA ANTIBODY: CPT

## 2020-07-31 PROCEDURE — 85025 COMPLETE CBC W/AUTO DIFF WBC: CPT

## 2020-07-31 PROCEDURE — 86703 HIV-1/HIV-2 1 RESULT ANTBDY: CPT

## 2020-07-31 PROCEDURE — 86592 SYPHILIS TEST NON-TREP QUAL: CPT

## 2020-07-31 PROCEDURE — 81025 URINE PREGNANCY TEST: CPT | Mod: S$GLB,,, | Performed by: MIDWIFE

## 2020-07-31 PROCEDURE — 81025 PR  URINE PREGNANCY TEST: ICD-10-PCS | Mod: S$GLB,,, | Performed by: MIDWIFE

## 2020-07-31 PROCEDURE — 83020 HEMOGLOBIN ELECTROPHORESIS: CPT

## 2020-07-31 PROCEDURE — 87086 URINE CULTURE/COLONY COUNT: CPT

## 2020-07-31 PROCEDURE — 80074 ACUTE HEPATITIS PANEL: CPT

## 2020-07-31 PROCEDURE — 36415 COLL VENOUS BLD VENIPUNCTURE: CPT

## 2020-07-31 PROCEDURE — 3008F BODY MASS INDEX DOCD: CPT | Mod: CPTII,S$GLB,, | Performed by: MIDWIFE

## 2020-07-31 PROCEDURE — 3008F PR BODY MASS INDEX (BMI) DOCUMENTED: ICD-10-PCS | Mod: CPTII,S$GLB,, | Performed by: MIDWIFE

## 2020-07-31 PROCEDURE — 99999 PR PBB SHADOW E&M-EST. PATIENT-LVL III: CPT | Mod: PBBFAC,,, | Performed by: MIDWIFE

## 2020-07-31 PROCEDURE — 86901 BLOOD TYPING SEROLOGIC RH(D): CPT

## 2020-07-31 PROCEDURE — 99999 PR PBB SHADOW E&M-EST. PATIENT-LVL III: ICD-10-PCS | Mod: PBBFAC,,, | Performed by: MIDWIFE

## 2020-07-31 PROCEDURE — 99213 PR OFFICE/OUTPT VISIT, EST, LEVL III, 20-29 MIN: ICD-10-PCS | Mod: S$GLB,,, | Performed by: MIDWIFE

## 2020-07-31 PROCEDURE — 87491 CHLMYD TRACH DNA AMP PROBE: CPT

## 2020-07-31 NOTE — PROGRESS NOTES
Isaías Rush No obstetric history on file. complains of amenorrhea.  Patient's last menstrual period was 07/01/2020 (approximate).. UPT is Positive.  She is taking a PNV.  She denies nausea/vomiting.    Past Medical History:   Diagnosis Date    Anxiety     Asthma     Dysfunctional gallbladder     Gastritis     GERD (gastroesophageal reflux disease)     Gluten intolerance     Lactose intolerance      Past Surgical History:   Procedure Laterality Date    CHOLECYSTECTOMY      COLONOSCOPY  12/2018    ESOPHAGOGASTRODUODENOSCOPY N/A 2/5/2020    Procedure: EGD (ESOPHAGOGASTRODUODENOSCOPY);  Surgeon: Barbara Ruby MD;  Location: [facility];  Service: Endoscopy;  Laterality: N/A;    WRIST ARTHROPLASTY Bilateral 2009     Family History   Problem Relation Age of Onset    JESSICA disease Mother     JESSICA disease Maternal Grandmother     Thyroid disease Maternal Grandmother     Arthritis Maternal Grandmother     Heart disease Paternal Grandfather     Breast cancer Other         mat great GM    Colon cancer Neg Hx     Ovarian cancer Neg Hx     Thrombosis Neg Hx      Review of patient's allergies indicates:   Allergen Reactions    Fire ant      anaphylaxis    Cefprozil Hives    Poison ivy extract      Social History     Socioeconomic History    Marital status:      Spouse name: Not on file    Number of children: Not on file    Years of education: Not on file    Highest education level: Not on file   Occupational History    Not on file   Social Needs    Financial resource strain: Not very hard    Food insecurity     Worry: Never true     Inability: Never true    Transportation needs     Medical: No     Non-medical: No   Tobacco Use    Smoking status: Never Smoker    Smokeless tobacco: Never Used   Substance and Sexual Activity    Alcohol use: Yes     Frequency: Monthly or less     Drinks per session: 3 or 4     Binge frequency: Less than monthly     Comment: rare    Drug use: Never     Sexual activity: Yes     Partners: Male     Birth control/protection: None   Lifestyle    Physical activity     Days per week: 0 days     Minutes per session: 0 min    Stress: Rather much   Relationships    Social connections     Talks on phone: Three times a week     Gets together: Once a week     Attends Ten[employer] service: Not on file     Active member of club or organization: Yes     Attends meetings of clubs or organizations: More than 4 times per year     Relationship status:    Other Topics Concern    Not on file   Social History Narrative    Not on file       ROS:  GENERAL: No fever, chills, fatigability or weight loss.  VULVAR: No pain, no lesions and no itching.  VAGINAL: No relaxation, no itching, no discharge, no abnormal bleeding and no lesions.  ABDOMEN: No abdominal pain. Denies nausea. Denies vomiting. No diarrhea. No constipation  BREAST: Denies pain. No lumps. No discharge.  URINARY: No incontinence, no nocturia, no frequency and no dysuria.  CARDIOVASCULAR: No chest pain. No shortness of breath. No leg cramps.  NEUROLOGICAL: no headaches. No vision changes.      Vitals:    07/31/20 0849   BP: 112/68     GENERAL: healthy, alert, no distress, cooperative  ABDOMEN: Normal, benign.  GENITALIA: normal external genitalia, no erythema, no discharge  URETHRA: normal appearing urethra with no masses, tenderness or lesions  VAGINA: normal vagina, no discharge, exudate, lesion, or erythema, gen probe obtained  CERVIX: NoCMT  UTERUS: size consistent with 5-6 weeks  ADNEXA: normal adnexa and no mass, fullness, tenderness      Isaías was seen today for amenorrhea.    Diagnoses and all orders for this visit:    Positive pregnancy test  -     Rubella Antibody, IgG; Future  -     HIV 1/2 Ag/Ab (4th Gen); Future  -     RPR; Future  -     Hemoglobin Electrophoresis,Hgb A2 Guillaume.; Future  -     C. trachomatis/N. gonorrhoeae by AMP DNA  -     Type & Screen - Ob Profile; Future  -     CBC auto differential;  Future  -     Urine culture  -     Hepatitis Panel, Acute; Future    Amenorrhea  -     POCT urine pregnancy    Uterine size-date discrepancy in first trimester  -     US OB/GYN Procedure ([facility]); Future    Obesity during pregnancy in first trimester  -     Hemoglobin A1C; Future

## 2020-08-01 LAB
ESTIMATED AVG GLUCOSE: 105 MG/DL (ref 68–131)
HBA1C MFR BLD HPLC: 5.3 % (ref 4–5.6)
RPR SER QL: NORMAL

## 2020-08-02 LAB — BACTERIA UR CULT: NORMAL

## 2020-08-03 LAB
HAV IGM SERPL QL IA: NEGATIVE
HBV CORE IGM SERPL QL IA: NEGATIVE
HBV SURFACE AG SERPL QL IA: NEGATIVE
HCV AB SERPL QL IA: NEGATIVE
HGB A2 MFR BLD HPLC: 2.9 % (ref 2.2–3.2)
HGB FRACT BLD ELPH-IMP: NORMAL
HGB FRACT BLD ELPH-IMP: NORMAL
HIV 1+2 AB+HIV1 P24 AG SERPL QL IA: NEGATIVE
RUBV IGG SER-ACNC: 38.6 IU/ML
RUBV IGG SER-IMP: REACTIVE

## 2020-08-05 LAB
C TRACH DNA SPEC QL NAA+PROBE: NOT DETECTED
N GONORRHOEA DNA SPEC QL NAA+PROBE: NOT DETECTED

## 2020-08-19 RX ORDER — MONTELUKAST SODIUM 10 MG/1
10 TABLET ORAL NIGHTLY
Qty: 30 TABLET | Refills: 11 | Status: SHIPPED | OUTPATIENT
Start: 2020-08-19 | End: 2020-09-19

## 2020-08-20 ENCOUNTER — PROCEDURE VISIT (OUTPATIENT)
Dept: OBSTETRICS AND GYNECOLOGY | Facility: CLINIC | Age: 25
End: 2020-08-20
Payer: COMMERCIAL

## 2020-08-20 ENCOUNTER — INITIAL PRENATAL (OUTPATIENT)
Dept: OBSTETRICS AND GYNECOLOGY | Facility: CLINIC | Age: 25
End: 2020-08-20
Payer: COMMERCIAL

## 2020-08-20 VITALS — DIASTOLIC BLOOD PRESSURE: 72 MMHG | WEIGHT: 244.5 LBS | BODY MASS INDEX: 44.72 KG/M2 | SYSTOLIC BLOOD PRESSURE: 118 MMHG

## 2020-08-20 DIAGNOSIS — R11.0 NAUSEA: ICD-10-CM

## 2020-08-20 DIAGNOSIS — O99.211 OBESITY DURING PREGNANCY IN FIRST TRIMESTER: Primary | ICD-10-CM

## 2020-08-20 DIAGNOSIS — O26.841 UTERINE SIZE-DATE DISCREPANCY IN FIRST TRIMESTER: ICD-10-CM

## 2020-08-20 PROCEDURE — 0500F INITIAL PRENATAL CARE VISIT: CPT | Mod: S$GLB,,, | Performed by: MIDWIFE

## 2020-08-20 PROCEDURE — 76801 PR US, OB <14WKS, TRANSABD, SINGLE GESTATION: ICD-10-PCS | Mod: S$GLB,,, | Performed by: OBSTETRICS & GYNECOLOGY

## 2020-08-20 PROCEDURE — 0500F PR INITIAL PRENATAL CARE VISIT: ICD-10-PCS | Mod: S$GLB,,, | Performed by: MIDWIFE

## 2020-08-20 PROCEDURE — 76801 OB US < 14 WKS SINGLE FETUS: CPT | Mod: S$GLB,,, | Performed by: OBSTETRICS & GYNECOLOGY

## 2020-08-20 PROCEDURE — 99999 PR PBB SHADOW E&M-EST. PATIENT-LVL II: CPT | Mod: PBBFAC,,, | Performed by: MIDWIFE

## 2020-08-20 PROCEDURE — 99999 PR PBB SHADOW E&M-EST. PATIENT-LVL II: ICD-10-PCS | Mod: PBBFAC,,, | Performed by: MIDWIFE

## 2020-08-20 RX ORDER — ONDANSETRON HYDROCHLORIDE 8 MG/1
8 TABLET, FILM COATED ORAL EVERY 8 HOURS PRN
Qty: 25 TABLET | Refills: 0 | Status: SHIPPED | OUTPATIENT
Start: 2020-08-20 | End: 2020-10-15 | Stop reason: SDUPTHER

## 2020-08-20 RX ORDER — LORATADINE 10 MG/1
10 TABLET ORAL DAILY
COMMUNITY
End: 2021-03-29

## 2020-08-20 NOTE — PROGRESS NOTES
Subjective:      Isaías Rush is being seen today for her first obstetrical visit.  This is a planned pregnancy. She is at 7w4d gestation. Her obstetrical history is significant for obesity. Relationship with FOB: spouse, living together. Patient does intend to breast feed. Pregnancy history fully reviewed.    Menstrual History:  OB History        1    Para        Term                AB        Living           SAB        TAB        Ectopic        Multiple        Live Births                      Patient's last menstrual period was 2020 (approximate).       The following portions of the patient's history were reviewed and updated as appropriate: allergies, current medications, past family history, past medical history, past social history, past surgical history and problem list.    Review of Systems  Pertinent items are noted in HPI.      Objective:      General appearance: alert, appears stated age, cooperative, no distress and moderately obese    Dating US:    SIUP 7w 3d gestation, [place] 2021, FHT's 135 bpm, uterus, cervix and ovaries normal     Assessment:      Pregnancy at 7 and 3/7 weeks      Plan:      Initial labs reviewed  Prenatal vitamins.  Oriented to practice CNM/MD collaboration  Problem list reviewed and updated.  Follow up in 4 weeks.  100% of 20 min visit spent on counseling and coordination of care.

## 2020-09-15 ENCOUNTER — LAB VISIT (OUTPATIENT)
Dept: INTERNAL MEDICINE | Facility: CLINIC | Age: 25
End: 2020-09-15
Payer: COMMERCIAL

## 2020-09-15 ENCOUNTER — OFFICE VISIT (OUTPATIENT)
Dept: INTERNAL MEDICINE | Facility: CLINIC | Age: 25
End: 2020-09-15
Payer: COMMERCIAL

## 2020-09-15 DIAGNOSIS — J06.9 URTI (ACUTE UPPER RESPIRATORY INFECTION): Primary | ICD-10-CM

## 2020-09-15 DIAGNOSIS — R05.9 COUGH: ICD-10-CM

## 2020-09-15 PROCEDURE — 99213 PR OFFICE/OUTPT VISIT, EST, LEVL III, 20-29 MIN: ICD-10-PCS | Mod: 95,,, | Performed by: FAMILY MEDICINE

## 2020-09-15 PROCEDURE — U0003 INFECTIOUS AGENT DETECTION BY NUCLEIC ACID (DNA OR RNA); SEVERE ACUTE RESPIRATORY SYNDROME CORONAVIRUS 2 (SARS-COV-2) (CORONAVIRUS DISEASE [COVID-19]), AMPLIFIED PROBE TECHNIQUE, MAKING USE OF HIGH THROUGHPUT TECHNOLOGIES AS DESCRIBED BY CMS-2020-01-R: HCPCS

## 2020-09-15 PROCEDURE — 99213 OFFICE O/P EST LOW 20 MIN: CPT | Mod: 95,,, | Performed by: FAMILY MEDICINE

## 2020-09-15 NOTE — PROGRESS NOTES
Subjective:       Patient ID: Isaías Rush is a 25 y.o. female.    Chief Complaint: No chief complaint on file.    HPI       The patient location is: car  The chief complaint leading to consultation is: feels sick    Visit type: audiovisual    Face to Face time with patient: 10   minutes of total time spent on the encounter, which includes face to face time and non-face to face time preparing to see the patient (eg, review of tests), Obtaining and/or reviewing separately obtained history, Documenting clinical information in the electronic or other health record, Independently interpreting results (not separately reported) and communicating results to the patient/family/caregiver, or Care coordination (not separately reported).         Each patient to whom he or she provides medical services by telemedicine is:  (1) informed of the relationship between the physician and patient and the respective role of any other health care provider with respect to management of the patient; and (2) notified that he or she may decline to receive medical services by telemedicine and may withdraw from such care at any time.    Notes:       25    Past Medical History:   Diagnosis Date    Anxiety     Asthma     Dysfunctional gallbladder     Gastritis     GERD (gastroesophageal reflux disease)     Gluten intolerance     Lactose intolerance      Past Surgical History:   Procedure Laterality Date    CHOLECYSTECTOMY      COLONOSCOPY  12/2018    ESOPHAGOGASTRODUODENOSCOPY N/A 2/5/2020    Procedure: EGD (ESOPHAGOGASTRODUODENOSCOPY);  Surgeon: Barbara Ruby MD;  Location: [facility];  Service: Endoscopy;  Laterality: N/A;    WRIST ARTHROPLASTY Bilateral 2009     Social History     Tobacco Use   Smoking Status Never Smoker   Smokeless Tobacco Never Used     Presents -   Feeling short of breath  Very congested.  This morning woke - felt clammy  Felt hot  Had zero energy    She is pregnant-  10 weeks pregnant.    Ear is  hurting  Under ear/ neck area hurting    Mild cough    Can smell   Can taste    No diarrhea    Started feeling poorly -   Onset bad this morning - but in total 3 - 4 days.        Review of Systems   Constitutional: Negative for activity change and unexpected weight change.   HENT: Positive for rhinorrhea. Negative for hearing loss and trouble swallowing.    Eyes: Negative for discharge and visual disturbance.   Respiratory: Negative for chest tightness and wheezing.    Cardiovascular: Negative for chest pain and palpitations.   Gastrointestinal: Negative for blood in stool, constipation, diarrhea and vomiting.   Endocrine: Positive for polyuria. Negative for polydipsia.   Genitourinary: Negative for difficulty urinating, dysuria, hematuria and menstrual problem.   Musculoskeletal: Positive for neck pain. Negative for arthralgias and joint swelling.   Neurological: Positive for headaches. Negative for weakness.   Psychiatric/Behavioral: Negative for confusion and dysphoric mood.       Objective:      Physical Exam  HENT:      Head: Normocephalic.   Eyes:      Conjunctiva/sclera: Conjunctivae normal.   Pulmonary:      Effort: Pulmonary effort is normal.      Breath sounds: Normal breath sounds.   Musculoskeletal: Normal range of motion.   Skin:     Coloration: Skin is not pale.   Neurological:      General: No focal deficit present.      Mental Status: She is alert.   Psychiatric:         Mood and Affect: Mood normal.         Assessment:       1. URTI (acute upper respiratory infection)    2. Cough        Plan:   URTI (acute upper respiratory infection)    Cough  -     COVID-19 Routine Screening; Future; Expected date: 09/15/2020      Suspect viral URTI  Given pandemic and symptoms test for covid  Rest  Quarantine  Await results

## 2020-09-16 ENCOUNTER — TELEPHONE (OUTPATIENT)
Dept: PRIMARY CARE CLINIC | Facility: OTHER | Age: 25
End: 2020-09-16

## 2020-09-16 LAB — SARS-COV-2 RNA RESP QL NAA+PROBE: NOT DETECTED

## 2020-09-20 ENCOUNTER — PATIENT MESSAGE (OUTPATIENT)
Dept: OBSTETRICS AND GYNECOLOGY | Facility: CLINIC | Age: 25
End: 2020-09-20

## 2020-09-20 DIAGNOSIS — O99.340 DEPRESSION AFFECTING PREGNANCY: ICD-10-CM

## 2020-09-20 DIAGNOSIS — F32.A DEPRESSION AFFECTING PREGNANCY: ICD-10-CM

## 2020-09-21 PROBLEM — F32.A DEPRESSION AFFECTING PREGNANCY: Status: ACTIVE | Noted: 2020-09-21

## 2020-09-21 PROBLEM — O99.340 DEPRESSION AFFECTING PREGNANCY: Status: ACTIVE | Noted: 2020-09-21

## 2020-09-21 RX ORDER — SERTRALINE HYDROCHLORIDE 25 MG/1
25 TABLET, FILM COATED ORAL DAILY
Qty: 30 TABLET | Refills: 11 | Status: SHIPPED | OUTPATIENT
Start: 2020-09-21 | End: 2021-05-11 | Stop reason: DRUGHIGH

## 2020-09-21 NOTE — TELEPHONE ENCOUNTER
[facility] Note:  Renal Dose Adjustment for Metoclopramide (REGLAN)    Humberto Zarate has been prescribed Metoclopramide (REGLAN) 10 mg every 8 hours as needed for n/v.    Estimated Creatinine Clearance: 33.4 mL/min (based on SCr of 1.23 mg/dL).     His Please advise. Pt has seen Vishal Arellano for her current pregnancy. Vishal Arellano is out of office today.

## 2020-09-22 ENCOUNTER — ROUTINE PRENATAL (OUTPATIENT)
Dept: OBSTETRICS AND GYNECOLOGY | Facility: CLINIC | Age: 25
End: 2020-09-22
Payer: COMMERCIAL

## 2020-09-22 VITALS
DIASTOLIC BLOOD PRESSURE: 84 MMHG | WEIGHT: 237.44 LBS | SYSTOLIC BLOOD PRESSURE: 122 MMHG | BODY MASS INDEX: 43.43 KG/M2

## 2020-09-22 DIAGNOSIS — R53.83 TIREDNESS: ICD-10-CM

## 2020-09-22 DIAGNOSIS — Z3A.12 12 WEEKS GESTATION OF PREGNANCY: ICD-10-CM

## 2020-09-22 DIAGNOSIS — O99.211 OBESITY DURING PREGNANCY IN FIRST TRIMESTER: Primary | ICD-10-CM

## 2020-09-22 PROCEDURE — 0502F PR SUBSEQUENT PRENATAL CARE: ICD-10-PCS | Mod: CPTII,S$GLB,, | Performed by: MIDWIFE

## 2020-09-22 PROCEDURE — 99999 PR PBB SHADOW E&M-EST. PATIENT-LVL II: CPT | Mod: PBBFAC,,, | Performed by: MIDWIFE

## 2020-09-22 PROCEDURE — 99999 PR PBB SHADOW E&M-EST. PATIENT-LVL II: ICD-10-PCS | Mod: PBBFAC,,, | Performed by: MIDWIFE

## 2020-09-22 PROCEDURE — 0502F SUBSEQUENT PRENATAL CARE: CPT | Mod: CPTII,S$GLB,, | Performed by: MIDWIFE

## 2020-09-22 NOTE — PROGRESS NOTES
25 y.o. female  at 12w1d   Denies VB, LOF or cramping  Doing well without concerns   Early glucose testing at [facility] along with Vitamin D level per pt request  TWG: -9 lbs   Reviewed warning signs, pregnancy precautions and how/when to call.  RTC x 4 wks, call or present sooner prn.

## 2020-09-24 ENCOUNTER — PATIENT MESSAGE (OUTPATIENT)
Dept: OBSTETRICS AND GYNECOLOGY | Facility: CLINIC | Age: 25
End: 2020-09-24

## 2020-09-24 ENCOUNTER — NURSE TRIAGE (OUTPATIENT)
Dept: ADMINISTRATIVE | Facility: CLINIC | Age: 25
End: 2020-09-24

## 2020-09-24 DIAGNOSIS — O21.9 NAUSEA AND VOMITING IN PREGNANCY: Primary | ICD-10-CM

## 2020-09-24 RX ORDER — PROMETHAZINE HYDROCHLORIDE 25 MG/1
25 TABLET ORAL EVERY 6 HOURS PRN
Qty: 25 TABLET | Refills: 1 | Status: SHIPPED | OUTPATIENT
Start: 2020-09-24 | End: 2021-02-04 | Stop reason: SDUPTHER

## 2020-09-24 NOTE — TELEPHONE ENCOUNTER
"Patient c/o nausea and diarrhea x 1 watery stool today. Abdominal cramping, 3/10. Care advice discussed, understanding verbalized. Patient will contact office in the morning to go in. Please contact caller directly to discuss any further care advice.    Reason for Disposition   [1] Intermittent lower abdominal pain (e.g., cramping) AND [2] present > 24 hours    Additional Information   Negative: Passed out (i.e., lost consciousness, collapsed and was not responding)   Negative: Shock suspected (e.g., cold/pale/clammy skin, too weak to stand, low BP, rapid pulse)   Negative: Difficult to awaken or acting confused (e.g., disoriented, slurred speech)   Negative: Sounds like a life-threatening emergency to the triager   Negative: Followed an abdomen (stomach) injury   Negative: [1] Abdominal pain AND [2] pregnant > 20 weeks   Negative: MODERATE-SEVERE abdominal pain (e.g., interferes with normal activities, awakens from sleep)   Negative: [1] SEVERE vaginal bleeding (e.g., soaking 2 pads per hour, large blood clots) AND [2] present 2 or more hours   Negative: [1] MODERATE vaginal bleeding (i.e., soaking 1 pad / hour; clots) AND [2] present > 6 hours   Negative: [1] MODERATE vaginal bleeding (i.e., soaking 1 pad / hour; clots) AND [2] pregnant > 12 weeks   Negative: Passed tissue (e.g., gray-white)   Negative: [1] Vomiting AND [2] contains red blood or black ("coffee ground") material  (Exception: few red streaks in vomit that only happened once)   Negative: Shoulder pain   Negative: Lightheadedness or dizziness (e.g., feels like passing out)   Negative: Patient sounds very sick or weak to the triager   Negative: [1] Constant abdominal pain AND [2] present > 2 hours   Negative: Blood in urine (red, pink, or tea-colored)   Negative: Fever > 100.4 F (38.0 C)   Negative: White of the eyes have turned yellow (i.e., jaundice)    Protocols used: PREGNANCY - ABDOMINAL PAIN LESS THAN 20 WEEKS EGA-A-AH      "

## 2020-09-25 ENCOUNTER — TELEPHONE (OUTPATIENT)
Dept: OBSTETRICS AND GYNECOLOGY | Facility: CLINIC | Age: 25
End: 2020-09-25

## 2020-10-08 ENCOUNTER — OFFICE VISIT (OUTPATIENT)
Dept: PODIATRY | Facility: CLINIC | Age: 25
End: 2020-10-08
Payer: COMMERCIAL

## 2020-10-08 VITALS
WEIGHT: 229.94 LBS | BODY MASS INDEX: 42.31 KG/M2 | HEIGHT: 62 IN | DIASTOLIC BLOOD PRESSURE: 82 MMHG | HEART RATE: 70 BPM | SYSTOLIC BLOOD PRESSURE: 137 MMHG

## 2020-10-08 DIAGNOSIS — M79.675 PAIN OF LEFT GREAT TOE: ICD-10-CM

## 2020-10-08 DIAGNOSIS — L60.0 INGROWING NAIL, LEFT GREAT TOE: Primary | ICD-10-CM

## 2020-10-08 PROCEDURE — 99203 OFFICE O/P NEW LOW 30 MIN: CPT | Mod: 25,S$GLB,, | Performed by: PODIATRIST

## 2020-10-08 PROCEDURE — 11750 PR REMOVAL OF NAIL BED: ICD-10-PCS | Mod: TA,S$GLB,, | Performed by: PODIATRIST

## 2020-10-08 PROCEDURE — 99999 PR PBB SHADOW E&M-EST. PATIENT-LVL III: ICD-10-PCS | Mod: PBBFAC,,, | Performed by: PODIATRIST

## 2020-10-08 PROCEDURE — 11750 EXCISION NAIL&NAIL MATRIX: CPT | Mod: TA,S$GLB,, | Performed by: PODIATRIST

## 2020-10-08 PROCEDURE — 3008F PR BODY MASS INDEX (BMI) DOCUMENTED: ICD-10-PCS | Mod: CPTII,S$GLB,, | Performed by: PODIATRIST

## 2020-10-08 PROCEDURE — 99203 PR OFFICE/OUTPT VISIT, NEW, LEVL III, 30-44 MIN: ICD-10-PCS | Mod: 25,S$GLB,, | Performed by: PODIATRIST

## 2020-10-08 PROCEDURE — 3008F BODY MASS INDEX DOCD: CPT | Mod: CPTII,S$GLB,, | Performed by: PODIATRIST

## 2020-10-08 PROCEDURE — 99999 PR PBB SHADOW E&M-EST. PATIENT-LVL III: CPT | Mod: PBBFAC,,, | Performed by: PODIATRIST

## 2020-10-08 NOTE — PROGRESS NOTES
Subjective:       Patient ID: Isaías Rush is a 25 y.o. female.    Chief Complaint: Ingrown Toenail (l. foot 3/10 tennis w/socks non diabetic pt pcp Dr. Del Valle.)      HPI: Isaías Rush presents to the office with complaints of pains to the left great  toe, due to ingrowing. States no drainage. States swelling, redness and moderate to severe pains. Symptoms have been on going for several days and are worsening. States difficulties with walking as a result of pains. Walking and standing, particularly with shoe gear, exacerbates the ailment. Pains are sharp in nature and are rated at approx. 8/10. Patient's Primary Care Provider is Junito Del Valle MD.     Review of patient's allergies indicates:   Allergen Reactions    Fire ant      anaphylaxis    Cefprozil Hives    Poison ivy extract        Past Medical History:   Diagnosis Date    Anxiety     Asthma     Dysfunctional gallbladder     Gastritis     GERD (gastroesophageal reflux disease)     Gluten intolerance     Lactose intolerance        Family History   Problem Relation Age of Onset    JESSICA disease Mother     JESSICA disease Maternal Grandmother     Thyroid disease Maternal Grandmother     Arthritis Maternal Grandmother     Heart disease Paternal Grandfather     Breast cancer Other         mat great GM    Colon cancer Neg Hx     Ovarian cancer Neg Hx     Thrombosis Neg Hx        Social History     Socioeconomic History    Marital status:      Spouse name: Not on file    Number of children: Not on file    Years of education: Not on file    Highest education level: Not on file   Occupational History    Not on file   Social Needs    Financial resource strain: Not very hard    Food insecurity     Worry: Never true     Inability: Never true    Transportation needs     Medical: No     Non-medical: No   Tobacco Use    Smoking status: Never Smoker    Smokeless tobacco: Never Used   Substance and Sexual Activity    Alcohol use: Yes  "    Frequency: Monthly or less     Drinks per session: 3 or 4     Binge frequency: Less than monthly     Comment: rare    Drug use: Never    Sexual activity: Yes     Partners: Male     Birth control/protection: None   Lifestyle    Physical activity     Days per week: 0 days     Minutes per session: 0 min    Stress: Rather much   Relationships    Social connections     Talks on phone: Three times a week     Gets together: Once a week     Attends Orthodox service: Not on file     Active member of club or organization: Yes     Attends meetings of clubs or organizations: More than 4 times per year     Relationship status:    Other Topics Concern    Not on file   Social History Narrative    Not on file       Past Surgical History:   Procedure Laterality Date    CHOLECYSTECTOMY      COLONOSCOPY  12/2018    ESOPHAGOGASTRODUODENOSCOPY N/A 2/5/2020    Procedure: EGD (ESOPHAGOGASTRODUODENOSCOPY);  Surgeon: Barbara Ruby MD;  Location[facility];  Service: Endoscopy;  Laterality: N/A;    WRIST ARTHROPLASTY Bilateral 2009       Review of Systems   Constitutional: Negative for chills, fatigue and fever.   HENT: Negative for hearing loss.    Eyes: Negative for photophobia and visual disturbance.   Respiratory: Negative for cough, chest tightness, shortness of breath and wheezing.    Cardiovascular: Negative for chest pain and palpitations.   Gastrointestinal: Negative for constipation, diarrhea, nausea and vomiting.   Endocrine: Negative for cold intolerance and heat intolerance.   Genitourinary: Negative for flank pain.   Musculoskeletal: Negative for neck pain and neck stiffness.   Skin: Positive for wound.   Neurological: Negative for light-headedness and headaches.   Psychiatric/Behavioral: Negative for sleep disturbance.         Objective:   /82   Pulse 70   Ht 5' 2" (1.575 m)   Wt 104.3 kg (229 lb 15 oz)   LMP 07/01/2020 (Approximate)   BMI 42.06 kg/m²     Physical Exam  LOWER EXTREMITY " PHYSICAL EXAMINATION    VASCULAR: On the left foot, the dorsalis pedis pulse is 2/4 and the posterior tibial pulse is 2/4. Capillary refill time is less than 3 seconds. Hair growth is present on the dorsum of the foot and at the digits. Proximal to distal temperature is warm to warm.    DERMATOLOGY: Ingrowing of the left foot medial and lateral nail border of the great toe. The nail is incurvated into the skin of the affected border, causing pains, which are moderate in nature. There is moderate to severe edema. There is mild cellulitis noted. There is no drainage. No fluctuance. Granuloma formation is absent.    ORTHOPEDIC: Manual Muscle Testing is 5/5 in all planes on the left, without pains, with and without resistance. Gait pattern is slightly antalgic.    Assessment:     1. Ingrowing nail, left great toe    2. Pain of left great toe        Plan:     Ingrowing nail, left great toe    Pain of left great toe        Oral, written and verbal consent were obtained from patient, prior to procedure being performed as discussed below. A TIME-OUT was completed.     The left great toe was anesthetized with a total of 3cc of 2% Lidocaine w/ Epinephrine.  The area was then prepped appropriately with betadine paint. Following this, a [place]gart Elevator was utilized to free up the offending nail border, from the surrounding soft tissues.  Next, an English Anvil was used to split the nail from distal to proximal. Once freed from the soft tissue structures at the of the offending nail fold, a Curved Hemostat was used to remove the offending portion of nail.  A curette was then utilized to remove and and all debris from the border of the nail.    Phenol and Alcohol were then utilized to perform the matrixectomy. Capillary refill to the digit was normal. Antibiotic cream and a sterile bandage with Coban was placed on the digit.      Patient was informed of the possibility of recurrence of an ingrowing nail. Patient was given written  and oral instructions for care including the office phone number if any questions or concerns arise.  Patient will follow up if needed in approx. 2 weeks. Patient will start topical ABx application BID.           Future Appointments   Date Time Provider Department Center   10/19/2020  3:30 PM [facility]   10/22/2020 10:40 AM ERNESTO CLEARY [facility]        The patient has been re-examined and I agree with the above assessment or I updated with my findings.

## 2020-10-12 ENCOUNTER — PATIENT MESSAGE (OUTPATIENT)
Dept: OBSTETRICS AND GYNECOLOGY | Facility: CLINIC | Age: 25
End: 2020-10-12

## 2020-10-15 DIAGNOSIS — R11.0 NAUSEA: ICD-10-CM

## 2020-10-15 RX ORDER — ONDANSETRON HYDROCHLORIDE 8 MG/1
8 TABLET, FILM COATED ORAL DAILY PRN
Qty: 20 TABLET | Refills: 0 | Status: SHIPPED | OUTPATIENT
Start: 2020-10-15 | End: 2020-11-15 | Stop reason: SDUPTHER

## 2020-10-19 ENCOUNTER — ROUTINE PRENATAL (OUTPATIENT)
Dept: OBSTETRICS AND GYNECOLOGY | Facility: CLINIC | Age: 25
End: 2020-10-19
Payer: COMMERCIAL

## 2020-10-19 VITALS — BODY MASS INDEX: 42.62 KG/M2 | WEIGHT: 233 LBS | SYSTOLIC BLOOD PRESSURE: 120 MMHG | DIASTOLIC BLOOD PRESSURE: 72 MMHG

## 2020-10-19 DIAGNOSIS — Z36.3 ANTENATAL SCREENING FOR MALFORMATION USING ULTRASONICS: ICD-10-CM

## 2020-10-19 DIAGNOSIS — Z3A.16 16 WEEKS GESTATION OF PREGNANCY: ICD-10-CM

## 2020-10-19 DIAGNOSIS — O99.212 OBESITY DURING PREGNANCY IN SECOND TRIMESTER: Primary | ICD-10-CM

## 2020-10-19 PROCEDURE — 99999 PR PBB SHADOW E&M-EST. PATIENT-LVL II: ICD-10-PCS | Mod: PBBFAC,,, | Performed by: MIDWIFE

## 2020-10-19 PROCEDURE — 0502F SUBSEQUENT PRENATAL CARE: CPT | Mod: CPTII,S$GLB,, | Performed by: MIDWIFE

## 2020-10-19 PROCEDURE — 99999 PR PBB SHADOW E&M-EST. PATIENT-LVL II: CPT | Mod: PBBFAC,,, | Performed by: MIDWIFE

## 2020-10-19 PROCEDURE — 0502F PR SUBSEQUENT PRENATAL CARE: ICD-10-PCS | Mod: CPTII,S$GLB,, | Performed by: MIDWIFE

## 2020-10-19 NOTE — PROGRESS NOTES
25 y.o. female  at 16w0d   Not feeling flutters/FM, denies VB, LOF or cramping  Doing well without concerns   TWG: -13 lbs   Reviewed prenatal labs  Genetic testing desires quad  Anatomy US ordered  Reviewed warning signs, pregnancy precautions and how/when to call.  RTC x 4 wks, call or present sooner prn.

## 2020-10-21 ENCOUNTER — PATIENT MESSAGE (OUTPATIENT)
Dept: OBSTETRICS AND GYNECOLOGY | Facility: CLINIC | Age: 25
End: 2020-10-21

## 2020-10-21 ENCOUNTER — LAB VISIT (OUTPATIENT)
Dept: LAB | Facility: HOSPITAL | Age: 25
End: 2020-10-21
Attending: MIDWIFE
Payer: COMMERCIAL

## 2020-10-21 DIAGNOSIS — R53.83 TIREDNESS: ICD-10-CM

## 2020-10-21 DIAGNOSIS — O99.211 OBESITY DURING PREGNANCY IN FIRST TRIMESTER: ICD-10-CM

## 2020-10-21 LAB
25(OH)D3+25(OH)D2 SERPL-MCNC: 37 NG/ML (ref 30–96)
GLUCOSE SERPL-MCNC: 110 MG/DL (ref 70–140)

## 2020-10-21 PROCEDURE — 36415 COLL VENOUS BLD VENIPUNCTURE: CPT

## 2020-10-21 PROCEDURE — 82306 VITAMIN D 25 HYDROXY: CPT

## 2020-10-21 PROCEDURE — 82950 GLUCOSE TEST: CPT

## 2020-10-27 ENCOUNTER — PATIENT MESSAGE (OUTPATIENT)
Dept: OBSTETRICS AND GYNECOLOGY | Facility: CLINIC | Age: 25
End: 2020-10-27

## 2020-10-28 ENCOUNTER — PATIENT MESSAGE (OUTPATIENT)
Dept: OBSTETRICS AND GYNECOLOGY | Facility: CLINIC | Age: 25
End: 2020-10-28

## 2020-10-28 DIAGNOSIS — J45.909 ASTHMA, UNSPECIFIED ASTHMA SEVERITY, UNSPECIFIED WHETHER COMPLICATED, UNSPECIFIED WHETHER PERSISTENT: Primary | ICD-10-CM

## 2020-10-28 RX ORDER — MONTELUKAST SODIUM 10 MG/1
10 TABLET ORAL NIGHTLY
Qty: 30 TABLET | Refills: 6 | Status: SHIPPED | OUTPATIENT
Start: 2020-10-28 | End: 2021-06-04

## 2020-11-11 ENCOUNTER — PATIENT MESSAGE (OUTPATIENT)
Dept: OBSTETRICS AND GYNECOLOGY | Facility: CLINIC | Age: 25
End: 2020-11-11

## 2020-11-15 DIAGNOSIS — R11.0 NAUSEA: ICD-10-CM

## 2020-11-16 RX ORDER — ONDANSETRON HYDROCHLORIDE 8 MG/1
8 TABLET, FILM COATED ORAL DAILY PRN
Qty: 20 TABLET | Refills: 0 | Status: SHIPPED | OUTPATIENT
Start: 2020-11-16 | End: 2021-01-08 | Stop reason: SDUPTHER

## 2020-11-17 ENCOUNTER — PATIENT MESSAGE (OUTPATIENT)
Dept: OBSTETRICS AND GYNECOLOGY | Facility: CLINIC | Age: 25
End: 2020-11-17

## 2020-11-17 ENCOUNTER — LAB VISIT (OUTPATIENT)
Dept: LAB | Facility: HOSPITAL | Age: 25
End: 2020-11-17
Attending: OBSTETRICS & GYNECOLOGY
Payer: COMMERCIAL

## 2020-11-17 ENCOUNTER — ROUTINE PRENATAL (OUTPATIENT)
Dept: OBSTETRICS AND GYNECOLOGY | Facility: CLINIC | Age: 25
End: 2020-11-17
Payer: COMMERCIAL

## 2020-11-17 ENCOUNTER — PROCEDURE VISIT (OUTPATIENT)
Dept: OBSTETRICS AND GYNECOLOGY | Facility: CLINIC | Age: 25
End: 2020-11-17
Payer: COMMERCIAL

## 2020-11-17 VITALS — WEIGHT: 229.06 LBS | DIASTOLIC BLOOD PRESSURE: 68 MMHG | SYSTOLIC BLOOD PRESSURE: 120 MMHG | BODY MASS INDEX: 41.9 KG/M2

## 2020-11-17 DIAGNOSIS — O99.340 DEPRESSION AFFECTING PREGNANCY: ICD-10-CM

## 2020-11-17 DIAGNOSIS — Z3A.20 20 WEEKS GESTATION OF PREGNANCY: Primary | ICD-10-CM

## 2020-11-17 DIAGNOSIS — R63.4 WEIGHT LOSS: ICD-10-CM

## 2020-11-17 DIAGNOSIS — N76.0 BACTERIAL VAGINOSIS: ICD-10-CM

## 2020-11-17 DIAGNOSIS — F32.A DEPRESSION AFFECTING PREGNANCY: ICD-10-CM

## 2020-11-17 DIAGNOSIS — O99.212 OBESITY DURING PREGNANCY IN SECOND TRIMESTER: ICD-10-CM

## 2020-11-17 DIAGNOSIS — Z36.3 ANTENATAL SCREENING FOR MALFORMATION USING ULTRASONICS: ICD-10-CM

## 2020-11-17 DIAGNOSIS — B96.89 BACTERIAL VAGINOSIS: ICD-10-CM

## 2020-11-17 PROBLEM — Z34.90 PREGNANCY: Status: ACTIVE | Noted: 2020-11-17

## 2020-11-17 LAB
ALBUMIN SERPL BCP-MCNC: 3.5 G/DL (ref 3.5–5.2)
ALP SERPL-CCNC: 37 U/L (ref 55–135)
ALT SERPL W/O P-5'-P-CCNC: 10 U/L (ref 10–44)
ANION GAP SERPL CALC-SCNC: 9 MMOL/L (ref 8–16)
AST SERPL-CCNC: 12 U/L (ref 10–40)
BILIRUB SERPL-MCNC: 0.3 MG/DL (ref 0.1–1)
BUN SERPL-MCNC: 6 MG/DL (ref 6–20)
CALCIUM SERPL-MCNC: 9.1 MG/DL (ref 8.7–10.5)
CHLORIDE SERPL-SCNC: 105 MMOL/L (ref 95–110)
CO2 SERPL-SCNC: 24 MMOL/L (ref 23–29)
CREAT SERPL-MCNC: 0.7 MG/DL (ref 0.5–1.4)
EST. GFR  (AFRICAN AMERICAN): >60 ML/MIN/1.73 M^2
EST. GFR  (NON AFRICAN AMERICAN): >60 ML/MIN/1.73 M^2
GLUCOSE SERPL-MCNC: 100 MG/DL (ref 70–110)
POTASSIUM SERPL-SCNC: 4.1 MMOL/L (ref 3.5–5.1)
PROT SERPL-MCNC: 6.8 G/DL (ref 6–8.4)
SODIUM SERPL-SCNC: 138 MMOL/L (ref 136–145)
TSH SERPL DL<=0.005 MIU/L-ACNC: 1.2 UIU/ML (ref 0.4–4)

## 2020-11-17 PROCEDURE — 76805 PR US, OB 14+WKS, TRANSABD, SINGLE GESTATION: ICD-10-PCS | Mod: 26,S$GLB,, | Performed by: OBSTETRICS & GYNECOLOGY

## 2020-11-17 PROCEDURE — 84443 ASSAY THYROID STIM HORMONE: CPT

## 2020-11-17 PROCEDURE — 87210 PR  SMEAR,STAIN,WET MNT,INTERP: ICD-10-PCS | Mod: QW,S$GLB,, | Performed by: OBSTETRICS & GYNECOLOGY

## 2020-11-17 PROCEDURE — 80053 COMPREHEN METABOLIC PANEL: CPT

## 2020-11-17 PROCEDURE — 0502F SUBSEQUENT PRENATAL CARE: CPT | Mod: CPTII,S$GLB,, | Performed by: OBSTETRICS & GYNECOLOGY

## 2020-11-17 PROCEDURE — 36415 COLL VENOUS BLD VENIPUNCTURE: CPT

## 2020-11-17 PROCEDURE — 76805 OB US >/= 14 WKS SNGL FETUS: CPT | Mod: 26,S$GLB,, | Performed by: OBSTETRICS & GYNECOLOGY

## 2020-11-17 PROCEDURE — 99999 PR PBB SHADOW E&M-EST. PATIENT-LVL III: CPT | Mod: PBBFAC,,, | Performed by: OBSTETRICS & GYNECOLOGY

## 2020-11-17 PROCEDURE — 99999 PR PBB SHADOW E&M-EST. PATIENT-LVL III: ICD-10-PCS | Mod: PBBFAC,,, | Performed by: OBSTETRICS & GYNECOLOGY

## 2020-11-17 PROCEDURE — 0502F PR SUBSEQUENT PRENATAL CARE: ICD-10-PCS | Mod: CPTII,S$GLB,, | Performed by: OBSTETRICS & GYNECOLOGY

## 2020-11-17 PROCEDURE — 87210 SMEAR WET MOUNT SALINE/INK: CPT | Mod: QW,S$GLB,, | Performed by: OBSTETRICS & GYNECOLOGY

## 2020-11-17 RX ORDER — METRONIDAZOLE 500 MG/1
500 TABLET ORAL 2 TIMES DAILY
Qty: 14 TABLET | Refills: 0 | Status: SHIPPED | OUTPATIENT
Start: 2020-11-17 | End: 2020-11-24

## 2020-11-17 NOTE — PROGRESS NOTES
Here for routine OB visit.  Pt is doing well.  Notes more cramping throughout the day, and she does note a milky white vaginal discharge.  On exam, has white discharge.  Wet prep with clue cells.  Rx for flagyl sent.  Depression improved on zoloft.  Pt has lost 17 lbs since the beginning of the pregnancy.  She attributes this to eating a healthier diet.  Denies any nausea or vomiting, and states she feels well.  Will check TSH and CMP.  Encouraged protein intake, and to continue eating fruits and vegetables.  Will monitor weight.  Signed pt up for [facility].    Sono today: fetal growth at 28th%, anterior placenta, multiple sub-optimal views.  RTC 4 weeks for routine OB visit with repeat ultrasound.

## 2020-11-19 ENCOUNTER — PATIENT MESSAGE (OUTPATIENT)
Dept: ADMINISTRATIVE | Facility: OTHER | Age: 25
End: 2020-11-19

## 2020-11-20 ENCOUNTER — PATIENT MESSAGE (OUTPATIENT)
Dept: OBSTETRICS AND GYNECOLOGY | Facility: CLINIC | Age: 25
End: 2020-11-20

## 2020-11-23 ENCOUNTER — PATIENT MESSAGE (OUTPATIENT)
Dept: OBSTETRICS AND GYNECOLOGY | Facility: CLINIC | Age: 25
End: 2020-11-23

## 2020-11-25 ENCOUNTER — PATIENT MESSAGE (OUTPATIENT)
Dept: OBSTETRICS AND GYNECOLOGY | Facility: CLINIC | Age: 25
End: 2020-11-25

## 2020-11-25 ENCOUNTER — LAB VISIT (OUTPATIENT)
Dept: URGENT CARE | Facility: CLINIC | Age: 25
End: 2020-11-25

## 2020-11-25 ENCOUNTER — TELEPHONE (OUTPATIENT)
Dept: PRIMARY CARE CLINIC | Facility: OTHER | Age: 25
End: 2020-11-25

## 2020-11-25 VITALS — TEMPERATURE: 98 F | DIASTOLIC BLOOD PRESSURE: 71 MMHG | SYSTOLIC BLOOD PRESSURE: 120 MMHG | HEART RATE: 88 BPM

## 2020-11-25 DIAGNOSIS — G44.52 NEW DAILY PERSISTENT HEADACHE: ICD-10-CM

## 2020-11-25 DIAGNOSIS — U07.1 COVID-19 VIRUS DETECTED: ICD-10-CM

## 2020-11-25 DIAGNOSIS — G44.52 NEW DAILY PERSISTENT HEADACHE: Primary | ICD-10-CM

## 2020-11-25 LAB
CTP QC/QA: YES
SARS-COV-2 RDRP RESP QL NAA+PROBE: POSITIVE

## 2020-11-25 PROCEDURE — U0002: ICD-10-PCS | Mod: QW,S$GLB,, | Performed by: INTERNAL MEDICINE

## 2020-11-25 PROCEDURE — U0002 COVID-19 LAB TEST NON-CDC: HCPCS | Mod: QW,S$GLB,, | Performed by: INTERNAL MEDICINE

## 2020-11-25 NOTE — TELEPHONE ENCOUNTER
Call #2: Spoke to patient regarding positive result, symptom onset 11/25/2020.  Community acquired.  KG-11/25/2020

## 2020-11-27 ENCOUNTER — HOSPITAL ENCOUNTER (EMERGENCY)
Facility: HOSPITAL | Age: 25
Discharge: HOME | End: 2020-11-28
Attending: FAMILY MEDICINE
Payer: COMMERCIAL

## 2020-11-27 ENCOUNTER — NURSE TRIAGE (OUTPATIENT)
Dept: ADMINISTRATIVE | Facility: CLINIC | Age: 25
End: 2020-11-27

## 2020-11-27 ENCOUNTER — TELEPHONE (OUTPATIENT)
Dept: INTERNAL MEDICINE | Facility: CLINIC | Age: 25
End: 2020-11-27

## 2020-11-27 ENCOUNTER — HOSPITAL ENCOUNTER (EMERGENCY)
Facility: HOSPITAL | Age: 25
Discharge: LEFT BEFORE DISCHARGE | End: 2020-11-27
Attending: EMERGENCY MEDICINE
Payer: COMMERCIAL

## 2020-11-27 VITALS
OXYGEN SATURATION: 99 % | HEART RATE: 91 BPM | SYSTOLIC BLOOD PRESSURE: 144 MMHG | RESPIRATION RATE: 18 BRPM | TEMPERATURE: 99 F | WEIGHT: 223.31 LBS | HEIGHT: 62 IN | DIASTOLIC BLOOD PRESSURE: 74 MMHG | BODY MASS INDEX: 41.09 KG/M2

## 2020-11-27 DIAGNOSIS — R07.9 CHEST PAIN: ICD-10-CM

## 2020-11-27 DIAGNOSIS — Z3A.21 21 WEEKS GESTATION OF PREGNANCY: ICD-10-CM

## 2020-11-27 DIAGNOSIS — R79.89 POSITIVE D DIMER: ICD-10-CM

## 2020-11-27 DIAGNOSIS — R06.02 SHORTNESS OF BREATH: Primary | ICD-10-CM

## 2020-11-27 DIAGNOSIS — U07.1 COVID-19 VIRUS INFECTION: Primary | ICD-10-CM

## 2020-11-27 DIAGNOSIS — R10.9 ABDOMINAL PAIN: ICD-10-CM

## 2020-11-27 LAB
ALBUMIN SERPL BCP-MCNC: 3.5 G/DL (ref 3.5–5.2)
ALP SERPL-CCNC: 42 U/L (ref 55–135)
ALT SERPL W/O P-5'-P-CCNC: 17 U/L (ref 10–44)
ANION GAP SERPL CALC-SCNC: 11 MMOL/L (ref 8–16)
AST SERPL-CCNC: 19 U/L (ref 10–40)
BASOPHILS # BLD AUTO: 0.02 K/UL (ref 0–0.2)
BASOPHILS NFR BLD: 0.5 % (ref 0–1.9)
BILIRUB SERPL-MCNC: 0.2 MG/DL (ref 0.1–1)
BILIRUB UR QL STRIP: NEGATIVE
BUN SERPL-MCNC: 7 MG/DL (ref 6–20)
CALCIUM SERPL-MCNC: 8.3 MG/DL (ref 8.7–10.5)
CHLORIDE SERPL-SCNC: 109 MMOL/L (ref 95–110)
CK SERPL-CCNC: 22 U/L (ref 20–180)
CLARITY UR REFRACT.AUTO: ABNORMAL
CO2 SERPL-SCNC: 17 MMOL/L (ref 23–29)
COLOR UR AUTO: YELLOW
CREAT SERPL-MCNC: 0.6 MG/DL (ref 0.5–1.4)
D DIMER PPP IA.FEU-MCNC: 0.5 MG/L FEU
DIFFERENTIAL METHOD: ABNORMAL
EOSINOPHIL # BLD AUTO: 0 K/UL (ref 0–0.5)
EOSINOPHIL NFR BLD: 0.9 % (ref 0–8)
ERYTHROCYTE [DISTWIDTH] IN BLOOD BY AUTOMATED COUNT: 12.9 % (ref 11.5–14.5)
EST. GFR  (AFRICAN AMERICAN): >60 ML/MIN/1.73 M^2
EST. GFR  (NON AFRICAN AMERICAN): >60 ML/MIN/1.73 M^2
GLUCOSE SERPL-MCNC: 103 MG/DL (ref 70–110)
GLUCOSE UR QL STRIP: NEGATIVE
HCT VFR BLD AUTO: 33.8 % (ref 37–48.5)
HGB BLD-MCNC: 11.3 G/DL (ref 12–16)
HGB UR QL STRIP: ABNORMAL
IMM GRANULOCYTES # BLD AUTO: 0.03 K/UL (ref 0–0.04)
IMM GRANULOCYTES NFR BLD AUTO: 0.7 % (ref 0–0.5)
KETONES UR QL STRIP: NEGATIVE
LEUKOCYTE ESTERASE UR QL STRIP: NEGATIVE
LIPASE SERPL-CCNC: 37 U/L (ref 4–60)
LYMPHOCYTES # BLD AUTO: 0.8 K/UL (ref 1–4.8)
LYMPHOCYTES NFR BLD: 19.1 % (ref 18–48)
MCH RBC QN AUTO: 31.3 PG (ref 27–31)
MCHC RBC AUTO-ENTMCNC: 33.4 G/DL (ref 32–36)
MCV RBC AUTO: 94 FL (ref 82–98)
MONOCYTES # BLD AUTO: 0.2 K/UL (ref 0.3–1)
MONOCYTES NFR BLD: 5.3 % (ref 4–15)
NEUTROPHILS # BLD AUTO: 3.2 K/UL (ref 1.8–7.7)
NEUTROPHILS NFR BLD: 73.5 % (ref 38–73)
NITRITE UR QL STRIP: NEGATIVE
NRBC BLD-RTO: 0 /100 WBC
PH UR STRIP: 6 [PH] (ref 5–8)
PLATELET # BLD AUTO: 199 K/UL (ref 150–350)
PMV BLD AUTO: 11.4 FL (ref 9.2–12.9)
POTASSIUM SERPL-SCNC: 3.9 MMOL/L (ref 3.5–5.1)
PROT SERPL-MCNC: 6.9 G/DL (ref 6–8.4)
PROT UR QL STRIP: NEGATIVE
RBC # BLD AUTO: 3.61 M/UL (ref 4–5.4)
SODIUM SERPL-SCNC: 137 MMOL/L (ref 136–145)
SP GR UR STRIP: >=1.03 (ref 1–1.03)
TROPONIN I SERPL DL<=0.01 NG/ML-MCNC: 0.01 NG/ML (ref 0–0.03)
URN SPEC COLLECT METH UR: ABNORMAL
UROBILINOGEN UR STRIP-ACNC: NEGATIVE EU/DL
WBC # BLD AUTO: 4.34 K/UL (ref 3.9–12.7)

## 2020-11-27 PROCEDURE — 93010 EKG 12-LEAD: ICD-10-PCS | Mod: ,,, | Performed by: INTERNAL MEDICINE

## 2020-11-27 PROCEDURE — 93010 ELECTROCARDIOGRAM REPORT: CPT | Mod: ,,, | Performed by: INTERNAL MEDICINE

## 2020-11-27 PROCEDURE — 85025 COMPLETE CBC W/AUTO DIFF WBC: CPT | Mod: ER

## 2020-11-27 PROCEDURE — 83690 ASSAY OF LIPASE: CPT | Mod: ER

## 2020-11-27 PROCEDURE — 93005 ELECTROCARDIOGRAM TRACING: CPT | Mod: ER

## 2020-11-27 PROCEDURE — 25500020 PHARM REV CODE 255: Performed by: FAMILY MEDICINE

## 2020-11-27 PROCEDURE — 63600175 PHARM REV CODE 636 W HCPCS: Performed by: FAMILY MEDICINE

## 2020-11-27 PROCEDURE — 96361 HYDRATE IV INFUSION ADD-ON: CPT

## 2020-11-27 PROCEDURE — 85379 FIBRIN DEGRADATION QUANT: CPT | Mod: ER

## 2020-11-27 PROCEDURE — 80053 COMPREHEN METABOLIC PANEL: CPT | Mod: ER

## 2020-11-27 PROCEDURE — 96360 HYDRATION IV INFUSION INIT: CPT

## 2020-11-27 PROCEDURE — 99285 EMERGENCY DEPT VISIT HI MDM: CPT | Mod: 25,ER

## 2020-11-27 PROCEDURE — 81003 URINALYSIS AUTO W/O SCOPE: CPT | Mod: ER

## 2020-11-27 PROCEDURE — 84484 ASSAY OF TROPONIN QUANT: CPT | Mod: ER

## 2020-11-27 PROCEDURE — 99285 EMERGENCY DEPT VISIT HI MDM: CPT | Mod: 25,27

## 2020-11-27 PROCEDURE — 82550 ASSAY OF CK (CPK): CPT | Mod: ER

## 2020-11-27 RX ADMIN — IOHEXOL 100 ML: 350 INJECTION, SOLUTION INTRAVENOUS at 09:11

## 2020-11-27 RX ADMIN — SODIUM CHLORIDE, SODIUM LACTATE, POTASSIUM CHLORIDE, AND CALCIUM CHLORIDE 1000 ML: .6; .31; .03; .02 INJECTION, SOLUTION INTRAVENOUS at 11:11

## 2020-11-27 NOTE — ED PROVIDER NOTES
Encounter Date: 11/27/2020       History     Chief Complaint   Patient presents with    COVID-19 Concerns     Cough, congestion, SOB, dx with covid on Wed    Abdominal Pain     Lower abd pain cramps, x 1 month, 21 preg     The history is provided by the patient.   General Illness   Illness onset: COVID positive 11/25/2020, haing abdominal cramping, with Chest pain, cough, no bleeding  The problem occurs occasionally. The problem has been gradually worsening. The pain is at a severity of 4/10. Nothing relieves the symptoms. The symptoms are aggravated by activity. Associated symptoms include a fever, abdominal pain, congestion, headaches, rhinorrhea, muscle aches, cough and shortness of breath. Pertinent negatives include no decreased vision, no double vision, no eye itching, no photophobia, no constipation, no diarrhea, no nausea, no vomiting, no ear discharge, no ear pain, no hearing loss, no mouth sores, no sore throat, no stridor, no swollen glands, no neck pain, no neck stiffness, no URI, no wheezing, no rash, no diaper rash, no discharge, no pain and no eye redness. She has received no recent medical care.     Review of patient's allergies indicates:   Allergen Reactions    Fire ant Anaphylaxis     anaphylaxis    Cefprozil Hives    Poison ivy extract Rash     Past Medical History:   Diagnosis Date    Anxiety     Asthma     Dysfunctional gallbladder     Gastritis     GERD (gastroesophageal reflux disease)     Gluten intolerance     Lactose intolerance      Past Surgical History:   Procedure Laterality Date    CHOLECYSTECTOMY      COLONOSCOPY  12/2018    ESOPHAGOGASTRODUODENOSCOPY N/A 2/5/2020    Procedure: EGD (ESOPHAGOGASTRODUODENOSCOPY);  Surgeon: Barbara Ruby MD;  Location: [facility];  Service: Endoscopy;  Laterality: N/A;    WRIST ARTHROPLASTY Bilateral 2009     Family History   Problem Relation Age of Onset    JESSICA disease Mother     JESSICA disease Maternal Grandmother     Thyroid  disease Maternal Grandmother     Arthritis Maternal Grandmother     Heart disease Paternal Grandfather     Breast cancer Other         mat great GM    Colon cancer Neg Hx     Ovarian cancer Neg Hx     Thrombosis Neg Hx      Social History     Tobacco Use    Smoking status: Never Smoker    Smokeless tobacco: Never Used   Substance Use Topics    Alcohol use: Yes     Frequency: Monthly or less     Drinks per session: 3 or 4     Binge frequency: Less than monthly     Comment: rare    Drug use: Never     Review of Systems   Constitutional: Positive for fever.   HENT: Positive for congestion and rhinorrhea. Negative for ear discharge, ear pain, hearing loss, mouth sores and sore throat.    Eyes: Negative for double vision, photophobia, pain, discharge, redness and itching.   Respiratory: Positive for cough and shortness of breath. Negative for wheezing and stridor.    Gastrointestinal: Positive for abdominal pain. Negative for constipation, diarrhea, nausea and vomiting.   Musculoskeletal: Negative for neck pain.   Skin: Negative for rash.   Neurological: Positive for headaches.   All other systems reviewed and are negative.      Physical Exam     Initial Vitals [11/27/20 1446]   BP Pulse Resp Temp SpO2   (!) 144/74 91 18 99 °F (37.2 °C) 99 %      MAP       --         Physical Exam    Nursing note and vitals reviewed.  Constitutional: Vital signs are normal. She appears well-developed and well-nourished. She is not diaphoretic. She is cooperative.  Non-toxic appearance. She does not have a sickly appearance. She does not appear ill. No distress.   HENT:   Head: Normocephalic. Head is without laceration.   Right Ear: External ear normal. No swelling or tenderness.   Left Ear: External ear normal. No swelling or tenderness.   Eyes: Conjunctivae and lids are normal. Lids are everted and swept, no foreign bodies found.   Neck: Trachea normal, normal range of motion, full passive range of motion without pain and  phonation normal. Neck supple. No thyromegaly present.   Cardiovascular: Regular rhythm, normal heart sounds, intact distal pulses and normal pulses.   Abdominal: Soft. Normal appearance and bowel sounds are normal. She exhibits no distension, no ascites and no mass. There is no abdominal tenderness.   Lymphadenopathy:     She has no cervical adenopathy.     She has no axillary adenopathy.   Neurological: She is alert and oriented to person, place, and time. She has normal reflexes. No cranial nerve deficit or sensory deficit. GCS eye subscore is 4. GCS verbal subscore is 5. GCS motor subscore is 6.   Skin: Skin is warm, dry and intact. Capillary refill takes less than 2 seconds. No laceration, no rash and no abscess noted. No erythema.   Psychiatric: She has a normal mood and affect. Her speech is normal and behavior is normal. Cognition and memory are normal.         ED Course   Procedures  Labs Reviewed   CBC W/ AUTO DIFFERENTIAL - Abnormal; Notable for the following components:       Result Value    RBC 3.61 (*)     Hemoglobin 11.3 (*)     Hematocrit 33.8 (*)     MCH 31.3 (*)     Immature Granulocytes 0.7 (*)     Lymph # 0.8 (*)     Mono # 0.2 (*)     Gran % 73.5 (*)     All other components within normal limits   COMPREHENSIVE METABOLIC PANEL - Abnormal; Notable for the following components:    CO2 17 (*)     Calcium 8.3 (*)     Alkaline Phosphatase 42 (*)     All other components within normal limits   D DIMER, QUANTITATIVE - Abnormal; Notable for the following components:    D-Dimer 0.50 (*)     All other components within normal limits   TROPONIN I   LIPASE   CK   URINALYSIS, REFLEX TO URINE CULTURE     EKG Readings: (Independently Interpreted)   Initial Reading: No STEMI. Heart Rate: 92. Ectopy: No Ectopy. ST Segments: Normal ST Segments. T Waves: Normal. Other Findings: Shortened KS Interval.     ECG Results          EKG 12-lead (In process)  Result time 11/27/20 15:56:03    In process by Interface, Lab In  [facility] (11/27/20 15:56:03)                 Narrative:    Test Reason : R07.9,    Vent. Rate : 092 BPM     Atrial Rate : 092 BPM     P-R Int : 104 ms          QRS Dur : 072 ms      QT Int : 352 ms       P-R-T Axes : 022 019 025 degrees     QTc Int : 435 ms    Sinus rhythm with short MN  Otherwise normal ECG  No previous ECGs available    Referred By: [email or web address]   SELF           Confirmed By:                             Imaging Results          US OB 14+ Wks, TransAbd, Single Gestation (Final result)  Result time 11/27/20 16:24:43   Procedure changed from US OB <14 Wks, TransAbd, Single Gestation     Final result by Tapan Cummins MD (11/27/20 16:24:43)                 Impression:      Single, live, intrauterine gestation in cephalic presentation with a combine biometric derived estimated gestational age of 21 weeks 4 days and an CHEYENNE of 04/05/2021.    Limited evaluation due to early gestational age and fetal positioning.  Recommend follow-up anatomical survey at 2-4 week interval    No definite abnormality as far as seen.      Electronically signed by: Placido Phelan  Date:    11/27/2020  Time:    16:24             Narrative:    EXAMINATION:  US OB 14+ WEEKS, TRANSABDOM, SINGLE GESTATION    CLINICAL HISTORY:  pain;  Unspecified abdominal pain    TECHNIQUE:  Transabdominal 2nd or 3rd trimester obstetrical ultrasound was performed.    COMPARISON:  None.    FINDINGS:  There is a single, live, intrauterine gestation in cephalic presentation with a combined biometric derived estimated gestational age of 21 weeks 4 days and an CHEYENNE of 04/05/2021.  Estimated fetal weight is 452 g with a Hadlock percentile of 56.9%.  The individual biometric parameters are as follows:    BPD: Unable to measure due to fetal position    Head circumference: Unable to measure due to fetal position    Abdominal circumference: 17 cm, 22 weeks 0 days, +/- 4 weeks    Femur length: 3.5 cm, 21 weeks 1 day, +/- 4 weeks    Amniotic fluid volume is  subjectively adequate.  Fetal cardiac activity is seen with a rate of 124 beats per minute on M-mode imaging.    No evidence placenta previa.  The placenta is anterior.  The cervical length was not measured.  Limited, fetal anatomic survey due to early gestational age.  The adnexal regions and uterus were unremarkable as far seen.                               X-Ray Chest AP Portable (Final result)  Result time 11/27/20 16:03:11    Final result by Kike Hayes MD (11/27/20 16:03:11)                 Impression:      1.  Negative for acute process involving the chest.    2.  Incidental findings as noted above.      Electronically signed by: Kike Hayes MD  Date:    11/27/2020  Time:    16:03             Narrative:    EXAMINATION:  XR CHEST AP PORTABLE    CLINICAL HISTORY:  Cough and congestion    COMPARISON:  No comparison studies are available.    FINDINGS:  The lungs are clear. The cardiac silhouette size is normal. The trachea is midline and the mediastinal width is normal. Negative for focal infiltrate, effusion or pneumothorax. Pulmonary vasculature is normal. Negative for osseous abnormalities. Mild convex right curvature of the thoracolumbar junction.                                  Discussed need for VQ scan to rule out clpts in lung. Patient requesting  to go by private vehicle. Stressed importance of having VQ scan. Voiced understanding.                   The patient is over the age of 18 years, exhibits no evidence of an altered level of consciousness, and possesses appropriate decision-making capacity based on their ability to understand and communicate rationally.  Patient was advised, that, for an optimal recovery, they should be responsible for complying with the individualized treatment plan provided today by the medical professionals at [facility].  This includes taking medications as directed, reviewing and understanding all discharge instructions, and scheduling any appointments that were  recommended.  I informed the patient that failing to take responsibility for their health and safety and not complying with the recommendations provided to today is deemed to be against our medical advice. The patient was provided clear and verbal details regarding alternatives, benefits, risks, and complications related to their condition. In addition, we reiterated the seriousness of their condition and our recommendations for urgent follow-up care with a qualified healthcare provider capable of addressing their specific needs.  Furthermore, the patient was made aware of the serious complications that may be associated with their condition, including: stroke, permanent disability, paralysis, loss of limb(s), and death.  I have personally explained to the patient that choosing to leave against medical advise may result in permanent bodily harm or death. I again discussed at great length that without further evaluation and monitoring there may be unforeseen circumstances and/or deterioration causing permanent bodily harm or death as a result of his/her choice. The patient verbalized these risks back to me in laymans terms.  The patient was able to discuss the treatment that was recommended and, was aware of specific risks associated with the refusal of care relating to their specific condition.  Patient was instructed to return to the emergency department if their condition changes or they wish to comply with our treatment recommendations.              Clinical Impression:       ICD-10-CM ICD-9-CM   1. Shortness of breath  R06.02 786.05   2. Abdominal pain  R10.9 789.00   3. Chest pain  R07.9 786.50   4. Positive D dimer  R79.89 790.92   5. 21 weeks gestation of pregnancy  Z3A.21 V22.2                          ED Disposition Condition    JÚNIOR Pemberton NP  11/27/20 1085

## 2020-11-27 NOTE — ED NOTES
Patient presents to ED today stating that a few days ago she was at work, and she started having hot flashes and feeling bad, and her work sent her home. Patient states her  is COVID positive, so she was instructed to get a Covid test.

## 2020-11-27 NOTE — TELEPHONE ENCOUNTER
"C/o "really congested I can't catch my breath." Pregnant 21 weeks. Patient endorses "moderate SOB". Dizziness with standing.  /70 and pulse 76. Disposition: Go to ED now. Patient acknowledged.    Reason for Disposition   MILD difficulty breathing (e.g., minimal/no SOB at rest, SOB with walking, pulse <100)    Additional Information   Negative: Severe difficulty breathing (e.g., struggling for each breath, speaks in single words)   Negative: Difficult to awaken or acting confused (e.g., disoriented, slurred speech)   Negative: Bluish (or gray) lips or face now   Negative: Shock suspected (e.g., cold/pale/clammy skin, too weak to stand, low BP, rapid pulse)   Negative: Sounds like a life-threatening emergency to the triager   Negative: SEVERE or constant chest pain or pressure (Exception: mild central chest pain, present only when coughing)   Negative: MODERATE difficulty breathing (e.g., speaks in phrases, SOB even at rest, pulse 100-120)    Protocols used: CORONAVIRUS (COVID-19) DIAGNOSED OR [id number]      "

## 2020-11-28 VITALS
WEIGHT: 223.75 LBS | RESPIRATION RATE: 20 BRPM | HEART RATE: 91 BPM | SYSTOLIC BLOOD PRESSURE: 123 MMHG | DIASTOLIC BLOOD PRESSURE: 85 MMHG | OXYGEN SATURATION: 99 % | HEIGHT: 62 IN | BODY MASS INDEX: 41.17 KG/M2 | TEMPERATURE: 98 F

## 2020-11-28 NOTE — DISCHARGE INSTRUCTIONS
Your test was POSITIVE for COVID-19 (coronavirus).       Please isolate yourself at home.  You may leave home and/or return to work once the following conditions are met:    If you were not hospitalized and are not severely immunocompromised*:  More than 10 days since symptoms first appeared AND  More than 24 hours fever free without medications AND  Symptoms have improved     If you were hospitalized OR are severely immunocompromised*:  More than 20 days since symptoms first appeared  More than 24 hours fever free without medications  Symptoms have improved    If you had no symptoms but tested positive:  More than 10 days since the date of the first positive test (20 days if severely immunocompromised).   If you develop symptoms, then use the guidelines above.     *Definition of severely immunocompromised:  Current chemotherapy for cancer  Untreated HIV with CD4 count less than 200  Combined primary immunodeficiency disorder  Prednisone more than 20 mg per day for more than 14 days  Post-transplant patients    Additional instructions:  Separate yourself from other people and animals in your home.  Call ahead before visiting your doctor.  Wear a facemask when around others.  Cover your coughs and sneezes.  Wash your hands often with soap and water; hand  can be used, too.  Avoid sharing personal household items.  Wipe down surfaces used daily.  Monitor your symptoms. Seek prompt medical attention if your illness is worsening (e.g., difficulty breathing).   Before seeking care, call your healthcare provider.  If you have a medical emergency and need to call 911, notify the dispatch personnel that you have, or are being evaluated for COVID-19. If possible, put on a facemask before emergency medical services arrive.        Contact Tracing    As one of the next steps, you will receive a call or text from the [place] Department of Health ([facility]) COVID-19 Tracing Team. See the contact information below so you  know not to ignore the health departments call. It is important that you contact them back immediately so they can help.      Contact Tracer Number:  [phone]  Caller ID for most carriers: [facility]     What is contact tracing?  Contact tracing is a process that helps identify everyone who has been in close contact with an infected person. Contact tracers let those people know they may have been exposed and guide them on next steps. Confidentiality is important for everyone; no one will be told who may have exposed them to the virus.  Your involvement is important. The more we know about where and how this virus is spreading, the better chance we have at stopping it from spreading further.  What does exposure mean?  Exposure means you have been within 6 feet for more than 15 minutes with a person who has or had COVID-19.  What kind of questions do the contact tracers ask?  A contact tracer will confirm your basic contact information including name, address, phone number, and next of kin, as well as asking about any symptoms you may have had. Theyll also ask you how you think you may have gotten sick, such as places where you may have been exposed to the virus, and people you were with. Those names will never be shared with anyone outside of that call, and will only be used to help trace and stop the spread of the virus.   I have privacy concerns. How will the state use my information?  Your privacy about your health is important. All calls are completed using call centers that use the appropriate health privacy protection measures (HIPAA compliance), meaning that your patient information is safe. No one will ever ask you any questions related to immigration status. Your health comes first.   Do I have to participate?  You do not have to participate, but we strongly encourage you to. Contact tracing can help us catch and control new outbreaks as theyre developing to keep your friends and family safe.    What if I dont hear from anyone?  If you dont receive a call within 24 hours, you can call the number above right away to inquire about your status. That line is open from 8:00 am - 8:00 p.m., 7 days a week.  Contact tracing saves lives! Together, we have the power to beat this virus and keep our loved ones and neighbors safe.    For more information see CDC link below.      https://www.cdc.gov/coronavirus/2019-ncov/hcp/guidance-prevent-spread.html#precautions        Sources:  [facility], [place] Department [facility] and [facility]           Sincerely,     Chelsey Acosta MD

## 2020-11-28 NOTE — ED NOTES
Bed is locked and in lowest position. Call light is within pt reach.  RR even and unlabored. Pt shows no signs of acute distress at this time. Pt's family member is at the bedside.

## 2020-11-28 NOTE — ED PROVIDER NOTES
SCRIBE #1 NOTE: ITeresa am scribing for, and in the presence of, Chelsey Acosta MD. I have scribed the entire note.       History     Chief Complaint   Patient presents with    Shortness of Breath     States having congestion and abdominal cramping.  21 weeks preg.  Sent here from [facility] for VQ scan.     Review of patient's allergies indicates:   Allergen Reactions    Fire ant Anaphylaxis     anaphylaxis    Cefprozil Hives    Poison ivy extract Rash         History of Present Illness     HPI    11/27/2020, 5:51 PM  History obtained from the patient      History of Present Illness: Isaías Rush is a 25 y.o. female patient with a PMHx of GERD who is currently 21 weeks pregnant who presents to the Emergency Department for evaluation of SOB which onset gradually. Symptoms are constant and moderate in severity. No mitigating or exacerbating factors reported. Associated sxs include congestion, cough, HA, rhinorrhea, myalgias, and abdominal cramping. Patient denies any fever, chills, nausea, vomiting, diarrhea, constipation, blood in stool, vaginal bleeding, dysuria, hematuria, and all other sxs at this time. No prior Tx. Patient was dx with COVID-19 on 11/25. She was seen at [facility] earlier today for same symptoms and was advised to transfer here to [facility] for VQ scan. Patient left AMA but decided to come later on. No further complaints or concerns at this time.       Arrival mode: Personal vehicle    PCP: Junito Del Valle MD        Past Medical History:  Past Medical History:   Diagnosis Date    Anxiety     Asthma     Dysfunctional gallbladder     Gastritis     GERD (gastroesophageal reflux disease)     Gluten intolerance     Lactose intolerance        Past Surgical History:  Past Surgical History:   Procedure Laterality Date    CHOLECYSTECTOMY      COLONOSCOPY  12/2018    ESOPHAGOGASTRODUODENOSCOPY N/A 2/5/2020    Procedure: EGD (ESOPHAGOGASTRODUODENOSCOPY);  Surgeon: Barbara INFANTE  MD Tung;  [facility]: [facility];  Service: Endoscopy;  Laterality: N/A;    WRIST ARTHROPLASTY Bilateral 2009         Family History:  Family History   Problem Relation Age of Onset    JESSICA disease Mother     JESSICA disease Maternal Grandmother     Thyroid disease Maternal Grandmother     Arthritis Maternal Grandmother     Heart disease Paternal Grandfather     Breast cancer Other         mat great GM    Colon cancer Neg Hx     Ovarian cancer Neg Hx     Thrombosis Neg Hx        Social History:  Social History     Tobacco Use    Smoking status: Never Smoker    Smokeless tobacco: Never Used   Substance and Sexual Activity    Alcohol use: Not Currently     Frequency: Monthly or less     Drinks per session: 3 or 4     Binge frequency: Less than monthly     Comment: rare    Drug use: Never    Sexual activity: Yes     Partners: Male     Birth control/protection: None        Review of Systems     Review of Systems   Constitutional: Negative for activity change, appetite change, chills, diaphoresis, fatigue and fever.   HENT: Positive for congestion and rhinorrhea. Negative for ear pain, nosebleeds, sinus pain, sore throat and trouble swallowing.    Eyes: Negative for pain and discharge.   Respiratory: Positive for cough and shortness of breath. Negative for chest tightness, wheezing and stridor.    Cardiovascular: Negative for chest pain, palpitations and leg swelling.   Gastrointestinal: Positive for abdominal pain (cramping). Negative for abdominal distention, blood in stool, constipation, diarrhea, nausea and vomiting.   Genitourinary: Negative for difficulty urinating, dysuria, flank pain, frequency, hematuria and urgency.   Musculoskeletal: Positive for myalgias. Negative for arthralgias, back pain and neck pain.   Skin: Negative for pallor, rash and wound.   Neurological: Positive for headaches. Negative for dizziness, syncope, weakness, light-headedness and numbness.   Hematological: Does not  "bruise/bleed easily.   Psychiatric/Behavioral: Negative for confusion and self-injury.   All other systems reviewed and are negative.     Physical Exam     Initial Vitals [11/27/20 1735]   BP Pulse Resp Temp SpO2   138/89 90 20 98.1 °F (36.7 °C) 99 %      MAP       --          Physical Exam  Nursing Notes and Vital Signs Reviewed.  Constitutional: Patient is in no acute distress. Well-developed and well-nourished.  Head: Atraumatic. Normocephalic.  Eyes: PERRL. EOM intact. Conjunctivae are not pale. No scleral icterus.  ENT: Mucous membranes are moist. Oropharynx is clear and symmetric.    Neck: Supple. Full ROM. No lymphadenopathy.  Cardiovascular: Regular rate. Regular rhythm. No murmurs, rubs, or gallops. Distal pulses are 2+ and symmetric.  Pulmonary/Chest: No respiratory distress. Clear to auscultation bilaterally. No wheezing or rales.  Abdominal: Soft and non-distended.  There is no tenderness.  No rebound, guarding, or rigidity. Good bowel sounds.  Genitourinary: No CVA tenderness  Musculoskeletal: Moves all extremities. No obvious deformities. No edema. No calf tenderness.  Skin: Warm and dry.  Neurological:  Alert, awake, and appropriate.  Normal speech.  No acute focal neurological deficits are appreciated.  Psychiatric: Normal affect. Good eye contact. Appropriate in content.     ED Course   Procedures  ED Vital Signs:  Vitals:    11/27/20 1735 11/27/20 1923 11/27/20 2221   BP: 138/89 128/80 111/69   Pulse: 90 91 87   Resp: 20 20 18   Temp: 98.1 °F (36.7 °C)     TempSrc: Oral     SpO2: 99% 98% 100%   Weight: 101.5 kg (223 lb 12.3 oz)     Height: 5' 2" (1.575 m)         Abnormal Lab Results:  Labs Reviewed - No data to display     All Lab Results:  none    Imaging Results:  Imaging Results          CTA Chest Non-Coronary (PE Study) (Final result)  Result time 11/27/20 22:12:30    Final result by Tapan Cummins MD (11/27/20 22:12:30)                 Impression:      No pulmonary embolism.  No dissection.  " No pneumonia.      Electronically signed by: Placido Tapan  Date:    11/27/2020  Time:    22:12             Narrative:    EXAMINATION:  CTA CHEST NON CORONARY    CLINICAL HISTORY:  PE suspected, intermediate prob, neg D-dimer;    TECHNIQUE:  Low dose axial images, sagittal and coronal reformations were obtained from the thoracic inlet to the lung bases following the IV administration of 100 mL of Omnipaque 350.  Contrast timing was optimized to evaluate the pulmonary arteries.  MIP images were performed.    COMPARISON:  None    FINDINGS:  No evidence for pulmonary embolism.  No evidence for aortic dissection or aneurysm.  Cardiovascular structures have a normal non gated appearance.  Lungs are essentially clear.  No acute osseous injury                                       The Emergency Provider reviewed the vital signs and test results, which are outlined above.     ED Discussion     5:55 PM: Discussed with pt need for CT scan. Explained all risks regarding imaging with pregnancy. Pt expresses understands and is okay with CT scan.    10:54 PM: Reassessed pt at this time. Will put on COVID-19 home monitoring system. Discussed with pt all pertinent ED information and results. Discussed pt dx and plan of tx. Gave pt all f/u and return to the ED instructions. All questions and concerns were addressed at this time. Pt expresses understanding of information and instructions, and is comfortable with plan to discharge. Pt is stable for discharge.    I discussed with patient  that evaluation in the ED does not suggest any emergent or life threatening medical conditions requiring immediate intervention beyond what was provided in the ED, and I believe patient is safe for discharge.  Regardless, an unremarkable evaluation in the ED does not preclude the development or presence of a serious of life threatening condition. As such, patient was instructed to return immediately for any worsening or change in current symptoms.      "  Medical Decision Making:   Clinical Tests:   Radiological Study: Ordered and Reviewed           ED Medication(s):  Medications   lactated ringers bolus 1,000 mL (has no administration in time range)   iohexoL (OMNIPAQUE 350) injection 100 mL (100 mLs Intravenous Given 11/27/20 1932)       Current Discharge Medication List        Follow-up Information     Junito Del Valle MD. Schedule an appointment as soon as possible for a visit in 3 days.    Specialty: Family Medicine  Why: As needed, If symptoms worsen  Contact information:  [address] [facility] [address] [phone]                  Scribe Attestation:   Scribe #1: I performed the above scribed service and the documentation accurately describes the services I performed. I attest to the accuracy of the note.     Attending:   Physician Attestation Statement for Scribe #1: I, Chelsey Acosta MD, personally performed the services described in this documentation, as scribed by Teresa Restrepo, in my presence, and it is both accurate and complete.           Clinical Impression       ICD-10-CM ICD-9-CM   1. COVID-19 virus infection  U07.1 079.89       Disposition:   Disposition: Discharged  Condition: Stable    Portions of this note may have been created with voice recognition software. Occasional "wrong-word" or "sound-a-like" substitutions may have occurred due to the inherent limitations of voice recognition software. Please, read the note carefully and recognize, using context, where substitutions have occurred.          Chelsey Acosta MD  11/29/20 0703    "

## 2020-12-03 ENCOUNTER — PATIENT MESSAGE (OUTPATIENT)
Dept: OBSTETRICS AND GYNECOLOGY | Facility: CLINIC | Age: 25
End: 2020-12-03

## 2020-12-04 ENCOUNTER — PATIENT MESSAGE (OUTPATIENT)
Dept: INTERNAL MEDICINE | Facility: CLINIC | Age: 25
End: 2020-12-04

## 2020-12-04 ENCOUNTER — TELEPHONE (OUTPATIENT)
Dept: INTERNAL MEDICINE | Facility: CLINIC | Age: 25
End: 2020-12-04

## 2020-12-04 NOTE — TELEPHONE ENCOUNTER
Patient called to get cleared to got back to work.  Just tested positive for COVID on 11/25/20.  Patient instructed to quarantine for a full 14days.  If she is not having symptoms at the end of the 14 days contact our office for clearance. /katy/

## 2020-12-04 NOTE — TELEPHONE ENCOUNTER
----- Message from Betzy Bentley sent at 12/4/2020  9:47 AM CST -----  Regarding: pt  Would like a call from nurse in regards to being cleared , stated employee health wont clear her, stated she lost her taste and smell on yesterday .please call back at [phone].    Thank You ,   Betzy Bentley

## 2020-12-09 ENCOUNTER — PATIENT MESSAGE (OUTPATIENT)
Dept: INTERNAL MEDICINE | Facility: CLINIC | Age: 25
End: 2020-12-09

## 2020-12-09 NOTE — PROGRESS NOTES
Subjective:       Patient ID: Isaías Rush is a 25 y.o. female.    Chief Complaint: No chief complaint on file.    2 weeks post covid test  Feeling improved  No fever  No chills    Some persist ant loss of smell / taste    Congestion better    Cleared to return to work

## 2020-12-10 ENCOUNTER — PATIENT MESSAGE (OUTPATIENT)
Dept: INTERNAL MEDICINE | Facility: CLINIC | Age: 25
End: 2020-12-10

## 2020-12-10 ENCOUNTER — TELEPHONE (OUTPATIENT)
Dept: INTERNAL MEDICINE | Facility: CLINIC | Age: 25
End: 2020-12-10

## 2020-12-10 NOTE — TELEPHONE ENCOUNTER
----- Message from Talia Kirkpatrick sent at 12/10/2020  9:52 AM CST -----  Contact: Isaías  Type:  Patient Returning Call    Who Called: Isaías   Who Left Message for Patient: Unknown   Does the patient know what this is regarding?: return to work letter   Would the patient rather a call back or a response via [employer]? Call back   Best Call Back Number: [phone]  Additional Information:     Criselda,  Jeffery

## 2020-12-10 NOTE — TELEPHONE ENCOUNTER
----- Message from Junito Del Valle MD sent at 12/9/2020  3:05 PM CST -----  Please provide letter to patient to clear to return to work without restriction.

## 2020-12-10 NOTE — TELEPHONE ENCOUNTER
Returned a call to the patient about the document that she sent over.  Informed the patient that a clearance letter will be sent to her [employer] accounts. /katy/

## 2020-12-15 ENCOUNTER — ROUTINE PRENATAL (OUTPATIENT)
Dept: OBSTETRICS AND GYNECOLOGY | Facility: CLINIC | Age: 25
End: 2020-12-15
Payer: COMMERCIAL

## 2020-12-15 ENCOUNTER — PROCEDURE VISIT (OUTPATIENT)
Dept: OBSTETRICS AND GYNECOLOGY | Facility: CLINIC | Age: 25
End: 2020-12-15
Payer: COMMERCIAL

## 2020-12-15 VITALS
DIASTOLIC BLOOD PRESSURE: 78 MMHG | WEIGHT: 229.75 LBS | BODY MASS INDEX: 42.02 KG/M2 | SYSTOLIC BLOOD PRESSURE: 120 MMHG

## 2020-12-15 DIAGNOSIS — O99.212 OBESITY DURING PREGNANCY IN SECOND TRIMESTER: Primary | ICD-10-CM

## 2020-12-15 DIAGNOSIS — Z3A.24 24 WEEKS GESTATION OF PREGNANCY: ICD-10-CM

## 2020-12-15 PROBLEM — B96.89 BACTERIAL VAGINOSIS: Status: RESOLVED | Noted: 2020-11-17 | Resolved: 2020-12-15

## 2020-12-15 PROBLEM — N76.0 BACTERIAL VAGINOSIS: Status: RESOLVED | Noted: 2020-11-17 | Resolved: 2020-12-15

## 2020-12-15 PROBLEM — Z34.90 PREGNANCY: Status: RESOLVED | Noted: 2020-11-17 | Resolved: 2020-12-15

## 2020-12-15 PROCEDURE — 0502F SUBSEQUENT PRENATAL CARE: CPT | Mod: CPTII,S$GLB,, | Performed by: MIDWIFE

## 2020-12-15 PROCEDURE — 99999 PR PBB SHADOW E&M-EST. PATIENT-LVL III: CPT | Mod: PBBFAC,,, | Performed by: MIDWIFE

## 2020-12-15 PROCEDURE — 99999 PR PBB SHADOW E&M-EST. PATIENT-LVL III: ICD-10-PCS | Mod: PBBFAC,,, | Performed by: MIDWIFE

## 2020-12-15 PROCEDURE — 0502F PR SUBSEQUENT PRENATAL CARE: ICD-10-PCS | Mod: CPTII,S$GLB,, | Performed by: MIDWIFE

## 2020-12-15 PROCEDURE — 76816 OB US FOLLOW-UP PER FETUS: CPT | Mod: S$GLB,,, | Performed by: OBSTETRICS & GYNECOLOGY

## 2020-12-15 PROCEDURE — 76816 PR  US,PREGNANT UTERUS,F/U,TRANSABD APP: ICD-10-PCS | Mod: S$GLB,,, | Performed by: OBSTETRICS & GYNECOLOGY

## 2020-12-15 NOTE — PROGRESS NOTES
25 y.o. female  at 24w1d   Reports + FM, denies VB, LOF, or cramping  Doing well without concerns   TWG: -17 lbs   US for sub opt views, EFW 1# 11 oz, 61%, some anatomy still sub opt, will repeat at [place]  Reviewed upcoming 28wk labs, (A POS) and orders placed, tdap handout provided and explained  Reviewed warning signs, normal FM,  labor precautions and how/when to call.  RTC x 4 wks, call or present sooner prn.

## 2021-01-04 ENCOUNTER — PATIENT MESSAGE (OUTPATIENT)
Dept: OBSTETRICS AND GYNECOLOGY | Facility: CLINIC | Age: 26
End: 2021-01-04

## 2021-01-04 ENCOUNTER — HOSPITAL ENCOUNTER (EMERGENCY)
Facility: HOSPITAL | Age: 26
Discharge: HOME | End: 2021-01-04
Attending: EMERGENCY MEDICINE
Payer: COMMERCIAL

## 2021-01-04 VITALS
DIASTOLIC BLOOD PRESSURE: 65 MMHG | OXYGEN SATURATION: 97 % | HEIGHT: 62 IN | TEMPERATURE: 98 F | WEIGHT: 224.63 LBS | SYSTOLIC BLOOD PRESSURE: 114 MMHG | BODY MASS INDEX: 41.34 KG/M2 | RESPIRATION RATE: 18 BRPM | HEART RATE: 87 BPM

## 2021-01-04 DIAGNOSIS — R55 NEAR SYNCOPE: ICD-10-CM

## 2021-01-04 DIAGNOSIS — O13.9 GESTATIONAL HYPERTENSION WITHOUT SIGNIFICANT PROTEINURIA, ANTEPARTUM: Primary | ICD-10-CM

## 2021-01-04 LAB
ALBUMIN SERPL BCP-MCNC: 3.9 G/DL (ref 3.5–5.2)
ALP SERPL-CCNC: 58 U/L (ref 55–135)
ALT SERPL W/O P-5'-P-CCNC: 9 U/L (ref 10–44)
ANION GAP SERPL CALC-SCNC: 14 MMOL/L (ref 8–16)
AST SERPL-CCNC: 20 U/L (ref 10–40)
BASOPHILS # BLD AUTO: 0.05 K/UL (ref 0–0.2)
BASOPHILS NFR BLD: 0.5 % (ref 0–1.9)
BILIRUB SERPL-MCNC: 0.3 MG/DL (ref 0.1–1)
BILIRUB UR QL STRIP: NEGATIVE
BUN SERPL-MCNC: 5 MG/DL (ref 6–20)
CALCIUM SERPL-MCNC: 9.6 MG/DL (ref 8.7–10.5)
CHLORIDE SERPL-SCNC: 107 MMOL/L (ref 95–110)
CLARITY UR: CLEAR
CO2 SERPL-SCNC: 15 MMOL/L (ref 23–29)
COLOR UR: YELLOW
CREAT SERPL-MCNC: 0.6 MG/DL (ref 0.5–1.4)
CREAT UR-MCNC: 24.7 MG/DL (ref 15–325)
DIFFERENTIAL METHOD: ABNORMAL
EOSINOPHIL # BLD AUTO: 0.1 K/UL (ref 0–0.5)
EOSINOPHIL NFR BLD: 1.3 % (ref 0–8)
ERYTHROCYTE [DISTWIDTH] IN BLOOD BY AUTOMATED COUNT: 13.1 % (ref 11.5–14.5)
EST. GFR  (AFRICAN AMERICAN): >60 ML/MIN/1.73 M^2
EST. GFR  (NON AFRICAN AMERICAN): >60 ML/MIN/1.73 M^2
GLUCOSE SERPL-MCNC: 79 MG/DL (ref 70–110)
GLUCOSE UR QL STRIP: NEGATIVE
HCT VFR BLD AUTO: 37 % (ref 37–48.5)
HGB BLD-MCNC: 11.8 G/DL (ref 12–16)
HGB UR QL STRIP: NEGATIVE
IMM GRANULOCYTES # BLD AUTO: 0.1 K/UL (ref 0–0.04)
IMM GRANULOCYTES NFR BLD AUTO: 0.9 % (ref 0–0.5)
KETONES UR QL STRIP: NEGATIVE
LEUKOCYTE ESTERASE UR QL STRIP: NEGATIVE
LYMPHOCYTES # BLD AUTO: 1.4 K/UL (ref 1–4.8)
LYMPHOCYTES NFR BLD: 12.6 % (ref 18–48)
MCH RBC QN AUTO: 31.2 PG (ref 27–31)
MCHC RBC AUTO-ENTMCNC: 31.9 G/DL (ref 32–36)
MCV RBC AUTO: 98 FL (ref 82–98)
MONOCYTES # BLD AUTO: 0.3 K/UL (ref 0.3–1)
MONOCYTES NFR BLD: 2.9 % (ref 4–15)
NEUTROPHILS # BLD AUTO: 9 K/UL (ref 1.8–7.7)
NEUTROPHILS NFR BLD: 81.8 % (ref 38–73)
NITRITE UR QL STRIP: NEGATIVE
NRBC BLD-RTO: 0 /100 WBC
PH UR STRIP: 7 [PH] (ref 5–8)
PLATELET # BLD AUTO: 161 K/UL (ref 150–350)
PLATELET BLD QL SMEAR: ABNORMAL
PMV BLD AUTO: 11.7 FL (ref 9.2–12.9)
POTASSIUM SERPL-SCNC: 4.5 MMOL/L (ref 3.5–5.1)
PROT SERPL-MCNC: 7.9 G/DL (ref 6–8.4)
PROT UR QL STRIP: NEGATIVE
PROT UR-MCNC: <7 MG/DL (ref 0–15)
PROT/CREAT UR: NORMAL MG/G{CREAT} (ref 0–0.2)
RBC # BLD AUTO: 3.78 M/UL (ref 4–5.4)
SODIUM SERPL-SCNC: 136 MMOL/L (ref 136–145)
SP GR UR STRIP: <=1.005 (ref 1–1.03)
URN SPEC COLLECT METH UR: ABNORMAL
UROBILINOGEN UR STRIP-ACNC: NEGATIVE EU/DL
WBC # BLD AUTO: 10.97 K/UL (ref 3.9–12.7)

## 2021-01-04 PROCEDURE — 81003 URINALYSIS AUTO W/O SCOPE: CPT

## 2021-01-04 PROCEDURE — 99284 EMERGENCY DEPT VISIT MOD MDM: CPT | Mod: 25

## 2021-01-04 PROCEDURE — 85025 COMPLETE CBC W/AUTO DIFF WBC: CPT

## 2021-01-04 PROCEDURE — 93005 ELECTROCARDIOGRAM TRACING: CPT

## 2021-01-04 PROCEDURE — 84156 ASSAY OF PROTEIN URINE: CPT

## 2021-01-04 PROCEDURE — 93010 EKG 12-LEAD: ICD-10-PCS | Mod: ,,, | Performed by: INTERNAL MEDICINE

## 2021-01-04 PROCEDURE — 93010 ELECTROCARDIOGRAM REPORT: CPT | Mod: ,,, | Performed by: INTERNAL MEDICINE

## 2021-01-04 PROCEDURE — 80053 COMPREHEN METABOLIC PANEL: CPT

## 2021-01-04 RX ORDER — NAPROXEN 500 MG/1
TABLET ORAL
Status: DISCONTINUED
Start: 2021-01-04 | End: 2021-01-04 | Stop reason: HOSPADM

## 2021-01-04 RX ORDER — SULFAMETHOXAZOLE AND TRIMETHOPRIM 800; 160 MG/1; MG/1
TABLET ORAL
Status: DISCONTINUED
Start: 2021-01-04 | End: 2021-01-04 | Stop reason: HOSPADM

## 2021-01-05 ENCOUNTER — TELEPHONE (OUTPATIENT)
Dept: OBSTETRICS AND GYNECOLOGY | Facility: CLINIC | Age: 26
End: 2021-01-05

## 2021-01-05 ENCOUNTER — ROUTINE PRENATAL (OUTPATIENT)
Dept: OBSTETRICS AND GYNECOLOGY | Facility: CLINIC | Age: 26
End: 2021-01-05
Payer: COMMERCIAL

## 2021-01-05 VITALS
BODY MASS INDEX: 40.85 KG/M2 | SYSTOLIC BLOOD PRESSURE: 100 MMHG | DIASTOLIC BLOOD PRESSURE: 70 MMHG | WEIGHT: 223.31 LBS

## 2021-01-05 DIAGNOSIS — Z34.00 PRIMIGRAVIDA, ANTEPARTUM: ICD-10-CM

## 2021-01-05 DIAGNOSIS — O99.340 DEPRESSION AFFECTING PREGNANCY: ICD-10-CM

## 2021-01-05 DIAGNOSIS — R55 SYNCOPE, UNSPECIFIED SYNCOPE TYPE: ICD-10-CM

## 2021-01-05 DIAGNOSIS — J45.909 ASTHMA, UNSPECIFIED ASTHMA SEVERITY, UNSPECIFIED WHETHER COMPLICATED, UNSPECIFIED WHETHER PERSISTENT: ICD-10-CM

## 2021-01-05 DIAGNOSIS — F32.A DEPRESSION AFFECTING PREGNANCY: ICD-10-CM

## 2021-01-05 PROCEDURE — 0502F PR SUBSEQUENT PRENATAL CARE: ICD-10-PCS | Mod: CPTII,S$GLB,, | Performed by: ADVANCED PRACTICE MIDWIFE

## 2021-01-05 PROCEDURE — 99999 PR PBB SHADOW E&M-EST. PATIENT-LVL III: CPT | Mod: PBBFAC,,, | Performed by: ADVANCED PRACTICE MIDWIFE

## 2021-01-05 PROCEDURE — 0502F SUBSEQUENT PRENATAL CARE: CPT | Mod: CPTII,S$GLB,, | Performed by: ADVANCED PRACTICE MIDWIFE

## 2021-01-05 PROCEDURE — 99999 PR PBB SHADOW E&M-EST. PATIENT-LVL III: ICD-10-PCS | Mod: PBBFAC,,, | Performed by: ADVANCED PRACTICE MIDWIFE

## 2021-01-06 PROBLEM — R55 FAINTING: Status: ACTIVE | Noted: 2021-01-06

## 2021-01-08 DIAGNOSIS — R11.0 NAUSEA: ICD-10-CM

## 2021-01-08 RX ORDER — ONDANSETRON HYDROCHLORIDE 8 MG/1
8 TABLET, FILM COATED ORAL DAILY PRN
Qty: 20 TABLET | Refills: 0 | Status: SHIPPED | OUTPATIENT
Start: 2021-01-08 | End: 2021-03-29

## 2021-01-11 ENCOUNTER — HOSPITAL ENCOUNTER (OUTPATIENT)
Facility: HOSPITAL | Age: 26
Discharge: HOME | End: 2021-01-11
Attending: OBSTETRICS & GYNECOLOGY | Admitting: OBSTETRICS & GYNECOLOGY
Payer: COMMERCIAL

## 2021-01-11 VITALS
OXYGEN SATURATION: 100 % | BODY MASS INDEX: 40.85 KG/M2 | HEIGHT: 62 IN | HEART RATE: 99 BPM | DIASTOLIC BLOOD PRESSURE: 54 MMHG | RESPIRATION RATE: 18 BRPM | TEMPERATURE: 98 F | SYSTOLIC BLOOD PRESSURE: 100 MMHG | WEIGHT: 222 LBS

## 2021-01-11 DIAGNOSIS — R51.9 HEADACHE: ICD-10-CM

## 2021-01-11 DIAGNOSIS — R55 SYNCOPE, UNSPECIFIED SYNCOPE TYPE: Primary | ICD-10-CM

## 2021-01-11 DIAGNOSIS — O16.3 ELEVATED BLOOD PRESSURE AFFECTING PREGNANCY IN THIRD TRIMESTER, ANTEPARTUM: ICD-10-CM

## 2021-01-11 DIAGNOSIS — R00.2 PALPITATIONS: ICD-10-CM

## 2021-01-11 LAB
ALBUMIN SERPL BCP-MCNC: 3.7 G/DL (ref 3.5–5.2)
ALP SERPL-CCNC: 58 U/L (ref 55–135)
ALT SERPL W/O P-5'-P-CCNC: 10 U/L (ref 10–44)
ANION GAP SERPL CALC-SCNC: 15 MMOL/L (ref 8–16)
AST SERPL-CCNC: 16 U/L (ref 10–40)
BASOPHILS # BLD AUTO: 0.04 K/UL (ref 0–0.2)
BASOPHILS NFR BLD: 0.6 % (ref 0–1.9)
BILIRUB SERPL-MCNC: 0.4 MG/DL (ref 0.1–1)
BUN SERPL-MCNC: 4 MG/DL (ref 6–20)
CALCIUM SERPL-MCNC: 9.9 MG/DL (ref 8.7–10.5)
CHLORIDE SERPL-SCNC: 104 MMOL/L (ref 95–110)
CO2 SERPL-SCNC: 19 MMOL/L (ref 23–29)
CREAT SERPL-MCNC: 0.6 MG/DL (ref 0.5–1.4)
CREAT UR-MCNC: 9 MG/DL (ref 15–325)
DIFFERENTIAL METHOD: ABNORMAL
EOSINOPHIL # BLD AUTO: 0.1 K/UL (ref 0–0.5)
EOSINOPHIL NFR BLD: 1.2 % (ref 0–8)
ERYTHROCYTE [DISTWIDTH] IN BLOOD BY AUTOMATED COUNT: 12.6 % (ref 11.5–14.5)
EST. GFR  (AFRICAN AMERICAN): >60 ML/MIN/1.73 M^2
EST. GFR  (NON AFRICAN AMERICAN): >60 ML/MIN/1.73 M^2
GLUCOSE SERPL-MCNC: 72 MG/DL (ref 70–110)
HCT VFR BLD AUTO: 33.5 % (ref 37–48.5)
HGB BLD-MCNC: 11.3 G/DL (ref 12–16)
IMM GRANULOCYTES # BLD AUTO: 0.06 K/UL (ref 0–0.04)
IMM GRANULOCYTES NFR BLD AUTO: 0.9 % (ref 0–0.5)
LYMPHOCYTES # BLD AUTO: 1.5 K/UL (ref 1–4.8)
LYMPHOCYTES NFR BLD: 21.8 % (ref 18–48)
MCH RBC QN AUTO: 31.9 PG (ref 27–31)
MCHC RBC AUTO-ENTMCNC: 33.7 G/DL (ref 32–36)
MCV RBC AUTO: 95 FL (ref 82–98)
MONOCYTES # BLD AUTO: 0.2 K/UL (ref 0.3–1)
MONOCYTES NFR BLD: 3.3 % (ref 4–15)
NEUTROPHILS # BLD AUTO: 5 K/UL (ref 1.8–7.7)
NEUTROPHILS NFR BLD: 72.2 % (ref 38–73)
NRBC BLD-RTO: 0 /100 WBC
PLATELET # BLD AUTO: 287 K/UL (ref 150–350)
PMV BLD AUTO: 11.6 FL (ref 9.2–12.9)
POTASSIUM SERPL-SCNC: 3.9 MMOL/L (ref 3.5–5.1)
PROT SERPL-MCNC: 7.3 G/DL (ref 6–8.4)
PROT UR-MCNC: <7 MG/DL (ref 0–15)
PROT/CREAT UR: ABNORMAL MG/G{CREAT} (ref 0–0.2)
RBC # BLD AUTO: 3.54 M/UL (ref 4–5.4)
SODIUM SERPL-SCNC: 138 MMOL/L (ref 136–145)
WBC # BLD AUTO: 6.92 K/UL (ref 3.9–12.7)

## 2021-01-11 PROCEDURE — 59025 FETAL NON-STRESS TEST: CPT | Mod: 26,,, | Performed by: ADVANCED PRACTICE MIDWIFE

## 2021-01-11 PROCEDURE — 85025 COMPLETE CBC W/AUTO DIFF WBC: CPT

## 2021-01-11 PROCEDURE — 82570 ASSAY OF URINE CREATININE: CPT

## 2021-01-11 PROCEDURE — 93010 ELECTROCARDIOGRAM REPORT: CPT | Mod: ,,, | Performed by: INTERNAL MEDICINE

## 2021-01-11 PROCEDURE — 93010 EKG 12-LEAD: ICD-10-PCS | Mod: ,,, | Performed by: INTERNAL MEDICINE

## 2021-01-11 PROCEDURE — 36415 COLL VENOUS BLD VENIPUNCTURE: CPT

## 2021-01-11 PROCEDURE — 99211 OFF/OP EST MAY X REQ PHY/QHP: CPT | Mod: 25

## 2021-01-11 PROCEDURE — 99213 PR OFFICE/OUTPT VISIT, EST, LEVL III, 20-29 MIN: ICD-10-PCS | Mod: 25,,, | Performed by: ADVANCED PRACTICE MIDWIFE

## 2021-01-11 PROCEDURE — 59025 FETAL NON-STRESS TEST: CPT

## 2021-01-11 PROCEDURE — 80053 COMPREHEN METABOLIC PANEL: CPT

## 2021-01-11 PROCEDURE — G0378 HOSPITAL OBSERVATION PER HR: HCPCS

## 2021-01-11 PROCEDURE — 93005 ELECTROCARDIOGRAM TRACING: CPT

## 2021-01-11 PROCEDURE — 99213 OFFICE O/P EST LOW 20 MIN: CPT | Mod: 25,,, | Performed by: ADVANCED PRACTICE MIDWIFE

## 2021-01-11 PROCEDURE — 59025 PR FETAL 2N-STRESS TEST: ICD-10-PCS | Mod: 26,,, | Performed by: ADVANCED PRACTICE MIDWIFE

## 2021-01-11 RX ORDER — SODIUM CHLORIDE 9 MG/ML
INJECTION, SOLUTION INTRAVENOUS CONTINUOUS
Status: DISCONTINUED | OUTPATIENT
Start: 2021-01-11 | End: 2021-01-11 | Stop reason: HOSPADM

## 2021-01-13 ENCOUNTER — HOSPITAL ENCOUNTER (OUTPATIENT)
Dept: CARDIOLOGY | Facility: HOSPITAL | Age: 26
Discharge: HOME | End: 2021-01-13
Attending: INTERNAL MEDICINE
Payer: COMMERCIAL

## 2021-01-13 ENCOUNTER — OFFICE VISIT (OUTPATIENT)
Dept: CARDIOLOGY | Facility: CLINIC | Age: 26
End: 2021-01-13
Payer: COMMERCIAL

## 2021-01-13 VITALS
OXYGEN SATURATION: 98 % | HEART RATE: 78 BPM | WEIGHT: 222.88 LBS | DIASTOLIC BLOOD PRESSURE: 55 MMHG | BODY MASS INDEX: 40.77 KG/M2 | SYSTOLIC BLOOD PRESSURE: 107 MMHG

## 2021-01-13 VITALS
SYSTOLIC BLOOD PRESSURE: 107 MMHG | WEIGHT: 222 LBS | HEIGHT: 62 IN | DIASTOLIC BLOOD PRESSURE: 55 MMHG | BODY MASS INDEX: 40.85 KG/M2

## 2021-01-13 DIAGNOSIS — O16.3 ELEVATED BLOOD PRESSURE AFFECTING PREGNANCY IN THIRD TRIMESTER, ANTEPARTUM: Primary | ICD-10-CM

## 2021-01-13 DIAGNOSIS — R55 SYNCOPE, UNSPECIFIED SYNCOPE TYPE: ICD-10-CM

## 2021-01-13 LAB
AORTIC ROOT ANNULUS: 2.66 CM
AV INDEX (PROSTH): 0.77
AV MEAN GRADIENT: 5 MMHG
AV PEAK GRADIENT: 9 MMHG
AV VALVE AREA: 2.33 CM2
AV VELOCITY RATIO: 0.78
BSA FOR ECHO PROCEDURE: 2.1 M2
CV ECHO LV RWT: 0.35 CM
DOP CALC AO PEAK VEL: 1.54 M/S
DOP CALC AO VTI: 28.61 CM
DOP CALC LVOT AREA: 3 CM2
DOP CALC LVOT DIAMETER: 1.96 CM
DOP CALC LVOT PEAK VEL: 1.2 M/S
DOP CALC LVOT STROKE VOLUME: 66.65 CM3
DOP CALC RVOT PEAK VEL: 1.26 M/S
DOP CALC RVOT VTI: 26.98 CM
DOP CALCLVOT PEAK VEL VTI: 22.1 CM
E WAVE DECELERATION TIME: 355.38 MSEC
E/A RATIO: 1.51
E/E' RATIO: 7.36 M/S
ECHO LV POSTERIOR WALL: 0.87 CM (ref 0.6–1.1)
FRACTIONAL SHORTENING: 37 % (ref 28–44)
INTERVENTRICULAR SEPTUM: 0.8 CM (ref 0.6–1.1)
IVRT: 100.35 MSEC
LA MAJOR: 3.86 CM
LA MINOR: 3.56 CM
LA WIDTH: 3.24 CM
LEFT ATRIUM SIZE: 3.97 CM
LEFT ATRIUM VOLUME INDEX: 20.3 ML/M2
LEFT ATRIUM VOLUME: 40.5 CM3
LEFT INTERNAL DIMENSION IN SYSTOLE: 3.07 CM (ref 2.1–4)
LEFT VENTRICLE DIASTOLIC VOLUME INDEX: 56.72 ML/M2
LEFT VENTRICLE DIASTOLIC VOLUME: 113.33 ML
LEFT VENTRICLE MASS INDEX: 70 G/M2
LEFT VENTRICLE SYSTOLIC VOLUME INDEX: 18.5 ML/M2
LEFT VENTRICLE SYSTOLIC VOLUME: 36.94 ML
LEFT VENTRICULAR INTERNAL DIMENSION IN DIASTOLE: 4.91 CM (ref 3.5–6)
LEFT VENTRICULAR MASS: 139.14 G
LV LATERAL E/E' RATIO: 6.44 M/S
LV SEPTAL E/E' RATIO: 8.58 M/S
MV PEAK A VEL: 0.68 M/S
MV PEAK E VEL: 1.03 M/S
PISA TR MAX VEL: 2.18 M/S
PULM VEIN S/D RATIO: 0.84
PV MEAN GRADIENT: 4 MMHG
PV PEAK D VEL: 0.69 M/S
PV PEAK GRADIENT: 6 MMHG
PV PEAK S VEL: 0.58 M/S
PV PEAK VELOCITY: 1.36 CM/S
RA MAJOR: 3.56 CM
RA PRESSURE: 3 MMHG
RA WIDTH: 3.5 CM
RIGHT VENTRICULAR END-DIASTOLIC DIMENSION: 3.32 CM
SINUS: 2.7 CM
STJ: 2.74 CM
TDI LATERAL: 0.16 M/S
TDI SEPTAL: 0.12 M/S
TDI: 0.14 M/S
TR MAX PG: 19 MMHG
TRICUSPID ANNULAR PLANE SYSTOLIC EXCURSION: 2.1 CM
TV REST PULMONARY ARTERY PRESSURE: 22 MMHG

## 2021-01-13 PROCEDURE — 99999 PR PBB SHADOW E&M-EST. PATIENT-LVL III: CPT | Mod: PBBFAC,,, | Performed by: INTERNAL MEDICINE

## 2021-01-13 PROCEDURE — 93306 TTE W/DOPPLER COMPLETE: CPT | Mod: 26,,, | Performed by: INTERNAL MEDICINE

## 2021-01-13 PROCEDURE — 99244 PR OFFICE CONSULTATION,LEVEL IV: ICD-10-PCS | Mod: S$GLB,,, | Performed by: INTERNAL MEDICINE

## 2021-01-13 PROCEDURE — 99244 OFF/OP CNSLTJ NEW/EST MOD 40: CPT | Mod: S$GLB,,, | Performed by: INTERNAL MEDICINE

## 2021-01-13 PROCEDURE — 93306 ECHO (CUPID ONLY): ICD-10-PCS | Mod: 26,,, | Performed by: INTERNAL MEDICINE

## 2021-01-13 PROCEDURE — 99999 PR PBB SHADOW E&M-EST. PATIENT-LVL III: ICD-10-PCS | Mod: PBBFAC,,, | Performed by: INTERNAL MEDICINE

## 2021-01-13 PROCEDURE — 3008F BODY MASS INDEX DOCD: CPT | Mod: CPTII,S$GLB,, | Performed by: INTERNAL MEDICINE

## 2021-01-13 PROCEDURE — 3008F PR BODY MASS INDEX (BMI) DOCUMENTED: ICD-10-PCS | Mod: CPTII,S$GLB,, | Performed by: INTERNAL MEDICINE

## 2021-01-13 PROCEDURE — 93306 TTE W/DOPPLER COMPLETE: CPT | Mod: PO

## 2021-01-15 ENCOUNTER — PROCEDURE VISIT (OUTPATIENT)
Dept: OBSTETRICS AND GYNECOLOGY | Facility: CLINIC | Age: 26
End: 2021-01-15
Payer: COMMERCIAL

## 2021-01-15 ENCOUNTER — ROUTINE PRENATAL (OUTPATIENT)
Dept: OBSTETRICS AND GYNECOLOGY | Facility: CLINIC | Age: 26
End: 2021-01-15
Payer: COMMERCIAL

## 2021-01-15 VITALS
BODY MASS INDEX: 40.93 KG/M2 | SYSTOLIC BLOOD PRESSURE: 118 MMHG | DIASTOLIC BLOOD PRESSURE: 70 MMHG | WEIGHT: 223.75 LBS

## 2021-01-15 DIAGNOSIS — Z23 NEED FOR TDAP VACCINATION: ICD-10-CM

## 2021-01-15 DIAGNOSIS — Z3A.28 28 WEEKS GESTATION OF PREGNANCY: ICD-10-CM

## 2021-01-15 DIAGNOSIS — O99.213 OBESITY DURING PREGNANCY IN THIRD TRIMESTER: Primary | ICD-10-CM

## 2021-01-15 PROCEDURE — 76816 OB US FOLLOW-UP PER FETUS: CPT | Mod: S$GLB,,, | Performed by: OBSTETRICS & GYNECOLOGY

## 2021-01-15 PROCEDURE — 99999 PR PBB SHADOW E&M-EST. PATIENT-LVL III: CPT | Mod: PBBFAC,,, | Performed by: MIDWIFE

## 2021-01-15 PROCEDURE — 90715 TDAP VACCINE GREATER THAN OR EQUAL TO 7YO IM: ICD-10-PCS | Mod: S$GLB,,, | Performed by: OBSTETRICS & GYNECOLOGY

## 2021-01-15 PROCEDURE — 90715 TDAP VACCINE 7 YRS/> IM: CPT | Mod: S$GLB,,, | Performed by: OBSTETRICS & GYNECOLOGY

## 2021-01-15 PROCEDURE — 0502F SUBSEQUENT PRENATAL CARE: CPT | Mod: CPTII,S$GLB,, | Performed by: MIDWIFE

## 2021-01-15 PROCEDURE — 76816 PR  US,PREGNANT UTERUS,F/U,TRANSABD APP: ICD-10-PCS | Mod: S$GLB,,, | Performed by: OBSTETRICS & GYNECOLOGY

## 2021-01-15 PROCEDURE — 90471 IMMUNIZATION ADMIN: CPT | Mod: S$GLB,,, | Performed by: OBSTETRICS & GYNECOLOGY

## 2021-01-15 PROCEDURE — 0502F PR SUBSEQUENT PRENATAL CARE: ICD-10-PCS | Mod: CPTII,S$GLB,, | Performed by: MIDWIFE

## 2021-01-15 PROCEDURE — 90471 TDAP VACCINE GREATER THAN OR EQUAL TO 7YO IM: ICD-10-PCS | Mod: S$GLB,,, | Performed by: OBSTETRICS & GYNECOLOGY

## 2021-01-15 PROCEDURE — 99999 PR PBB SHADOW E&M-EST. PATIENT-LVL III: ICD-10-PCS | Mod: PBBFAC,,, | Performed by: MIDWIFE

## 2021-01-19 ENCOUNTER — PATIENT MESSAGE (OUTPATIENT)
Dept: CARDIOLOGY | Facility: CLINIC | Age: 26
End: 2021-01-19

## 2021-01-19 ENCOUNTER — PATIENT MESSAGE (OUTPATIENT)
Dept: PHARMACY | Facility: CLINIC | Age: 26
End: 2021-01-19

## 2021-01-21 ENCOUNTER — LAB VISIT (OUTPATIENT)
Dept: LAB | Facility: HOSPITAL | Age: 26
End: 2021-01-21
Attending: FAMILY MEDICINE
Payer: COMMERCIAL

## 2021-01-21 ENCOUNTER — PATIENT MESSAGE (OUTPATIENT)
Dept: OBSTETRICS AND GYNECOLOGY | Facility: CLINIC | Age: 26
End: 2021-01-21

## 2021-01-21 DIAGNOSIS — O99.213 OBESITY DURING PREGNANCY IN THIRD TRIMESTER: ICD-10-CM

## 2021-01-21 LAB
BASOPHILS # BLD AUTO: 0.03 K/UL (ref 0–0.2)
BASOPHILS NFR BLD: 0.5 % (ref 0–1.9)
DIFFERENTIAL METHOD: ABNORMAL
EOSINOPHIL # BLD AUTO: 0.1 K/UL (ref 0–0.5)
EOSINOPHIL NFR BLD: 1 % (ref 0–8)
ERYTHROCYTE [DISTWIDTH] IN BLOOD BY AUTOMATED COUNT: 13.2 % (ref 11.5–14.5)
GLUCOSE SERPL-MCNC: 120 MG/DL (ref 70–140)
HCT VFR BLD AUTO: 31.4 % (ref 37–48.5)
HGB BLD-MCNC: 9.9 G/DL (ref 12–16)
IMM GRANULOCYTES # BLD AUTO: 0.08 K/UL (ref 0–0.04)
IMM GRANULOCYTES NFR BLD AUTO: 1.3 % (ref 0–0.5)
LYMPHOCYTES # BLD AUTO: 1.4 K/UL (ref 1–4.8)
LYMPHOCYTES NFR BLD: 21.5 % (ref 18–48)
MCH RBC QN AUTO: 31.3 PG (ref 27–31)
MCHC RBC AUTO-ENTMCNC: 31.5 G/DL (ref 32–36)
MCV RBC AUTO: 99 FL (ref 82–98)
MONOCYTES # BLD AUTO: 0.2 K/UL (ref 0.3–1)
MONOCYTES NFR BLD: 3.7 % (ref 4–15)
NEUTROPHILS # BLD AUTO: 4.5 K/UL (ref 1.8–7.7)
NEUTROPHILS NFR BLD: 72 % (ref 38–73)
NRBC BLD-RTO: 0 /100 WBC
PLATELET # BLD AUTO: 235 K/UL (ref 150–350)
PMV BLD AUTO: 11.6 FL (ref 9.2–12.9)
RBC # BLD AUTO: 3.16 M/UL (ref 4–5.4)
WBC # BLD AUTO: 6.27 K/UL (ref 3.9–12.7)

## 2021-01-21 PROCEDURE — 85025 COMPLETE CBC W/AUTO DIFF WBC: CPT

## 2021-01-21 PROCEDURE — 82950 GLUCOSE TEST: CPT

## 2021-01-21 PROCEDURE — 36415 COLL VENOUS BLD VENIPUNCTURE: CPT

## 2021-01-21 PROCEDURE — 86592 SYPHILIS TEST NON-TREP QUAL: CPT

## 2021-01-21 PROCEDURE — 86703 HIV-1/HIV-2 1 RESULT ANTBDY: CPT

## 2021-01-22 ENCOUNTER — HOSPITAL ENCOUNTER (OUTPATIENT)
Dept: CARDIOLOGY | Facility: HOSPITAL | Age: 26
Discharge: HOME | End: 2021-01-22
Attending: INTERNAL MEDICINE
Payer: COMMERCIAL

## 2021-01-22 DIAGNOSIS — R55 SYNCOPE, UNSPECIFIED SYNCOPE TYPE: ICD-10-CM

## 2021-01-22 LAB
HIV 1+2 AB+HIV1 P24 AG SERPL QL IA: NEGATIVE
RPR SER QL: NORMAL

## 2021-01-22 PROCEDURE — 93246 EXT ECG>7D<15D RECORDING: CPT

## 2021-01-22 PROCEDURE — 93248 EXT ECG>7D<15D REV&INTERPJ: CPT | Mod: ,,, | Performed by: INTERNAL MEDICINE

## 2021-01-22 PROCEDURE — 93248 CV CARDIAC MONITOR - 3-14 DAY ADULT (CUPID ONLY): ICD-10-PCS | Mod: ,,, | Performed by: INTERNAL MEDICINE

## 2021-01-22 PROCEDURE — 93247 EXT ECG>7D<15D SCAN A/R: CPT

## 2021-01-23 PROBLEM — O99.013 ANEMIA DURING PREGNANCY IN THIRD TRIMESTER: Status: ACTIVE | Noted: 2021-01-23

## 2021-01-26 ENCOUNTER — PATIENT MESSAGE (OUTPATIENT)
Dept: OBSTETRICS AND GYNECOLOGY | Facility: CLINIC | Age: 26
End: 2021-01-26

## 2021-01-27 ENCOUNTER — PATIENT MESSAGE (OUTPATIENT)
Dept: CARDIOLOGY | Facility: CLINIC | Age: 26
End: 2021-01-27

## 2021-01-27 ENCOUNTER — OFFICE VISIT (OUTPATIENT)
Dept: CARDIOLOGY | Facility: CLINIC | Age: 26
End: 2021-01-27
Payer: COMMERCIAL

## 2021-01-27 VITALS
SYSTOLIC BLOOD PRESSURE: 120 MMHG | DIASTOLIC BLOOD PRESSURE: 60 MMHG | BODY MASS INDEX: 40.85 KG/M2 | OXYGEN SATURATION: 98 % | HEART RATE: 91 BPM | WEIGHT: 223.31 LBS

## 2021-01-27 DIAGNOSIS — O16.3 ELEVATED BLOOD PRESSURE AFFECTING PREGNANCY IN THIRD TRIMESTER, ANTEPARTUM: ICD-10-CM

## 2021-01-27 DIAGNOSIS — R55 SYNCOPE, UNSPECIFIED SYNCOPE TYPE: Primary | ICD-10-CM

## 2021-01-27 PROCEDURE — 99999 PR PBB SHADOW E&M-EST. PATIENT-LVL III: ICD-10-PCS | Mod: PBBFAC,,, | Performed by: INTERNAL MEDICINE

## 2021-01-27 PROCEDURE — 3008F PR BODY MASS INDEX (BMI) DOCUMENTED: ICD-10-PCS | Mod: CPTII,S$GLB,, | Performed by: INTERNAL MEDICINE

## 2021-01-27 PROCEDURE — 99999 PR PBB SHADOW E&M-EST. PATIENT-LVL III: CPT | Mod: PBBFAC,,, | Performed by: INTERNAL MEDICINE

## 2021-01-27 PROCEDURE — 99213 OFFICE O/P EST LOW 20 MIN: CPT | Mod: S$GLB,,, | Performed by: INTERNAL MEDICINE

## 2021-01-27 PROCEDURE — 99213 PR OFFICE/OUTPT VISIT, EST, LEVL III, 20-29 MIN: ICD-10-PCS | Mod: S$GLB,,, | Performed by: INTERNAL MEDICINE

## 2021-01-27 PROCEDURE — 3008F BODY MASS INDEX DOCD: CPT | Mod: CPTII,S$GLB,, | Performed by: INTERNAL MEDICINE

## 2021-01-28 ENCOUNTER — PATIENT MESSAGE (OUTPATIENT)
Dept: OBSTETRICS AND GYNECOLOGY | Facility: CLINIC | Age: 26
End: 2021-01-28

## 2021-01-28 ENCOUNTER — PATIENT MESSAGE (OUTPATIENT)
Dept: CARDIOLOGY | Facility: CLINIC | Age: 26
End: 2021-01-28

## 2021-02-03 ENCOUNTER — PATIENT MESSAGE (OUTPATIENT)
Dept: OBSTETRICS AND GYNECOLOGY | Facility: CLINIC | Age: 26
End: 2021-02-03

## 2021-02-03 ENCOUNTER — ROUTINE PRENATAL (OUTPATIENT)
Dept: OBSTETRICS AND GYNECOLOGY | Facility: CLINIC | Age: 26
End: 2021-02-03
Payer: COMMERCIAL

## 2021-02-03 ENCOUNTER — PROCEDURE VISIT (OUTPATIENT)
Dept: OBSTETRICS AND GYNECOLOGY | Facility: CLINIC | Age: 26
End: 2021-02-03
Payer: COMMERCIAL

## 2021-02-03 VITALS — WEIGHT: 230.19 LBS | BODY MASS INDEX: 42.1 KG/M2 | SYSTOLIC BLOOD PRESSURE: 112 MMHG | DIASTOLIC BLOOD PRESSURE: 76 MMHG

## 2021-02-03 DIAGNOSIS — O99.013 ANEMIA DURING PREGNANCY IN THIRD TRIMESTER: ICD-10-CM

## 2021-02-03 DIAGNOSIS — Z3A.31 31 WEEKS GESTATION OF PREGNANCY: ICD-10-CM

## 2021-02-03 DIAGNOSIS — O99.213 OBESITY DURING PREGNANCY IN THIRD TRIMESTER: Primary | ICD-10-CM

## 2021-02-03 PROCEDURE — 0502F SUBSEQUENT PRENATAL CARE: CPT | Mod: CPTII,S$GLB,, | Performed by: MIDWIFE

## 2021-02-03 PROCEDURE — 0502F PR SUBSEQUENT PRENATAL CARE: ICD-10-PCS | Mod: CPTII,S$GLB,, | Performed by: MIDWIFE

## 2021-02-03 PROCEDURE — 99999 PR PBB SHADOW E&M-EST. PATIENT-LVL III: CPT | Mod: PBBFAC,,, | Performed by: MIDWIFE

## 2021-02-03 PROCEDURE — 76816 PR  US,PREGNANT UTERUS,F/U,TRANSABD APP: ICD-10-PCS | Mod: S$GLB,,, | Performed by: OBSTETRICS & GYNECOLOGY

## 2021-02-03 PROCEDURE — 76816 OB US FOLLOW-UP PER FETUS: CPT | Mod: S$GLB,,, | Performed by: OBSTETRICS & GYNECOLOGY

## 2021-02-03 PROCEDURE — 76819 PR US, OB, FETAL BIOPHYSICAL, W/O NST: ICD-10-PCS | Mod: S$GLB,,, | Performed by: OBSTETRICS & GYNECOLOGY

## 2021-02-03 PROCEDURE — 99999 PR PBB SHADOW E&M-EST. PATIENT-LVL III: ICD-10-PCS | Mod: PBBFAC,,, | Performed by: MIDWIFE

## 2021-02-03 PROCEDURE — 76819 FETAL BIOPHYS PROFIL W/O NST: CPT | Mod: S$GLB,,, | Performed by: OBSTETRICS & GYNECOLOGY

## 2021-02-04 DIAGNOSIS — O21.9 NAUSEA AND VOMITING IN PREGNANCY: ICD-10-CM

## 2021-02-04 RX ORDER — PROMETHAZINE HYDROCHLORIDE 25 MG/1
25 TABLET ORAL EVERY 6 HOURS PRN
Qty: 25 TABLET | Refills: 1 | Status: SHIPPED | OUTPATIENT
Start: 2021-02-04 | End: 2021-03-29

## 2021-02-05 ENCOUNTER — PATIENT MESSAGE (OUTPATIENT)
Dept: OBSTETRICS AND GYNECOLOGY | Facility: CLINIC | Age: 26
End: 2021-02-05

## 2021-02-08 ENCOUNTER — PATIENT MESSAGE (OUTPATIENT)
Dept: OBSTETRICS AND GYNECOLOGY | Facility: CLINIC | Age: 26
End: 2021-02-08

## 2021-02-10 ENCOUNTER — PATIENT MESSAGE (OUTPATIENT)
Dept: OBSTETRICS AND GYNECOLOGY | Facility: CLINIC | Age: 26
End: 2021-02-10

## 2021-02-12 ENCOUNTER — PATIENT MESSAGE (OUTPATIENT)
Dept: CARDIOLOGY | Facility: CLINIC | Age: 26
End: 2021-02-12

## 2021-02-12 ENCOUNTER — HOSPITAL ENCOUNTER (OUTPATIENT)
Facility: HOSPITAL | Age: 26
Discharge: HOME | End: 2021-02-12
Attending: OBSTETRICS & GYNECOLOGY | Admitting: OBSTETRICS & GYNECOLOGY
Payer: COMMERCIAL

## 2021-02-12 ENCOUNTER — PATIENT MESSAGE (OUTPATIENT)
Dept: OBSTETRICS AND GYNECOLOGY | Facility: CLINIC | Age: 26
End: 2021-02-12

## 2021-02-12 VITALS
HEART RATE: 74 BPM | OXYGEN SATURATION: 100 % | DIASTOLIC BLOOD PRESSURE: 59 MMHG | HEIGHT: 62 IN | WEIGHT: 230.19 LBS | SYSTOLIC BLOOD PRESSURE: 103 MMHG | RESPIRATION RATE: 18 BRPM | BODY MASS INDEX: 42.36 KG/M2 | TEMPERATURE: 98 F

## 2021-02-12 DIAGNOSIS — K59.00 CONSTIPATION DURING PREGNANCY IN THIRD TRIMESTER: ICD-10-CM

## 2021-02-12 DIAGNOSIS — O99.613 CONSTIPATION DURING PREGNANCY IN THIRD TRIMESTER: ICD-10-CM

## 2021-02-12 PROBLEM — O16.3 ELEVATED BLOOD PRESSURE AFFECTING PREGNANCY IN THIRD TRIMESTER, ANTEPARTUM: Status: RESOLVED | Noted: 2021-01-11 | Resolved: 2021-02-12

## 2021-02-12 PROBLEM — R51.9 HEADACHE: Status: RESOLVED | Noted: 2021-01-11 | Resolved: 2021-02-12

## 2021-02-12 PROCEDURE — 25000003 PHARM REV CODE 250: Performed by: MIDWIFE

## 2021-02-12 PROCEDURE — 99211 OFF/OP EST MAY X REQ PHY/QHP: CPT | Mod: 25

## 2021-02-12 PROCEDURE — 59025 OBTAIN FETAL NONSTRESS TEST (NST): ICD-10-PCS | Mod: 26,,, | Performed by: MIDWIFE

## 2021-02-12 PROCEDURE — 59025 FETAL NON-STRESS TEST: CPT | Mod: 26,,, | Performed by: MIDWIFE

## 2021-02-12 PROCEDURE — 99213 PR OFFICE/OUTPT VISIT, EST, LEVL III, 20-29 MIN: ICD-10-PCS | Mod: 25,,, | Performed by: MIDWIFE

## 2021-02-12 PROCEDURE — 59025 FETAL NON-STRESS TEST: CPT

## 2021-02-12 PROCEDURE — 99213 OFFICE O/P EST LOW 20 MIN: CPT | Mod: 25,,, | Performed by: MIDWIFE

## 2021-02-12 RX ORDER — PSEUDOEPHEDRINE/ACETAMINOPHEN 30MG-500MG
100 TABLET ORAL
Status: COMPLETED | OUTPATIENT
Start: 2021-02-12 | End: 2021-02-12

## 2021-02-12 RX ORDER — ONDANSETRON 8 MG/1
8 TABLET, ORALLY DISINTEGRATING ORAL EVERY 8 HOURS PRN
Status: DISCONTINUED | OUTPATIENT
Start: 2021-02-12 | End: 2021-02-12 | Stop reason: HOSPADM

## 2021-02-12 RX ORDER — SYRING-NEEDL,DISP,INSUL,0.3 ML 29 G X1/2"
296 SYRINGE, EMPTY DISPOSABLE MISCELLANEOUS ONCE
Status: COMPLETED | OUTPATIENT
Start: 2021-02-12 | End: 2021-02-12

## 2021-02-12 RX ORDER — ACETAMINOPHEN 500 MG
500 TABLET ORAL EVERY 6 HOURS PRN
Status: DISCONTINUED | OUTPATIENT
Start: 2021-02-12 | End: 2021-02-12 | Stop reason: HOSPADM

## 2021-02-12 RX ADMIN — Medication 100 ML: at 01:02

## 2021-02-12 RX ADMIN — SODIUM CHLORIDE 500 ML: 0.9 INJECTION, SOLUTION INTRAVENOUS at 01:02

## 2021-02-12 RX ADMIN — MAGNESIUM CITRATE 296 ML: 1.75 LIQUID ORAL at 01:02

## 2021-02-19 ENCOUNTER — ROUTINE PRENATAL (OUTPATIENT)
Dept: OBSTETRICS AND GYNECOLOGY | Facility: CLINIC | Age: 26
End: 2021-02-19
Payer: COMMERCIAL

## 2021-02-19 VITALS
DIASTOLIC BLOOD PRESSURE: 72 MMHG | BODY MASS INDEX: 42.14 KG/M2 | SYSTOLIC BLOOD PRESSURE: 108 MMHG | WEIGHT: 230.38 LBS

## 2021-02-19 DIAGNOSIS — O99.013 ANEMIA DURING PREGNANCY IN THIRD TRIMESTER: Primary | ICD-10-CM

## 2021-02-19 DIAGNOSIS — R55 SYNCOPE, UNSPECIFIED SYNCOPE TYPE: ICD-10-CM

## 2021-02-19 DIAGNOSIS — Z3A.33 33 WEEKS GESTATION OF PREGNANCY: ICD-10-CM

## 2021-02-19 DIAGNOSIS — O99.213 OBESITY DURING PREGNANCY IN THIRD TRIMESTER: ICD-10-CM

## 2021-02-19 PROCEDURE — 99999 PR PBB SHADOW E&M-EST. PATIENT-LVL III: CPT | Mod: PBBFAC,,, | Performed by: MIDWIFE

## 2021-02-19 PROCEDURE — 0502F PR SUBSEQUENT PRENATAL CARE: ICD-10-PCS | Mod: CPTII,S$GLB,, | Performed by: MIDWIFE

## 2021-02-19 PROCEDURE — 0502F SUBSEQUENT PRENATAL CARE: CPT | Mod: CPTII,S$GLB,, | Performed by: MIDWIFE

## 2021-02-19 PROCEDURE — 99999 PR PBB SHADOW E&M-EST. PATIENT-LVL III: ICD-10-PCS | Mod: PBBFAC,,, | Performed by: MIDWIFE

## 2021-02-20 ENCOUNTER — PATIENT MESSAGE (OUTPATIENT)
Dept: OBSTETRICS AND GYNECOLOGY | Facility: CLINIC | Age: 26
End: 2021-02-20

## 2021-02-24 ENCOUNTER — LAB VISIT (OUTPATIENT)
Dept: LAB | Facility: HOSPITAL | Age: 26
End: 2021-02-24
Attending: INTERNAL MEDICINE
Payer: COMMERCIAL

## 2021-02-24 ENCOUNTER — OFFICE VISIT (OUTPATIENT)
Dept: CARDIOLOGY | Facility: CLINIC | Age: 26
End: 2021-02-24
Payer: COMMERCIAL

## 2021-02-24 VITALS
OXYGEN SATURATION: 99 % | HEART RATE: 86 BPM | BODY MASS INDEX: 41.81 KG/M2 | WEIGHT: 228.63 LBS | SYSTOLIC BLOOD PRESSURE: 118 MMHG | DIASTOLIC BLOOD PRESSURE: 58 MMHG

## 2021-02-24 DIAGNOSIS — R07.9 CHEST PAIN, UNSPECIFIED TYPE: ICD-10-CM

## 2021-02-24 DIAGNOSIS — R07.9 CHEST PAIN, UNSPECIFIED TYPE: Primary | ICD-10-CM

## 2021-02-24 DIAGNOSIS — R55 VASOVAGAL SYNCOPE: ICD-10-CM

## 2021-02-24 DIAGNOSIS — R00.2 PALPITATIONS: Primary | ICD-10-CM

## 2021-02-24 DIAGNOSIS — Z3A.34 34 WEEKS GESTATION OF PREGNANCY: ICD-10-CM

## 2021-02-24 LAB — TROPONIN I SERPL DL<=0.01 NG/ML-MCNC: <0.006 NG/ML (ref 0–0.03)

## 2021-02-24 PROCEDURE — 99215 PR OFFICE/OUTPT VISIT, EST, LEVL V, 40-54 MIN: ICD-10-PCS | Mod: S$GLB,,, | Performed by: INTERNAL MEDICINE

## 2021-02-24 PROCEDURE — 93010 EKG 12-LEAD: ICD-10-PCS | Mod: S$GLB,,, | Performed by: INTERNAL MEDICINE

## 2021-02-24 PROCEDURE — 3008F PR BODY MASS INDEX (BMI) DOCUMENTED: ICD-10-PCS | Mod: CPTII,S$GLB,, | Performed by: INTERNAL MEDICINE

## 2021-02-24 PROCEDURE — 93010 ELECTROCARDIOGRAM REPORT: CPT | Mod: S$GLB,,, | Performed by: INTERNAL MEDICINE

## 2021-02-24 PROCEDURE — 3008F BODY MASS INDEX DOCD: CPT | Mod: CPTII,S$GLB,, | Performed by: INTERNAL MEDICINE

## 2021-02-24 PROCEDURE — 99215 OFFICE O/P EST HI 40 MIN: CPT | Mod: S$GLB,,, | Performed by: INTERNAL MEDICINE

## 2021-02-24 PROCEDURE — 99999 PR PBB SHADOW E&M-EST. PATIENT-LVL III: ICD-10-PCS | Mod: PBBFAC,,, | Performed by: INTERNAL MEDICINE

## 2021-02-24 PROCEDURE — 84484 ASSAY OF TROPONIN QUANT: CPT | Mod: PO

## 2021-02-24 PROCEDURE — 93005 ELECTROCARDIOGRAM TRACING: CPT | Mod: PO

## 2021-02-24 PROCEDURE — 99999 PR PBB SHADOW E&M-EST. PATIENT-LVL III: CPT | Mod: PBBFAC,,, | Performed by: INTERNAL MEDICINE

## 2021-02-24 PROCEDURE — 36415 COLL VENOUS BLD VENIPUNCTURE: CPT | Mod: PO

## 2021-02-25 ENCOUNTER — PATIENT MESSAGE (OUTPATIENT)
Dept: CARDIOLOGY | Facility: CLINIC | Age: 26
End: 2021-02-25

## 2021-02-25 ENCOUNTER — PATIENT MESSAGE (OUTPATIENT)
Dept: OBSTETRICS AND GYNECOLOGY | Facility: CLINIC | Age: 26
End: 2021-02-25

## 2021-03-01 ENCOUNTER — PATIENT MESSAGE (OUTPATIENT)
Dept: OBSTETRICS AND GYNECOLOGY | Facility: CLINIC | Age: 26
End: 2021-03-01

## 2021-03-08 ENCOUNTER — PROCEDURE VISIT (OUTPATIENT)
Dept: OBSTETRICS AND GYNECOLOGY | Facility: CLINIC | Age: 26
End: 2021-03-08
Payer: COMMERCIAL

## 2021-03-08 ENCOUNTER — ROUTINE PRENATAL (OUTPATIENT)
Dept: OBSTETRICS AND GYNECOLOGY | Facility: CLINIC | Age: 26
End: 2021-03-08
Payer: COMMERCIAL

## 2021-03-08 VITALS
BODY MASS INDEX: 42.06 KG/M2 | DIASTOLIC BLOOD PRESSURE: 80 MMHG | WEIGHT: 229.94 LBS | SYSTOLIC BLOOD PRESSURE: 132 MMHG

## 2021-03-08 DIAGNOSIS — Z3A.36 36 WEEKS GESTATION OF PREGNANCY: ICD-10-CM

## 2021-03-08 DIAGNOSIS — O99.213 OBESITY DURING PREGNANCY IN THIRD TRIMESTER: ICD-10-CM

## 2021-03-08 DIAGNOSIS — O99.013 ANEMIA DURING PREGNANCY IN THIRD TRIMESTER: Primary | ICD-10-CM

## 2021-03-08 PROBLEM — Z34.00 PRIMIGRAVIDA, ANTEPARTUM: Status: RESOLVED | Noted: 2021-01-05 | Resolved: 2021-03-08

## 2021-03-08 PROCEDURE — 76816 OB US FOLLOW-UP PER FETUS: CPT | Mod: S$GLB,,, | Performed by: OBSTETRICS & GYNECOLOGY

## 2021-03-08 PROCEDURE — 76816 PR  US,PREGNANT UTERUS,F/U,TRANSABD APP: ICD-10-PCS | Mod: S$GLB,,, | Performed by: OBSTETRICS & GYNECOLOGY

## 2021-03-08 PROCEDURE — 99999 PR PBB SHADOW E&M-EST. PATIENT-LVL III: CPT | Mod: PBBFAC,,, | Performed by: MIDWIFE

## 2021-03-08 PROCEDURE — 87081 CULTURE SCREEN ONLY: CPT | Performed by: MIDWIFE

## 2021-03-08 PROCEDURE — 0502F PR SUBSEQUENT PRENATAL CARE: ICD-10-PCS | Mod: CPTII,S$GLB,, | Performed by: MIDWIFE

## 2021-03-08 PROCEDURE — 0502F SUBSEQUENT PRENATAL CARE: CPT | Mod: CPTII,S$GLB,, | Performed by: MIDWIFE

## 2021-03-08 PROCEDURE — 76819 FETAL BIOPHYS PROFIL W/O NST: CPT | Mod: S$GLB,,, | Performed by: OBSTETRICS & GYNECOLOGY

## 2021-03-08 PROCEDURE — 76819 PR US, OB, FETAL BIOPHYSICAL, W/O NST: ICD-10-PCS | Mod: S$GLB,,, | Performed by: OBSTETRICS & GYNECOLOGY

## 2021-03-08 PROCEDURE — 99999 PR PBB SHADOW E&M-EST. PATIENT-LVL III: ICD-10-PCS | Mod: PBBFAC,,, | Performed by: MIDWIFE

## 2021-03-10 ENCOUNTER — HOSPITAL ENCOUNTER (OUTPATIENT)
Facility: HOSPITAL | Age: 26
Discharge: HOME | End: 2021-03-10
Attending: OBSTETRICS & GYNECOLOGY | Admitting: OBSTETRICS & GYNECOLOGY
Payer: COMMERCIAL

## 2021-03-10 VITALS
RESPIRATION RATE: 18 BRPM | OXYGEN SATURATION: 100 % | SYSTOLIC BLOOD PRESSURE: 123 MMHG | WEIGHT: 229 LBS | DIASTOLIC BLOOD PRESSURE: 78 MMHG | BODY MASS INDEX: 42.14 KG/M2 | TEMPERATURE: 99 F | HEIGHT: 62 IN | HEART RATE: 75 BPM

## 2021-03-10 DIAGNOSIS — R55 SYNCOPE, UNSPECIFIED SYNCOPE TYPE: ICD-10-CM

## 2021-03-10 DIAGNOSIS — R55 FAINTING: ICD-10-CM

## 2021-03-10 PROBLEM — O99.613 CONSTIPATION DURING PREGNANCY IN THIRD TRIMESTER: Status: RESOLVED | Noted: 2021-02-12 | Resolved: 2021-03-10

## 2021-03-10 PROBLEM — K59.00 CONSTIPATION DURING PREGNANCY IN THIRD TRIMESTER: Status: RESOLVED | Noted: 2021-02-12 | Resolved: 2021-03-10

## 2021-03-10 PROCEDURE — 99213 OFFICE O/P EST LOW 20 MIN: CPT | Mod: 25,,, | Performed by: MIDWIFE

## 2021-03-10 PROCEDURE — 99211 OFF/OP EST MAY X REQ PHY/QHP: CPT | Mod: 25

## 2021-03-10 PROCEDURE — 63600175 PHARM REV CODE 636 W HCPCS: Performed by: MIDWIFE

## 2021-03-10 PROCEDURE — 59025 FETAL NON-STRESS TEST: CPT

## 2021-03-10 PROCEDURE — 96360 HYDRATION IV INFUSION INIT: CPT

## 2021-03-10 PROCEDURE — 59025 FETAL NON-STRESS TEST: CPT | Mod: 26,,, | Performed by: MIDWIFE

## 2021-03-10 PROCEDURE — 25000003 PHARM REV CODE 250: Performed by: MIDWIFE

## 2021-03-10 PROCEDURE — 99213 PR OFFICE/OUTPT VISIT, EST, LEVL III, 20-29 MIN: ICD-10-PCS | Mod: 25,,, | Performed by: MIDWIFE

## 2021-03-10 PROCEDURE — 59025 OBTAIN FETAL NONSTRESS TEST (NST): ICD-10-PCS | Mod: 26,,, | Performed by: MIDWIFE

## 2021-03-10 RX ORDER — CYCLOBENZAPRINE HCL 10 MG
10 TABLET ORAL ONCE
Status: COMPLETED | OUTPATIENT
Start: 2021-03-10 | End: 2021-03-10

## 2021-03-10 RX ORDER — ONDANSETRON 8 MG/1
8 TABLET, ORALLY DISINTEGRATING ORAL EVERY 8 HOURS PRN
Status: DISCONTINUED | OUTPATIENT
Start: 2021-03-10 | End: 2021-03-10 | Stop reason: HOSPADM

## 2021-03-10 RX ORDER — ACETAMINOPHEN 500 MG
500 TABLET ORAL EVERY 6 HOURS PRN
Status: DISCONTINUED | OUTPATIENT
Start: 2021-03-10 | End: 2021-03-10 | Stop reason: HOSPADM

## 2021-03-10 RX ORDER — DOCUSATE SODIUM 100 MG/1
100 CAPSULE, LIQUID FILLED ORAL 2 TIMES DAILY
COMMUNITY
End: 2021-03-29

## 2021-03-10 RX ORDER — SODIUM CHLORIDE, SODIUM LACTATE, POTASSIUM CHLORIDE, CALCIUM CHLORIDE 600; 310; 30; 20 MG/100ML; MG/100ML; MG/100ML; MG/100ML
INJECTION, SOLUTION INTRAVENOUS CONTINUOUS
Status: DISCONTINUED | OUTPATIENT
Start: 2021-03-10 | End: 2021-03-10 | Stop reason: HOSPADM

## 2021-03-10 RX ORDER — PROCHLORPERAZINE EDISYLATE 5 MG/ML
5 INJECTION INTRAMUSCULAR; INTRAVENOUS EVERY 6 HOURS PRN
Status: DISCONTINUED | OUTPATIENT
Start: 2021-03-10 | End: 2021-03-10 | Stop reason: HOSPADM

## 2021-03-10 RX ADMIN — SODIUM CHLORIDE, SODIUM LACTATE, POTASSIUM CHLORIDE, AND CALCIUM CHLORIDE 1000 ML: .6; .31; .03; .02 INJECTION, SOLUTION INTRAVENOUS at 04:03

## 2021-03-10 RX ADMIN — SODIUM CHLORIDE, SODIUM LACTATE, POTASSIUM CHLORIDE, AND CALCIUM CHLORIDE: .6; .31; .03; .02 INJECTION, SOLUTION INTRAVENOUS at 05:03

## 2021-03-10 RX ADMIN — CYCLOBENZAPRINE 10 MG: 10 TABLET, FILM COATED ORAL at 05:03

## 2021-03-11 LAB — BACTERIA SPEC AEROBE CULT: NORMAL

## 2021-03-12 ENCOUNTER — HOSPITAL ENCOUNTER (OUTPATIENT)
Facility: HOSPITAL | Age: 26
Discharge: HOME | End: 2021-03-12
Attending: OBSTETRICS & GYNECOLOGY | Admitting: OBSTETRICS & GYNECOLOGY
Payer: COMMERCIAL

## 2021-03-12 VITALS
BODY MASS INDEX: 42.14 KG/M2 | HEART RATE: 68 BPM | WEIGHT: 229 LBS | HEIGHT: 62 IN | TEMPERATURE: 98 F | RESPIRATION RATE: 20 BRPM | SYSTOLIC BLOOD PRESSURE: 112 MMHG | OXYGEN SATURATION: 100 % | DIASTOLIC BLOOD PRESSURE: 71 MMHG

## 2021-03-12 DIAGNOSIS — O26.893 ABDOMINAL PAIN DURING PREGNANCY IN THIRD TRIMESTER: ICD-10-CM

## 2021-03-12 DIAGNOSIS — R10.9 ABDOMINAL PAIN DURING PREGNANCY IN THIRD TRIMESTER: ICD-10-CM

## 2021-03-12 PROCEDURE — 59025 FETAL NON-STRESS TEST: CPT

## 2021-03-12 PROCEDURE — 99213 PR OFFICE/OUTPT VISIT, EST, LEVL III, 20-29 MIN: ICD-10-PCS | Mod: 25,,, | Performed by: MIDWIFE

## 2021-03-12 PROCEDURE — 99213 OFFICE O/P EST LOW 20 MIN: CPT | Mod: 25,,, | Performed by: MIDWIFE

## 2021-03-12 PROCEDURE — 25000003 PHARM REV CODE 250: Performed by: MIDWIFE

## 2021-03-12 PROCEDURE — 59025 OBTAIN FETAL NONSTRESS TEST (NST): ICD-10-PCS | Mod: 26,,, | Performed by: MIDWIFE

## 2021-03-12 PROCEDURE — 59025 FETAL NON-STRESS TEST: CPT | Mod: 26,,, | Performed by: MIDWIFE

## 2021-03-12 PROCEDURE — 99211 OFF/OP EST MAY X REQ PHY/QHP: CPT | Mod: 25

## 2021-03-12 PROCEDURE — 63600175 PHARM REV CODE 636 W HCPCS: Performed by: MIDWIFE

## 2021-03-12 RX ORDER — PROCHLORPERAZINE EDISYLATE 5 MG/ML
5 INJECTION INTRAMUSCULAR; INTRAVENOUS EVERY 6 HOURS PRN
Status: DISCONTINUED | OUTPATIENT
Start: 2021-03-12 | End: 2021-03-12 | Stop reason: HOSPADM

## 2021-03-12 RX ORDER — ONDANSETRON 8 MG/1
8 TABLET, ORALLY DISINTEGRATING ORAL EVERY 8 HOURS PRN
Status: DISCONTINUED | OUTPATIENT
Start: 2021-03-12 | End: 2021-03-12 | Stop reason: HOSPADM

## 2021-03-12 RX ORDER — BUTORPHANOL TARTRATE 2 MG/ML
2 INJECTION INTRAMUSCULAR; INTRAVENOUS ONCE
Status: COMPLETED | OUTPATIENT
Start: 2021-03-12 | End: 2021-03-12

## 2021-03-12 RX ORDER — ACETAMINOPHEN 500 MG
500 TABLET ORAL EVERY 6 HOURS PRN
Status: DISCONTINUED | OUTPATIENT
Start: 2021-03-12 | End: 2021-03-12 | Stop reason: HOSPADM

## 2021-03-12 RX ADMIN — BUTORPHANOL TARTRATE 2 MG: 2 INJECTION, SOLUTION INTRAMUSCULAR; INTRAVENOUS at 04:03

## 2021-03-12 RX ADMIN — ONDANSETRON 8 MG: 8 TABLET, ORALLY DISINTEGRATING ORAL at 05:03

## 2021-03-15 ENCOUNTER — TELEPHONE (OUTPATIENT)
Dept: OBSTETRICS AND GYNECOLOGY | Facility: CLINIC | Age: 26
End: 2021-03-15

## 2021-03-15 ENCOUNTER — PATIENT MESSAGE (OUTPATIENT)
Dept: OBSTETRICS AND GYNECOLOGY | Facility: CLINIC | Age: 26
End: 2021-03-15

## 2021-03-15 ENCOUNTER — HOSPITAL ENCOUNTER (OUTPATIENT)
Facility: HOSPITAL | Age: 26
Discharge: HOME | End: 2021-03-15
Attending: OBSTETRICS & GYNECOLOGY | Admitting: OBSTETRICS & GYNECOLOGY
Payer: COMMERCIAL

## 2021-03-15 VITALS
RESPIRATION RATE: 17 BRPM | HEART RATE: 86 BPM | SYSTOLIC BLOOD PRESSURE: 117 MMHG | OXYGEN SATURATION: 99 % | DIASTOLIC BLOOD PRESSURE: 74 MMHG

## 2021-03-15 DIAGNOSIS — R10.2 PELVIC PAIN AFFECTING PREGNANCY IN THIRD TRIMESTER, ANTEPARTUM: ICD-10-CM

## 2021-03-15 DIAGNOSIS — O26.893 PELVIC PAIN AFFECTING PREGNANCY IN THIRD TRIMESTER, ANTEPARTUM: ICD-10-CM

## 2021-03-15 PROCEDURE — 99213 OFFICE O/P EST LOW 20 MIN: CPT | Mod: 25,,, | Performed by: MIDWIFE

## 2021-03-15 PROCEDURE — 59025 FETAL NON-STRESS TEST: CPT | Mod: 26,,, | Performed by: MIDWIFE

## 2021-03-15 PROCEDURE — 59025 FETAL NON-STRESS TEST: CPT

## 2021-03-15 PROCEDURE — 99211 OFF/OP EST MAY X REQ PHY/QHP: CPT | Mod: 25

## 2021-03-15 PROCEDURE — 99213 PR OFFICE/OUTPT VISIT, EST, LEVL III, 20-29 MIN: ICD-10-PCS | Mod: 25,,, | Performed by: MIDWIFE

## 2021-03-15 PROCEDURE — 63600175 PHARM REV CODE 636 W HCPCS: Performed by: MIDWIFE

## 2021-03-15 PROCEDURE — 59025 OBTAIN FETAL NONSTRESS TEST (NST): ICD-10-PCS | Mod: 26,,, | Performed by: MIDWIFE

## 2021-03-15 RX ORDER — HYDROXYZINE HYDROCHLORIDE 25 MG/ML
50 INJECTION, SOLUTION INTRAMUSCULAR ONCE
Status: COMPLETED | OUTPATIENT
Start: 2021-03-15 | End: 2021-03-15

## 2021-03-15 RX ORDER — PROCHLORPERAZINE EDISYLATE 5 MG/ML
5 INJECTION INTRAMUSCULAR; INTRAVENOUS EVERY 6 HOURS PRN
Status: DISCONTINUED | OUTPATIENT
Start: 2021-03-15 | End: 2021-03-15 | Stop reason: HOSPADM

## 2021-03-15 RX ORDER — CYCLOBENZAPRINE HCL 5 MG
5 TABLET ORAL 3 TIMES DAILY PRN
Qty: 10 TABLET | Refills: 0 | Status: ON HOLD | OUTPATIENT
Start: 2021-03-15 | End: 2021-03-24 | Stop reason: HOSPADM

## 2021-03-15 RX ORDER — MORPHINE SULFATE 10 MG/ML
10 INJECTION INTRAMUSCULAR; INTRAVENOUS; SUBCUTANEOUS ONCE
Status: COMPLETED | OUTPATIENT
Start: 2021-03-15 | End: 2021-03-15

## 2021-03-15 RX ORDER — ONDANSETRON 8 MG/1
8 TABLET, ORALLY DISINTEGRATING ORAL EVERY 8 HOURS PRN
Status: DISCONTINUED | OUTPATIENT
Start: 2021-03-15 | End: 2021-03-15 | Stop reason: HOSPADM

## 2021-03-15 RX ORDER — SODIUM CHLORIDE 9 MG/ML
INJECTION, SOLUTION INTRAVENOUS CONTINUOUS
Status: DISCONTINUED | OUTPATIENT
Start: 2021-03-15 | End: 2021-03-15 | Stop reason: HOSPADM

## 2021-03-15 RX ORDER — HYDROXYZINE PAMOATE 25 MG/1
50 CAPSULE ORAL ONCE
Status: DISCONTINUED | OUTPATIENT
Start: 2021-03-15 | End: 2021-03-15

## 2021-03-15 RX ORDER — ACETAMINOPHEN 500 MG
500 TABLET ORAL EVERY 6 HOURS PRN
Status: DISCONTINUED | OUTPATIENT
Start: 2021-03-15 | End: 2021-03-15 | Stop reason: HOSPADM

## 2021-03-15 RX ADMIN — HYDROXYZINE HYDROCHLORIDE 50 MG: 25 INJECTION, SOLUTION INTRAMUSCULAR at 03:03

## 2021-03-15 RX ADMIN — MORPHINE SULFATE 10 MG: 10 INJECTION INTRAVENOUS at 03:03

## 2021-03-16 ENCOUNTER — PROCEDURE VISIT (OUTPATIENT)
Dept: OBSTETRICS AND GYNECOLOGY | Facility: CLINIC | Age: 26
End: 2021-03-16
Payer: COMMERCIAL

## 2021-03-16 ENCOUNTER — ROUTINE PRENATAL (OUTPATIENT)
Dept: OBSTETRICS AND GYNECOLOGY | Facility: CLINIC | Age: 26
End: 2021-03-16
Payer: COMMERCIAL

## 2021-03-16 VITALS
SYSTOLIC BLOOD PRESSURE: 114 MMHG | BODY MASS INDEX: 42.86 KG/M2 | DIASTOLIC BLOOD PRESSURE: 76 MMHG | WEIGHT: 234.38 LBS

## 2021-03-16 DIAGNOSIS — O99.213 OBESITY AFFECTING PREGNANCY IN THIRD TRIMESTER: ICD-10-CM

## 2021-03-16 DIAGNOSIS — R10.2 PELVIC PAIN AFFECTING PREGNANCY IN THIRD TRIMESTER, ANTEPARTUM: Primary | ICD-10-CM

## 2021-03-16 DIAGNOSIS — O99.340 DEPRESSION AFFECTING PREGNANCY: ICD-10-CM

## 2021-03-16 DIAGNOSIS — N76.0 BACTERIAL VAGINOSIS: ICD-10-CM

## 2021-03-16 DIAGNOSIS — O99.013 ANEMIA DURING PREGNANCY IN THIRD TRIMESTER: ICD-10-CM

## 2021-03-16 DIAGNOSIS — O26.893 PELVIC PAIN AFFECTING PREGNANCY IN THIRD TRIMESTER, ANTEPARTUM: ICD-10-CM

## 2021-03-16 DIAGNOSIS — O26.893 PELVIC PAIN AFFECTING PREGNANCY IN THIRD TRIMESTER, ANTEPARTUM: Primary | ICD-10-CM

## 2021-03-16 DIAGNOSIS — R10.2 PELVIC PAIN AFFECTING PREGNANCY IN THIRD TRIMESTER, ANTEPARTUM: ICD-10-CM

## 2021-03-16 DIAGNOSIS — O99.213 OBESITY DURING PREGNANCY IN THIRD TRIMESTER: ICD-10-CM

## 2021-03-16 DIAGNOSIS — B96.89 BACTERIAL VAGINOSIS: ICD-10-CM

## 2021-03-16 DIAGNOSIS — F32.A DEPRESSION AFFECTING PREGNANCY: ICD-10-CM

## 2021-03-16 PROCEDURE — 99999 PR PBB SHADOW E&M-EST. PATIENT-LVL III: CPT | Mod: PBBFAC,,, | Performed by: OBSTETRICS & GYNECOLOGY

## 2021-03-16 PROCEDURE — 0502F SUBSEQUENT PRENATAL CARE: CPT | Mod: CPTII,S$GLB,, | Performed by: OBSTETRICS & GYNECOLOGY

## 2021-03-16 PROCEDURE — 87210 PR  SMEAR,STAIN,WET MNT,INTERP: ICD-10-PCS | Mod: QW,S$GLB,, | Performed by: OBSTETRICS & GYNECOLOGY

## 2021-03-16 PROCEDURE — 76819 FETAL BIOPHYS PROFIL W/O NST: CPT | Mod: S$GLB,,, | Performed by: OBSTETRICS & GYNECOLOGY

## 2021-03-16 PROCEDURE — 99999 PR PBB SHADOW E&M-EST. PATIENT-LVL III: ICD-10-PCS | Mod: PBBFAC,,, | Performed by: OBSTETRICS & GYNECOLOGY

## 2021-03-16 PROCEDURE — 76819 PR US, OB, FETAL BIOPHYSICAL, W/O NST: ICD-10-PCS | Mod: S$GLB,,, | Performed by: OBSTETRICS & GYNECOLOGY

## 2021-03-16 PROCEDURE — 0502F PR SUBSEQUENT PRENATAL CARE: ICD-10-PCS | Mod: CPTII,S$GLB,, | Performed by: OBSTETRICS & GYNECOLOGY

## 2021-03-16 PROCEDURE — 87210 SMEAR WET MOUNT SALINE/INK: CPT | Mod: QW,S$GLB,, | Performed by: OBSTETRICS & GYNECOLOGY

## 2021-03-16 RX ORDER — METRONIDAZOLE 500 MG/1
500 TABLET ORAL 2 TIMES DAILY
Qty: 14 TABLET | Refills: 0 | Status: SHIPPED | OUTPATIENT
Start: 2021-03-16 | End: 2021-03-23

## 2021-03-19 ENCOUNTER — HOSPITAL ENCOUNTER (OUTPATIENT)
Facility: HOSPITAL | Age: 26
Discharge: HOME | End: 2021-03-19
Attending: OBSTETRICS & GYNECOLOGY | Admitting: OBSTETRICS & GYNECOLOGY
Payer: COMMERCIAL

## 2021-03-19 VITALS
TEMPERATURE: 98 F | SYSTOLIC BLOOD PRESSURE: 115 MMHG | DIASTOLIC BLOOD PRESSURE: 64 MMHG | RESPIRATION RATE: 18 BRPM | OXYGEN SATURATION: 97 % | HEART RATE: 74 BPM

## 2021-03-19 DIAGNOSIS — O47.9 THREATENED LABOR AT TERM: ICD-10-CM

## 2021-03-19 PROCEDURE — 96372 THER/PROPH/DIAG INJ SC/IM: CPT

## 2021-03-19 PROCEDURE — 99211 OFF/OP EST MAY X REQ PHY/QHP: CPT | Mod: 25

## 2021-03-19 PROCEDURE — 99213 OFFICE O/P EST LOW 20 MIN: CPT | Mod: 25,,, | Performed by: MIDWIFE

## 2021-03-19 PROCEDURE — 59025 FETAL NON-STRESS TEST: CPT

## 2021-03-19 PROCEDURE — 99213 PR OFFICE/OUTPT VISIT, EST, LEVL III, 20-29 MIN: ICD-10-PCS | Mod: 25,,, | Performed by: MIDWIFE

## 2021-03-19 PROCEDURE — 59025 FETAL NON-STRESS TEST: CPT | Mod: 26,,, | Performed by: MIDWIFE

## 2021-03-19 PROCEDURE — 63600175 PHARM REV CODE 636 W HCPCS: Performed by: MIDWIFE

## 2021-03-19 PROCEDURE — 59025 OBTAIN FETAL NONSTRESS TEST (NST): ICD-10-PCS | Mod: 26,,, | Performed by: MIDWIFE

## 2021-03-19 PROCEDURE — 25000003 PHARM REV CODE 250: Performed by: MIDWIFE

## 2021-03-19 RX ORDER — HYDROXYZINE PAMOATE 25 MG/1
50 CAPSULE ORAL ONCE
Status: COMPLETED | OUTPATIENT
Start: 2021-03-19 | End: 2021-03-19

## 2021-03-19 RX ORDER — MORPHINE SULFATE 10 MG/ML
10 INJECTION INTRAMUSCULAR; INTRAVENOUS; SUBCUTANEOUS ONCE
Status: COMPLETED | OUTPATIENT
Start: 2021-03-19 | End: 2021-03-19

## 2021-03-19 RX ORDER — ONDANSETRON 8 MG/1
8 TABLET, ORALLY DISINTEGRATING ORAL EVERY 8 HOURS PRN
Status: DISCONTINUED | OUTPATIENT
Start: 2021-03-19 | End: 2021-03-20 | Stop reason: HOSPADM

## 2021-03-19 RX ORDER — PROCHLORPERAZINE EDISYLATE 5 MG/ML
5 INJECTION INTRAMUSCULAR; INTRAVENOUS EVERY 6 HOURS PRN
Status: DISCONTINUED | OUTPATIENT
Start: 2021-03-19 | End: 2021-03-20 | Stop reason: HOSPADM

## 2021-03-19 RX ORDER — ACETAMINOPHEN 500 MG
500 TABLET ORAL EVERY 6 HOURS PRN
Status: DISCONTINUED | OUTPATIENT
Start: 2021-03-19 | End: 2021-03-20 | Stop reason: HOSPADM

## 2021-03-19 RX ORDER — SODIUM CHLORIDE 9 MG/ML
INJECTION, SOLUTION INTRAVENOUS CONTINUOUS
Status: DISCONTINUED | OUTPATIENT
Start: 2021-03-19 | End: 2021-03-20 | Stop reason: HOSPADM

## 2021-03-19 RX ADMIN — MORPHINE SULFATE 10 MG: 10 INJECTION INTRAVENOUS at 10:03

## 2021-03-19 RX ADMIN — HYDROXYZINE PAMOATE 50 MG: 25 CAPSULE ORAL at 10:03

## 2021-03-22 ENCOUNTER — ANESTHESIA (OUTPATIENT)
Dept: OBSTETRICS AND GYNECOLOGY | Facility: HOSPITAL | Age: 26
End: 2021-03-22
Payer: COMMERCIAL

## 2021-03-22 ENCOUNTER — HOSPITAL ENCOUNTER (INPATIENT)
Facility: HOSPITAL | Age: 26
LOS: 2 days | Discharge: HOME | End: 2021-03-24
Attending: OBSTETRICS & GYNECOLOGY | Admitting: OBSTETRICS & GYNECOLOGY
Payer: COMMERCIAL

## 2021-03-22 ENCOUNTER — ANESTHESIA EVENT (OUTPATIENT)
Dept: OBSTETRICS AND GYNECOLOGY | Facility: HOSPITAL | Age: 26
End: 2021-03-22
Payer: COMMERCIAL

## 2021-03-22 ENCOUNTER — ROUTINE PRENATAL (OUTPATIENT)
Dept: OBSTETRICS AND GYNECOLOGY | Facility: CLINIC | Age: 26
End: 2021-03-22
Payer: COMMERCIAL

## 2021-03-22 VITALS
DIASTOLIC BLOOD PRESSURE: 70 MMHG | SYSTOLIC BLOOD PRESSURE: 110 MMHG | BODY MASS INDEX: 42.14 KG/M2 | WEIGHT: 230.38 LBS

## 2021-03-22 DIAGNOSIS — F32.A DEPRESSION AFFECTING PREGNANCY: ICD-10-CM

## 2021-03-22 DIAGNOSIS — O99.340 DEPRESSION AFFECTING PREGNANCY: ICD-10-CM

## 2021-03-22 DIAGNOSIS — O26.893 PELVIC PAIN AFFECTING PREGNANCY IN THIRD TRIMESTER, ANTEPARTUM: Primary | ICD-10-CM

## 2021-03-22 DIAGNOSIS — O42.90 AMNIOTIC FLUID LEAKING: ICD-10-CM

## 2021-03-22 DIAGNOSIS — R10.2 PELVIC PAIN AFFECTING PREGNANCY IN THIRD TRIMESTER, ANTEPARTUM: Primary | ICD-10-CM

## 2021-03-22 DIAGNOSIS — O99.013 ANEMIA DURING PREGNANCY IN THIRD TRIMESTER: ICD-10-CM

## 2021-03-22 DIAGNOSIS — O99.213 OBESITY DURING PREGNANCY IN THIRD TRIMESTER: ICD-10-CM

## 2021-03-22 PROBLEM — O47.9 THREATENED LABOR AT TERM: Status: RESOLVED | Noted: 2021-03-19 | Resolved: 2021-03-22

## 2021-03-22 PROBLEM — R10.9 ABDOMINAL PAIN DURING PREGNANCY IN THIRD TRIMESTER: Status: RESOLVED | Noted: 2021-03-12 | Resolved: 2021-03-22

## 2021-03-22 LAB
ABO + RH BLD: NORMAL
ALBUMIN SERPL BCP-MCNC: 3.2 G/DL (ref 3.5–5.2)
ALP SERPL-CCNC: 130 U/L (ref 55–135)
ALT SERPL W/O P-5'-P-CCNC: 7 U/L (ref 10–44)
ANION GAP SERPL CALC-SCNC: 13 MMOL/L (ref 8–16)
AST SERPL-CCNC: 12 U/L (ref 10–40)
BASOPHILS # BLD AUTO: 0.04 K/UL (ref 0–0.2)
BASOPHILS NFR BLD: 0.4 % (ref 0–1.9)
BILIRUB SERPL-MCNC: 0.2 MG/DL (ref 0.1–1)
BLD GP AB SCN CELLS X3 SERPL QL: NORMAL
BUN SERPL-MCNC: 4 MG/DL (ref 6–20)
CALCIUM SERPL-MCNC: 9.1 MG/DL (ref 8.7–10.5)
CHLORIDE SERPL-SCNC: 108 MMOL/L (ref 95–110)
CO2 SERPL-SCNC: 17 MMOL/L (ref 23–29)
CREAT SERPL-MCNC: 0.6 MG/DL (ref 0.5–1.4)
CREAT UR-MCNC: 20.1 MG/DL (ref 15–325)
DIFFERENTIAL METHOD: ABNORMAL
EOSINOPHIL # BLD AUTO: 0.1 K/UL (ref 0–0.5)
EOSINOPHIL NFR BLD: 0.9 % (ref 0–8)
ERYTHROCYTE [DISTWIDTH] IN BLOOD BY AUTOMATED COUNT: 12.3 % (ref 11.5–14.5)
EST. GFR  (AFRICAN AMERICAN): >60 ML/MIN/1.73 M^2
EST. GFR  (NON AFRICAN AMERICAN): >60 ML/MIN/1.73 M^2
GLUCOSE SERPL-MCNC: 85 MG/DL (ref 70–110)
HCT VFR BLD AUTO: 33.3 % (ref 37–48.5)
HGB BLD-MCNC: 11 G/DL (ref 12–16)
IMM GRANULOCYTES # BLD AUTO: 0.11 K/UL (ref 0–0.04)
IMM GRANULOCYTES NFR BLD AUTO: 1.2 % (ref 0–0.5)
LYMPHOCYTES # BLD AUTO: 1.6 K/UL (ref 1–4.8)
LYMPHOCYTES NFR BLD: 17.2 % (ref 18–48)
MCH RBC QN AUTO: 30.7 PG (ref 27–31)
MCHC RBC AUTO-ENTMCNC: 33 G/DL (ref 32–36)
MCV RBC AUTO: 93 FL (ref 82–98)
MONOCYTES # BLD AUTO: 0.5 K/UL (ref 0.3–1)
MONOCYTES NFR BLD: 5.4 % (ref 4–15)
NEUTROPHILS # BLD AUTO: 7.2 K/UL (ref 1.8–7.7)
NEUTROPHILS NFR BLD: 74.9 % (ref 38–73)
NRBC BLD-RTO: 0 /100 WBC
PLATELET # BLD AUTO: 237 K/UL (ref 150–350)
PMV BLD AUTO: 12.2 FL (ref 9.2–12.9)
POTASSIUM SERPL-SCNC: 3.9 MMOL/L (ref 3.5–5.1)
PROT SERPL-MCNC: 6.7 G/DL (ref 6–8.4)
PROT UR-MCNC: 49 MG/DL (ref 0–15)
PROT/CREAT UR: 2.44 MG/G{CREAT} (ref 0–0.2)
RBC # BLD AUTO: 3.58 M/UL (ref 4–5.4)
SARS-COV-2 RDRP RESP QL NAA+PROBE: NEGATIVE
SODIUM SERPL-SCNC: 138 MMOL/L (ref 136–145)
WBC # BLD AUTO: 9.55 K/UL (ref 3.9–12.7)

## 2021-03-22 PROCEDURE — 99999 PR PBB SHADOW E&M-EST. PATIENT-LVL III: ICD-10-PCS | Mod: PBBFAC,,, | Performed by: OBSTETRICS & GYNECOLOGY

## 2021-03-22 PROCEDURE — 11000001 HC ACUTE MED/SURG PRIVATE ROOM

## 2021-03-22 PROCEDURE — 85025 COMPLETE CBC W/AUTO DIFF WBC: CPT | Performed by: ADVANCED PRACTICE MIDWIFE

## 2021-03-22 PROCEDURE — 84156 ASSAY OF PROTEIN URINE: CPT | Performed by: ADVANCED PRACTICE MIDWIFE

## 2021-03-22 PROCEDURE — 51702 INSERT TEMP BLADDER CATH: CPT

## 2021-03-22 PROCEDURE — 0502F PR SUBSEQUENT PRENATAL CARE: ICD-10-PCS | Mod: CPTII,S$GLB,, | Performed by: OBSTETRICS & GYNECOLOGY

## 2021-03-22 PROCEDURE — 86900 BLOOD TYPING SEROLOGIC ABO: CPT | Performed by: ADVANCED PRACTICE MIDWIFE

## 2021-03-22 PROCEDURE — 80053 COMPREHEN METABOLIC PANEL: CPT | Performed by: ADVANCED PRACTICE MIDWIFE

## 2021-03-22 PROCEDURE — 25000003 PHARM REV CODE 250: Performed by: ADVANCED PRACTICE MIDWIFE

## 2021-03-22 PROCEDURE — 25000003 PHARM REV CODE 250: Performed by: NURSE ANESTHETIST, CERTIFIED REGISTERED

## 2021-03-22 PROCEDURE — 63600175 PHARM REV CODE 636 W HCPCS: Performed by: ADVANCED PRACTICE MIDWIFE

## 2021-03-22 PROCEDURE — 62326 NJX INTERLAMINAR LMBR/SAC: CPT | Performed by: NURSE ANESTHETIST, CERTIFIED REGISTERED

## 2021-03-22 PROCEDURE — 72100002 HC LABOR CARE, 1ST 8 HOURS

## 2021-03-22 PROCEDURE — 99999 PR PBB SHADOW E&M-EST. PATIENT-LVL III: CPT | Mod: PBBFAC,,, | Performed by: OBSTETRICS & GYNECOLOGY

## 2021-03-22 PROCEDURE — 27200710 HC EPIDURAL INFUSION PUMP SET: Performed by: ANESTHESIOLOGY

## 2021-03-22 PROCEDURE — 27800516 HC TRAY, EPIDURAL COMBO: Performed by: ANESTHESIOLOGY

## 2021-03-22 PROCEDURE — U0002 COVID-19 LAB TEST NON-CDC: HCPCS | Performed by: ADVANCED PRACTICE MIDWIFE

## 2021-03-22 PROCEDURE — 63600175 PHARM REV CODE 636 W HCPCS: Performed by: NURSE ANESTHETIST, CERTIFIED REGISTERED

## 2021-03-22 PROCEDURE — 0502F SUBSEQUENT PRENATAL CARE: CPT | Mod: CPTII,S$GLB,, | Performed by: OBSTETRICS & GYNECOLOGY

## 2021-03-22 RX ORDER — ONDANSETRON 8 MG/1
8 TABLET, ORALLY DISINTEGRATING ORAL EVERY 8 HOURS PRN
Status: DISCONTINUED | OUTPATIENT
Start: 2021-03-22 | End: 2021-03-23

## 2021-03-22 RX ORDER — BUTORPHANOL TARTRATE 2 MG/ML
2 INJECTION INTRAMUSCULAR; INTRAVENOUS
Status: DISCONTINUED | OUTPATIENT
Start: 2021-03-22 | End: 2021-03-23

## 2021-03-22 RX ORDER — PROCHLORPERAZINE EDISYLATE 5 MG/ML
5 INJECTION INTRAMUSCULAR; INTRAVENOUS EVERY 6 HOURS PRN
Status: DISCONTINUED | OUTPATIENT
Start: 2021-03-22 | End: 2021-03-23

## 2021-03-22 RX ORDER — OXYTOCIN/RINGER'S LACTATE 30/500 ML
0-30 PLASTIC BAG, INJECTION (ML) INTRAVENOUS CONTINUOUS
Status: DISCONTINUED | OUTPATIENT
Start: 2021-03-22 | End: 2021-03-23

## 2021-03-22 RX ORDER — LIDOCAINE HYDROCHLORIDE AND EPINEPHRINE 15; 5 MG/ML; UG/ML
INJECTION, SOLUTION EPIDURAL
Status: DISCONTINUED | OUTPATIENT
Start: 2021-03-22 | End: 2021-03-23

## 2021-03-22 RX ORDER — OXYTOCIN/RINGER'S LACTATE 30/500 ML
334 PLASTIC BAG, INJECTION (ML) INTRAVENOUS ONCE
Status: DISCONTINUED | OUTPATIENT
Start: 2021-03-22 | End: 2021-03-23

## 2021-03-22 RX ORDER — EPHEDRINE SULFATE 50 MG/ML
INJECTION, SOLUTION INTRAVENOUS
Status: DISCONTINUED | OUTPATIENT
Start: 2021-03-22 | End: 2021-03-23

## 2021-03-22 RX ORDER — SODIUM CHLORIDE, SODIUM LACTATE, POTASSIUM CHLORIDE, CALCIUM CHLORIDE 600; 310; 30; 20 MG/100ML; MG/100ML; MG/100ML; MG/100ML
INJECTION, SOLUTION INTRAVENOUS CONTINUOUS
Status: DISCONTINUED | OUTPATIENT
Start: 2021-03-22 | End: 2021-03-23

## 2021-03-22 RX ORDER — ROPIVACAINE HYDROCHLORIDE 2 MG/ML
INJECTION, SOLUTION EPIDURAL; INFILTRATION; PERINEURAL
Status: DISCONTINUED | OUTPATIENT
Start: 2021-03-22 | End: 2021-03-23

## 2021-03-22 RX ORDER — OXYTOCIN/RINGER'S LACTATE 30/500 ML
95 PLASTIC BAG, INJECTION (ML) INTRAVENOUS ONCE
Status: DISCONTINUED | OUTPATIENT
Start: 2021-03-22 | End: 2021-03-23

## 2021-03-22 RX ORDER — ROPIVACAINE HYDROCHLORIDE 2 MG/ML
INJECTION, SOLUTION EPIDURAL; INFILTRATION; PERINEURAL CONTINUOUS PRN
Status: DISCONTINUED | OUTPATIENT
Start: 2021-03-22 | End: 2021-03-23

## 2021-03-22 RX ORDER — BUTORPHANOL TARTRATE 2 MG/ML
1 INJECTION INTRAMUSCULAR; INTRAVENOUS
Status: DISCONTINUED | OUTPATIENT
Start: 2021-03-22 | End: 2021-03-23

## 2021-03-22 RX ORDER — SIMETHICONE 80 MG
1 TABLET,CHEWABLE ORAL 4 TIMES DAILY PRN
Status: DISCONTINUED | OUTPATIENT
Start: 2021-03-22 | End: 2021-03-23

## 2021-03-22 RX ORDER — CALCIUM CARBONATE 200(500)MG
500 TABLET,CHEWABLE ORAL 3 TIMES DAILY PRN
Status: DISCONTINUED | OUTPATIENT
Start: 2021-03-22 | End: 2021-03-23

## 2021-03-22 RX ADMIN — EPHEDRINE SULFATE 30 MG: 50 INJECTION INTRAVENOUS at 10:03

## 2021-03-22 RX ADMIN — ROPIVACAINE HYDROCHLORIDE 4 ML: 2 INJECTION, SOLUTION EPIDURAL; INFILTRATION at 09:03

## 2021-03-22 RX ADMIN — SODIUM CHLORIDE, SODIUM LACTATE, POTASSIUM CHLORIDE, AND CALCIUM CHLORIDE: .6; .31; .03; .02 INJECTION, SOLUTION INTRAVENOUS at 07:03

## 2021-03-22 RX ADMIN — LIDOCAINE HYDROCHLORIDE,EPINEPHRINE BITARTRATE 3 ML: 15; .005 INJECTION, SOLUTION EPIDURAL; INFILTRATION; INTRACAUDAL; PERINEURAL at 09:03

## 2021-03-22 RX ADMIN — Medication 2 MILLI-UNITS/MIN: at 07:03

## 2021-03-22 RX ADMIN — ONDANSETRON 8 MG: 8 TABLET, ORALLY DISINTEGRATING ORAL at 10:03

## 2021-03-22 RX ADMIN — EPHEDRINE SULFATE 20 MG: 50 INJECTION INTRAVENOUS at 10:03

## 2021-03-22 RX ADMIN — ROPIVACAINE HYDROCHLORIDE 14 ML/HR: 2 INJECTION, SOLUTION EPIDURAL; INFILTRATION at 09:03

## 2021-03-23 PROCEDURE — 72200005 HC VAGINAL DELIVERY LEVEL II

## 2021-03-23 PROCEDURE — 25000003 PHARM REV CODE 250: Performed by: ADVANCED PRACTICE MIDWIFE

## 2021-03-23 PROCEDURE — 11000001 HC ACUTE MED/SURG PRIVATE ROOM

## 2021-03-23 PROCEDURE — 59400 OBSTETRICAL CARE: CPT | Mod: AT,,, | Performed by: ADVANCED PRACTICE MIDWIFE

## 2021-03-23 PROCEDURE — 59400 PR FULL ROUT OBSTE CARE,VAGINAL DELIV: ICD-10-PCS | Mod: AT,,, | Performed by: ADVANCED PRACTICE MIDWIFE

## 2021-03-23 RX ORDER — OXYTOCIN/RINGER'S LACTATE 30/500 ML
95 PLASTIC BAG, INJECTION (ML) INTRAVENOUS ONCE
Status: DISCONTINUED | OUTPATIENT
Start: 2021-03-23 | End: 2021-03-24 | Stop reason: HOSPADM

## 2021-03-23 RX ORDER — ACETAMINOPHEN 325 MG/1
650 TABLET ORAL EVERY 6 HOURS PRN
Status: DISCONTINUED | OUTPATIENT
Start: 2021-03-23 | End: 2021-03-24 | Stop reason: HOSPADM

## 2021-03-23 RX ORDER — DIPHENHYDRAMINE HYDROCHLORIDE 50 MG/ML
25 INJECTION INTRAMUSCULAR; INTRAVENOUS EVERY 4 HOURS PRN
Status: DISCONTINUED | OUTPATIENT
Start: 2021-03-23 | End: 2021-03-24 | Stop reason: HOSPADM

## 2021-03-23 RX ORDER — ONDANSETRON 8 MG/1
8 TABLET, ORALLY DISINTEGRATING ORAL EVERY 8 HOURS PRN
Status: DISCONTINUED | OUTPATIENT
Start: 2021-03-23 | End: 2021-03-24 | Stop reason: HOSPADM

## 2021-03-23 RX ORDER — IBUPROFEN 600 MG/1
600 TABLET ORAL EVERY 6 HOURS
Status: DISCONTINUED | OUTPATIENT
Start: 2021-03-23 | End: 2021-03-24 | Stop reason: HOSPADM

## 2021-03-23 RX ORDER — HYDROCORTISONE 25 MG/G
CREAM TOPICAL 3 TIMES DAILY PRN
Status: DISCONTINUED | OUTPATIENT
Start: 2021-03-23 | End: 2021-03-24 | Stop reason: HOSPADM

## 2021-03-23 RX ORDER — DOCUSATE SODIUM 100 MG/1
200 CAPSULE, LIQUID FILLED ORAL 2 TIMES DAILY PRN
Status: DISCONTINUED | OUTPATIENT
Start: 2021-03-23 | End: 2021-03-24 | Stop reason: HOSPADM

## 2021-03-23 RX ORDER — HYDROCODONE BITARTRATE AND ACETAMINOPHEN 5; 325 MG/1; MG/1
1 TABLET ORAL EVERY 4 HOURS PRN
Status: DISCONTINUED | OUTPATIENT
Start: 2021-03-23 | End: 2021-03-24 | Stop reason: HOSPADM

## 2021-03-23 RX ORDER — PRENATAL WITH FERROUS FUM AND FOLIC ACID 3080; 920; 120; 400; 22; 1.84; 3; 20; 10; 1; 12; 200; 27; 25; 2 [IU]/1; [IU]/1; MG/1; [IU]/1; MG/1; MG/1; MG/1; MG/1; MG/1; MG/1; UG/1; MG/1; MG/1; MG/1; MG/1
1 TABLET ORAL DAILY
Status: DISCONTINUED | OUTPATIENT
Start: 2021-03-23 | End: 2021-03-24 | Stop reason: HOSPADM

## 2021-03-23 RX ORDER — MONTELUKAST SODIUM 10 MG/1
10 TABLET ORAL NIGHTLY
Status: DISCONTINUED | OUTPATIENT
Start: 2021-03-23 | End: 2021-03-24 | Stop reason: HOSPADM

## 2021-03-23 RX ORDER — PROCHLORPERAZINE EDISYLATE 5 MG/ML
5 INJECTION INTRAMUSCULAR; INTRAVENOUS EVERY 6 HOURS PRN
Status: DISCONTINUED | OUTPATIENT
Start: 2021-03-23 | End: 2021-03-24 | Stop reason: HOSPADM

## 2021-03-23 RX ORDER — DIPHENHYDRAMINE HCL 25 MG
25 CAPSULE ORAL EVERY 4 HOURS PRN
Status: DISCONTINUED | OUTPATIENT
Start: 2021-03-23 | End: 2021-03-24 | Stop reason: HOSPADM

## 2021-03-23 RX ORDER — SIMETHICONE 80 MG
1 TABLET,CHEWABLE ORAL EVERY 6 HOURS PRN
Status: DISCONTINUED | OUTPATIENT
Start: 2021-03-23 | End: 2021-03-24 | Stop reason: HOSPADM

## 2021-03-23 RX ORDER — SERTRALINE HYDROCHLORIDE 25 MG/1
25 TABLET, FILM COATED ORAL DAILY
Status: DISCONTINUED | OUTPATIENT
Start: 2021-03-23 | End: 2021-03-24 | Stop reason: HOSPADM

## 2021-03-23 RX ADMIN — PRENATAL VITAMINS-IRON FUMARATE 27 MG IRON-FOLIC ACID 0.8 MG TABLET 1 TABLET: at 08:03

## 2021-03-23 RX ADMIN — MONTELUKAST SODIUM 10 MG: 10 TABLET, COATED ORAL at 08:03

## 2021-03-23 RX ADMIN — HYDROCODONE BITARTRATE AND ACETAMINOPHEN 1 TABLET: 5; 325 TABLET ORAL at 05:03

## 2021-03-23 RX ADMIN — IBUPROFEN 600 MG: 600 TABLET, FILM COATED ORAL at 11:03

## 2021-03-23 RX ADMIN — HYDROCODONE BITARTRATE AND ACETAMINOPHEN 1 TABLET: 5; 325 TABLET ORAL at 10:03

## 2021-03-23 RX ADMIN — IBUPROFEN 600 MG: 600 TABLET, FILM COATED ORAL at 06:03

## 2021-03-23 RX ADMIN — HYDROCODONE BITARTRATE AND ACETAMINOPHEN 1 TABLET: 5; 325 TABLET ORAL at 08:03

## 2021-03-23 RX ADMIN — IBUPROFEN 600 MG: 600 TABLET, FILM COATED ORAL at 12:03

## 2021-03-23 RX ADMIN — IBUPROFEN 600 MG: 600 TABLET, FILM COATED ORAL at 05:03

## 2021-03-23 RX ADMIN — SERTRALINE HYDROCHLORIDE 25 MG: 25 TABLET ORAL at 08:03

## 2021-03-24 VITALS
WEIGHT: 230 LBS | RESPIRATION RATE: 18 BRPM | HEIGHT: 62 IN | DIASTOLIC BLOOD PRESSURE: 76 MMHG | SYSTOLIC BLOOD PRESSURE: 132 MMHG | OXYGEN SATURATION: 100 % | BODY MASS INDEX: 42.33 KG/M2 | HEART RATE: 65 BPM | TEMPERATURE: 98 F

## 2021-03-24 PROBLEM — B96.89 BACTERIAL VAGINOSIS: Status: RESOLVED | Noted: 2020-11-17 | Resolved: 2021-03-24

## 2021-03-24 PROBLEM — N76.0 BACTERIAL VAGINOSIS: Status: RESOLVED | Noted: 2020-11-17 | Resolved: 2021-03-24

## 2021-03-24 PROCEDURE — 25000003 PHARM REV CODE 250: Performed by: ADVANCED PRACTICE MIDWIFE

## 2021-03-24 RX ADMIN — SERTRALINE HYDROCHLORIDE 25 MG: 25 TABLET ORAL at 08:03

## 2021-03-24 RX ADMIN — PRENATAL VITAMINS-IRON FUMARATE 27 MG IRON-FOLIC ACID 0.8 MG TABLET 1 TABLET: at 08:03

## 2021-03-24 RX ADMIN — IBUPROFEN 600 MG: 600 TABLET, FILM COATED ORAL at 11:03

## 2021-03-24 RX ADMIN — DOCUSATE SODIUM 200 MG: 100 CAPSULE, LIQUID FILLED ORAL at 09:03

## 2021-03-24 RX ADMIN — IBUPROFEN 600 MG: 600 TABLET, FILM COATED ORAL at 05:03

## 2021-03-29 ENCOUNTER — POSTPARTUM VISIT (OUTPATIENT)
Dept: OBSTETRICS AND GYNECOLOGY | Facility: CLINIC | Age: 26
End: 2021-03-29
Payer: COMMERCIAL

## 2021-03-29 VITALS
WEIGHT: 222 LBS | BODY MASS INDEX: 40.85 KG/M2 | DIASTOLIC BLOOD PRESSURE: 76 MMHG | SYSTOLIC BLOOD PRESSURE: 120 MMHG | HEIGHT: 62 IN

## 2021-03-29 PROCEDURE — 0503F PR POSTPARTUM CARE VISIT: ICD-10-PCS | Mod: S$GLB,,, | Performed by: MIDWIFE

## 2021-03-29 PROCEDURE — 0503F POSTPARTUM CARE VISIT: CPT | Mod: S$GLB,,, | Performed by: MIDWIFE

## 2021-03-29 PROCEDURE — 99999 PR PBB SHADOW E&M-EST. PATIENT-LVL II: CPT | Mod: PBBFAC,,, | Performed by: MIDWIFE

## 2021-03-29 PROCEDURE — 99999 PR PBB SHADOW E&M-EST. PATIENT-LVL II: ICD-10-PCS | Mod: PBBFAC,,, | Performed by: MIDWIFE

## 2021-04-15 ENCOUNTER — PATIENT OUTREACH (OUTPATIENT)
Dept: ADMINISTRATIVE | Facility: OTHER | Age: 26
End: 2021-04-15

## 2021-04-19 ENCOUNTER — PATIENT MESSAGE (OUTPATIENT)
Dept: OBSTETRICS AND GYNECOLOGY | Facility: CLINIC | Age: 26
End: 2021-04-19

## 2021-04-19 ENCOUNTER — OFFICE VISIT (OUTPATIENT)
Dept: CARDIOLOGY | Facility: CLINIC | Age: 26
End: 2021-04-19
Payer: COMMERCIAL

## 2021-04-19 VITALS
OXYGEN SATURATION: 99 % | DIASTOLIC BLOOD PRESSURE: 72 MMHG | SYSTOLIC BLOOD PRESSURE: 104 MMHG | HEART RATE: 70 BPM | BODY MASS INDEX: 39.92 KG/M2 | WEIGHT: 218.25 LBS

## 2021-04-19 DIAGNOSIS — R00.2 PALPITATIONS: Primary | ICD-10-CM

## 2021-04-19 DIAGNOSIS — R55 SYNCOPE, UNSPECIFIED SYNCOPE TYPE: ICD-10-CM

## 2021-04-19 DIAGNOSIS — R55 VASOVAGAL SYNCOPE: ICD-10-CM

## 2021-04-19 PROCEDURE — 99214 OFFICE O/P EST MOD 30 MIN: CPT | Mod: S$GLB,,, | Performed by: INTERNAL MEDICINE

## 2021-04-19 PROCEDURE — 99999 PR PBB SHADOW E&M-EST. PATIENT-LVL II: ICD-10-PCS | Mod: PBBFAC,,, | Performed by: INTERNAL MEDICINE

## 2021-04-19 PROCEDURE — 99999 PR PBB SHADOW E&M-EST. PATIENT-LVL II: CPT | Mod: PBBFAC,,, | Performed by: INTERNAL MEDICINE

## 2021-04-19 PROCEDURE — 99214 PR OFFICE/OUTPT VISIT, EST, LEVL IV, 30-39 MIN: ICD-10-PCS | Mod: S$GLB,,, | Performed by: INTERNAL MEDICINE

## 2021-04-19 PROCEDURE — 3008F BODY MASS INDEX DOCD: CPT | Mod: CPTII,S$GLB,, | Performed by: INTERNAL MEDICINE

## 2021-04-19 PROCEDURE — 3008F PR BODY MASS INDEX (BMI) DOCUMENTED: ICD-10-PCS | Mod: CPTII,S$GLB,, | Performed by: INTERNAL MEDICINE

## 2021-04-26 ENCOUNTER — OFFICE VISIT (OUTPATIENT)
Dept: URGENT CARE | Facility: CLINIC | Age: 26
End: 2021-04-26
Payer: COMMERCIAL

## 2021-04-26 VITALS
HEART RATE: 62 BPM | DIASTOLIC BLOOD PRESSURE: 58 MMHG | WEIGHT: 216 LBS | SYSTOLIC BLOOD PRESSURE: 120 MMHG | OXYGEN SATURATION: 98 % | TEMPERATURE: 97 F | BODY MASS INDEX: 39.75 KG/M2 | HEIGHT: 62 IN | RESPIRATION RATE: 18 BRPM

## 2021-04-26 DIAGNOSIS — H00.015 HORDEOLUM EXTERNUM OF LEFT LOWER EYELID: Primary | ICD-10-CM

## 2021-04-26 PROCEDURE — 1126F AMNT PAIN NOTED NONE PRSNT: CPT | Mod: S$GLB,,, | Performed by: EMERGENCY MEDICINE

## 2021-04-26 PROCEDURE — 3008F PR BODY MASS INDEX (BMI) DOCUMENTED: ICD-10-PCS | Mod: CPTII,S$GLB,, | Performed by: EMERGENCY MEDICINE

## 2021-04-26 PROCEDURE — 99212 PR OFFICE/OUTPT VISIT, EST, LEVL II, 10-19 MIN: ICD-10-PCS | Mod: S$GLB,,, | Performed by: EMERGENCY MEDICINE

## 2021-04-26 PROCEDURE — 1126F PR PAIN SEVERITY QUANTIFIED, NO PAIN PRESENT: ICD-10-PCS | Mod: S$GLB,,, | Performed by: EMERGENCY MEDICINE

## 2021-04-26 PROCEDURE — 3008F BODY MASS INDEX DOCD: CPT | Mod: CPTII,S$GLB,, | Performed by: EMERGENCY MEDICINE

## 2021-04-26 PROCEDURE — 99212 OFFICE O/P EST SF 10 MIN: CPT | Mod: S$GLB,,, | Performed by: EMERGENCY MEDICINE

## 2021-04-26 RX ORDER — TOBRAMYCIN 3 MG/ML
1 SOLUTION/ DROPS OPHTHALMIC EVERY 4 HOURS
Qty: 5 ML | Refills: 0 | Status: SHIPPED | OUTPATIENT
Start: 2021-04-26 | End: 2021-04-29

## 2021-04-26 RX ORDER — LORATADINE 10 MG/1
10 TABLET ORAL DAILY
COMMUNITY

## 2021-04-29 ENCOUNTER — PATIENT MESSAGE (OUTPATIENT)
Dept: OBSTETRICS AND GYNECOLOGY | Facility: CLINIC | Age: 26
End: 2021-04-29

## 2021-04-29 ENCOUNTER — TELEPHONE (OUTPATIENT)
Dept: URGENT CARE | Facility: CLINIC | Age: 26
End: 2021-04-29

## 2021-04-29 ENCOUNTER — POSTPARTUM VISIT (OUTPATIENT)
Dept: OBSTETRICS AND GYNECOLOGY | Facility: CLINIC | Age: 26
End: 2021-04-29
Payer: COMMERCIAL

## 2021-04-29 VITALS
WEIGHT: 217.81 LBS | DIASTOLIC BLOOD PRESSURE: 66 MMHG | SYSTOLIC BLOOD PRESSURE: 118 MMHG | BODY MASS INDEX: 40.08 KG/M2 | HEIGHT: 62 IN

## 2021-04-29 DIAGNOSIS — Z30.011 ENCOUNTER FOR INITIAL PRESCRIPTION OF CONTRACEPTIVE PILLS: ICD-10-CM

## 2021-04-29 DIAGNOSIS — O99.340 DEPRESSION AFFECTING PREGNANCY: ICD-10-CM

## 2021-04-29 DIAGNOSIS — F32.A DEPRESSION AFFECTING PREGNANCY: ICD-10-CM

## 2021-04-29 PROBLEM — O99.013 ANEMIA DURING PREGNANCY IN THIRD TRIMESTER: Status: RESOLVED | Noted: 2021-01-23 | Resolved: 2021-04-29

## 2021-04-29 PROBLEM — O99.213 OBESITY DURING PREGNANCY IN THIRD TRIMESTER: Status: RESOLVED | Noted: 2020-07-31 | Resolved: 2021-04-29

## 2021-04-29 PROCEDURE — 0503F PR POSTPARTUM CARE VISIT: ICD-10-PCS | Mod: S$GLB,,, | Performed by: ADVANCED PRACTICE MIDWIFE

## 2021-04-29 PROCEDURE — 99999 PR PBB SHADOW E&M-EST. PATIENT-LVL III: ICD-10-PCS | Mod: PBBFAC,,, | Performed by: ADVANCED PRACTICE MIDWIFE

## 2021-04-29 PROCEDURE — 99999 PR PBB SHADOW E&M-EST. PATIENT-LVL III: CPT | Mod: PBBFAC,,, | Performed by: ADVANCED PRACTICE MIDWIFE

## 2021-04-29 PROCEDURE — 0503F POSTPARTUM CARE VISIT: CPT | Mod: S$GLB,,, | Performed by: ADVANCED PRACTICE MIDWIFE

## 2021-05-05 ENCOUNTER — PATIENT MESSAGE (OUTPATIENT)
Dept: OBSTETRICS AND GYNECOLOGY | Facility: CLINIC | Age: 26
End: 2021-05-05

## 2021-05-11 ENCOUNTER — PATIENT MESSAGE (OUTPATIENT)
Dept: OBSTETRICS AND GYNECOLOGY | Facility: CLINIC | Age: 26
End: 2021-05-11

## 2021-05-11 ENCOUNTER — OFFICE VISIT (OUTPATIENT)
Dept: INTERNAL MEDICINE | Facility: CLINIC | Age: 26
End: 2021-05-11
Payer: COMMERCIAL

## 2021-05-11 VITALS
WEIGHT: 218.69 LBS | OXYGEN SATURATION: 99 % | DIASTOLIC BLOOD PRESSURE: 60 MMHG | BODY MASS INDEX: 40.25 KG/M2 | SYSTOLIC BLOOD PRESSURE: 114 MMHG | HEIGHT: 62 IN | TEMPERATURE: 100 F | HEART RATE: 61 BPM

## 2021-05-11 DIAGNOSIS — J45.21 MILD INTERMITTENT ASTHMA WITH ACUTE EXACERBATION: ICD-10-CM

## 2021-05-11 PROCEDURE — 99999 PR PBB SHADOW E&M-EST. PATIENT-LVL IV: ICD-10-PCS | Mod: PBBFAC,,, | Performed by: PHYSICIAN ASSISTANT

## 2021-05-11 PROCEDURE — 99214 PR OFFICE/OUTPT VISIT, EST, LEVL IV, 30-39 MIN: ICD-10-PCS | Mod: S$GLB,,, | Performed by: PHYSICIAN ASSISTANT

## 2021-05-11 PROCEDURE — 1126F AMNT PAIN NOTED NONE PRSNT: CPT | Mod: S$GLB,,, | Performed by: PHYSICIAN ASSISTANT

## 2021-05-11 PROCEDURE — 1126F PR PAIN SEVERITY QUANTIFIED, NO PAIN PRESENT: ICD-10-PCS | Mod: S$GLB,,, | Performed by: PHYSICIAN ASSISTANT

## 2021-05-11 PROCEDURE — 3008F BODY MASS INDEX DOCD: CPT | Mod: CPTII,S$GLB,, | Performed by: PHYSICIAN ASSISTANT

## 2021-05-11 PROCEDURE — 99214 OFFICE O/P EST MOD 30 MIN: CPT | Mod: S$GLB,,, | Performed by: PHYSICIAN ASSISTANT

## 2021-05-11 PROCEDURE — 99999 PR PBB SHADOW E&M-EST. PATIENT-LVL IV: CPT | Mod: PBBFAC,,, | Performed by: PHYSICIAN ASSISTANT

## 2021-05-11 PROCEDURE — 3008F PR BODY MASS INDEX (BMI) DOCUMENTED: ICD-10-PCS | Mod: CPTII,S$GLB,, | Performed by: PHYSICIAN ASSISTANT

## 2021-05-11 RX ORDER — SERTRALINE HYDROCHLORIDE 50 MG/1
50 TABLET, FILM COATED ORAL DAILY
Qty: 90 TABLET | Refills: 3 | Status: SHIPPED | OUTPATIENT
Start: 2021-05-11 | End: 2021-06-01

## 2021-05-11 RX ORDER — ALBUTEROL SULFATE 90 UG/1
1-2 AEROSOL, METERED RESPIRATORY (INHALATION) EVERY 6 HOURS PRN
Qty: 6.7 G | Refills: 3 | Status: SHIPPED | OUTPATIENT
Start: 2021-05-11 | End: 2021-06-10

## 2021-05-12 ENCOUNTER — PATIENT MESSAGE (OUTPATIENT)
Dept: INTERNAL MEDICINE | Facility: CLINIC | Age: 26
End: 2021-05-12

## 2021-05-12 ENCOUNTER — OFFICE VISIT (OUTPATIENT)
Dept: URGENT CARE | Facility: CLINIC | Age: 26
End: 2021-05-12
Payer: COMMERCIAL

## 2021-05-12 VITALS
WEIGHT: 216 LBS | TEMPERATURE: 99 F | SYSTOLIC BLOOD PRESSURE: 122 MMHG | DIASTOLIC BLOOD PRESSURE: 58 MMHG | HEART RATE: 61 BPM | OXYGEN SATURATION: 100 % | BODY MASS INDEX: 39.75 KG/M2 | HEIGHT: 62 IN

## 2021-05-12 DIAGNOSIS — R11.0 NAUSEA: Primary | ICD-10-CM

## 2021-05-12 DIAGNOSIS — R09.81 NASAL CONGESTION: ICD-10-CM

## 2021-05-12 LAB
B-HCG UR QL: NEGATIVE
BILIRUB UR QL STRIP: NEGATIVE
CTP QC/QA: YES
CTP QC/QA: YES
GLUCOSE UR QL STRIP: NEGATIVE
KETONES UR QL STRIP: NEGATIVE
LEUKOCYTE ESTERASE UR QL STRIP: NEGATIVE
PH, POC UA: 5
POC BLOOD, URINE: NEGATIVE
POC NITRATES, URINE: NEGATIVE
PROT UR QL STRIP: NEGATIVE
SARS-COV-2 RDRP RESP QL NAA+PROBE: NEGATIVE
SP GR UR STRIP: 1 (ref 1–1.03)
UROBILINOGEN UR STRIP-ACNC: NORMAL (ref 0.1–1.1)

## 2021-05-12 PROCEDURE — 81003 POCT URINALYSIS, DIPSTICK, AUTOMATED, W/O SCOPE: ICD-10-PCS | Mod: QW,S$GLB,, | Performed by: EMERGENCY MEDICINE

## 2021-05-12 PROCEDURE — 81025 POCT URINE PREGNANCY: ICD-10-PCS | Mod: S$GLB,,, | Performed by: EMERGENCY MEDICINE

## 2021-05-12 PROCEDURE — U0002 COVID-19 LAB TEST NON-CDC: HCPCS | Mod: QW,S$GLB,, | Performed by: EMERGENCY MEDICINE

## 2021-05-12 PROCEDURE — 99213 OFFICE O/P EST LOW 20 MIN: CPT | Mod: 25,S$GLB,, | Performed by: EMERGENCY MEDICINE

## 2021-05-12 PROCEDURE — 1126F AMNT PAIN NOTED NONE PRSNT: CPT | Mod: S$GLB,,, | Performed by: EMERGENCY MEDICINE

## 2021-05-12 PROCEDURE — 81003 URINALYSIS AUTO W/O SCOPE: CPT | Mod: QW,S$GLB,, | Performed by: EMERGENCY MEDICINE

## 2021-05-12 PROCEDURE — U0002: ICD-10-PCS | Mod: QW,S$GLB,, | Performed by: EMERGENCY MEDICINE

## 2021-05-12 PROCEDURE — 3008F BODY MASS INDEX DOCD: CPT | Mod: CPTII,S$GLB,, | Performed by: EMERGENCY MEDICINE

## 2021-05-12 PROCEDURE — S0119 ONDANSETRON 4 MG: HCPCS | Mod: S$GLB,,, | Performed by: EMERGENCY MEDICINE

## 2021-05-12 PROCEDURE — 99213 PR OFFICE/OUTPT VISIT, EST, LEVL III, 20-29 MIN: ICD-10-PCS | Mod: 25,S$GLB,, | Performed by: EMERGENCY MEDICINE

## 2021-05-12 PROCEDURE — 81025 URINE PREGNANCY TEST: CPT | Mod: S$GLB,,, | Performed by: EMERGENCY MEDICINE

## 2021-05-12 PROCEDURE — 3008F PR BODY MASS INDEX (BMI) DOCUMENTED: ICD-10-PCS | Mod: CPTII,S$GLB,, | Performed by: EMERGENCY MEDICINE

## 2021-05-12 PROCEDURE — 1126F PR PAIN SEVERITY QUANTIFIED, NO PAIN PRESENT: ICD-10-PCS | Mod: S$GLB,,, | Performed by: EMERGENCY MEDICINE

## 2021-05-12 PROCEDURE — S0119 PR ONDANSETRON, ORAL, 4MG: ICD-10-PCS | Mod: S$GLB,,, | Performed by: EMERGENCY MEDICINE

## 2021-05-12 RX ORDER — ONDANSETRON 4 MG/1
4 TABLET, ORALLY DISINTEGRATING ORAL EVERY 6 HOURS PRN
Qty: 15 TABLET | Refills: 0 | Status: SHIPPED | OUTPATIENT
Start: 2021-05-12

## 2021-05-12 RX ORDER — FLUTICASONE PROPIONATE 50 MCG
2 SPRAY, SUSPENSION (ML) NASAL DAILY
Qty: 16 G | Refills: 0 | Status: SHIPPED | OUTPATIENT
Start: 2021-05-12

## 2021-05-12 RX ORDER — ONDANSETRON 4 MG/1
4 TABLET, ORALLY DISINTEGRATING ORAL
Status: COMPLETED | OUTPATIENT
Start: 2021-05-12 | End: 2021-05-12

## 2021-05-12 RX ADMIN — ONDANSETRON 4 MG: 4 TABLET, ORALLY DISINTEGRATING ORAL at 05:05

## 2021-05-14 ENCOUNTER — OFFICE VISIT (OUTPATIENT)
Dept: ALLERGY | Facility: CLINIC | Age: 26
End: 2021-05-14
Payer: COMMERCIAL

## 2021-05-14 DIAGNOSIS — R09.81 NASAL CONGESTION: Primary | ICD-10-CM

## 2021-05-14 PROCEDURE — 99214 PR OFFICE/OUTPT VISIT, EST, LEVL IV, 30-39 MIN: ICD-10-PCS | Mod: 95,,, | Performed by: ALLERGY & IMMUNOLOGY

## 2021-05-14 PROCEDURE — 99214 OFFICE O/P EST MOD 30 MIN: CPT | Mod: 95,,, | Performed by: ALLERGY & IMMUNOLOGY

## 2021-05-14 RX ORDER — PREDNISONE 20 MG/1
20 TABLET ORAL 2 TIMES DAILY
Qty: 10 TABLET | Refills: 0 | Status: SHIPPED | OUTPATIENT
Start: 2021-05-14

## 2021-05-14 RX ORDER — AZELASTINE 1 MG/ML
1 SPRAY, METERED NASAL 2 TIMES DAILY
Qty: 20 ML | Refills: 5 | Status: SHIPPED | OUTPATIENT
Start: 2021-05-14 | End: 2022-05-14

## 2021-05-15 ENCOUNTER — TELEPHONE (OUTPATIENT)
Dept: URGENT CARE | Facility: CLINIC | Age: 26
End: 2021-05-15

## 2021-05-17 ENCOUNTER — LAB VISIT (OUTPATIENT)
Dept: LAB | Facility: HOSPITAL | Age: 26
End: 2021-05-17
Attending: ALLERGY & IMMUNOLOGY
Payer: COMMERCIAL

## 2021-05-17 ENCOUNTER — PATIENT OUTREACH (OUTPATIENT)
Dept: ADMINISTRATIVE | Facility: OTHER | Age: 26
End: 2021-05-17

## 2021-05-17 ENCOUNTER — OFFICE VISIT (OUTPATIENT)
Dept: ALLERGY | Facility: CLINIC | Age: 26
End: 2021-05-17
Payer: COMMERCIAL

## 2021-05-17 VITALS
BODY MASS INDEX: 39.68 KG/M2 | SYSTOLIC BLOOD PRESSURE: 144 MMHG | TEMPERATURE: 98 F | HEIGHT: 62 IN | WEIGHT: 215.63 LBS | DIASTOLIC BLOOD PRESSURE: 88 MMHG

## 2021-05-17 DIAGNOSIS — R09.81 NASAL CONGESTION: Primary | ICD-10-CM

## 2021-05-17 DIAGNOSIS — R09.81 NASAL CONGESTION: ICD-10-CM

## 2021-05-17 LAB — IGE SERPL-ACNC: 89 IU/ML (ref 0–100)

## 2021-05-17 PROCEDURE — 86003 ALLG SPEC IGE CRUDE XTRC EA: CPT | Mod: 59 | Performed by: ALLERGY & IMMUNOLOGY

## 2021-05-17 PROCEDURE — 86003 ALLG SPEC IGE CRUDE XTRC EA: CPT | Performed by: ALLERGY & IMMUNOLOGY

## 2021-05-17 PROCEDURE — 96372 THER/PROPH/DIAG INJ SC/IM: CPT | Mod: S$GLB,,, | Performed by: ALLERGY & IMMUNOLOGY

## 2021-05-17 PROCEDURE — 99999 PR PBB SHADOW E&M-EST. PATIENT-LVL III: CPT | Mod: PBBFAC,,, | Performed by: ALLERGY & IMMUNOLOGY

## 2021-05-17 PROCEDURE — 99214 OFFICE O/P EST MOD 30 MIN: CPT | Mod: 25,S$GLB,, | Performed by: ALLERGY & IMMUNOLOGY

## 2021-05-17 PROCEDURE — 36415 COLL VENOUS BLD VENIPUNCTURE: CPT | Performed by: ALLERGY & IMMUNOLOGY

## 2021-05-17 PROCEDURE — 3008F PR BODY MASS INDEX (BMI) DOCUMENTED: ICD-10-PCS | Mod: CPTII,S$GLB,, | Performed by: ALLERGY & IMMUNOLOGY

## 2021-05-17 PROCEDURE — 3008F BODY MASS INDEX DOCD: CPT | Mod: CPTII,S$GLB,, | Performed by: ALLERGY & IMMUNOLOGY

## 2021-05-17 PROCEDURE — 82785 ASSAY OF IGE: CPT | Performed by: ALLERGY & IMMUNOLOGY

## 2021-05-17 PROCEDURE — 96372 PR INJECTION,THERAP/PROPH/DIAG2ST, IM OR SUBCUT: ICD-10-PCS | Mod: S$GLB,,, | Performed by: ALLERGY & IMMUNOLOGY

## 2021-05-17 PROCEDURE — 99214 PR OFFICE/OUTPT VISIT, EST, LEVL IV, 30-39 MIN: ICD-10-PCS | Mod: 25,S$GLB,, | Performed by: ALLERGY & IMMUNOLOGY

## 2021-05-17 PROCEDURE — 99999 PR PBB SHADOW E&M-EST. PATIENT-LVL III: ICD-10-PCS | Mod: PBBFAC,,, | Performed by: ALLERGY & IMMUNOLOGY

## 2021-05-17 RX ORDER — FLUTICASONE PROPIONATE 50 MCG
1 SPRAY, SUSPENSION (ML) NASAL DAILY
Qty: 16 G | Refills: 5 | Status: SHIPPED | OUTPATIENT
Start: 2021-05-17

## 2021-05-17 RX ORDER — METHYLPREDNISOLONE ACETATE 40 MG/ML
40 INJECTION, SUSPENSION INTRA-ARTICULAR; INTRALESIONAL; INTRAMUSCULAR; SOFT TISSUE
Status: COMPLETED | OUTPATIENT
Start: 2021-05-17 | End: 2021-05-17

## 2021-05-17 RX ADMIN — METHYLPREDNISOLONE ACETATE 40 MG: 40 INJECTION, SUSPENSION INTRA-ARTICULAR; INTRALESIONAL; INTRAMUSCULAR; SOFT TISSUE at 10:05

## 2021-05-19 LAB — AMER SYCAMORE IGE QN: <0.35 KU/L

## 2021-05-20 LAB
A ALTERNATA IGE QN: <0.1 KU/L
A FUMIGATUS IGE QN: <0.1 KU/L
ALLERGEN BOXELDER MAPLE TREE IGE: <0.1 KU/L
ALLERGEN CHAETOMIUM GLOBOSUM IGE: <0.1 KU/L
ALLERGEN MAPLE (BOX ELDER) CLASS: NORMAL
ALLERGEN MULBERRY CLASS: NORMAL
ALLERGEN MULBERRY TREE IGE: <0.1 KU/L
ALLERGEN WHITE ASH TREE IGE: <0.1 KU/L
ALLERGEN WHITE PINE TREE IGE: 0.82 KU/L
BAHIA GRASS IGE QN: 13.3 KU/L
BALD CYPRESS IGE QN: <0.1 KU/L
C HERBARUM IGE QN: <0.1 KU/L
C LUNATA IGE QN: <0.1 KU/L
CAT DANDER IGE QN: <0.1 KU/L
CHAETOMIUM GLOB. CLASS: NORMAL
COCKLEBUR IGE QN: <0.1 KU/L
COCKSFOOT IGE QN: 19.1 KU/L
COMMON RAGWEED IGE QN: <0.1 KU/L
COTTONWOOD IGE QN: 0.12 KU/L
D FARINAE IGE QN: 0.57 KU/L
D PTERONYSS IGE QN: 1.22 KU/L
DEPRECATED A ALTERNATA IGE RAST QL: NORMAL
DEPRECATED A FUMIGATUS IGE RAST QL: NORMAL
DEPRECATED BAHIA GRASS IGE RAST QL: ABNORMAL
DEPRECATED BALD CYPRESS IGE RAST QL: NORMAL
DEPRECATED C HERBARUM IGE RAST QL: NORMAL
DEPRECATED C LUNATA IGE RAST QL: NORMAL
DEPRECATED CAT DANDER IGE RAST QL: NORMAL
DEPRECATED COCKLEBUR IGE RAST QL: NORMAL
DEPRECATED COCKSFOOT IGE RAST QL: ABNORMAL
DEPRECATED COMMON RAGWEED IGE RAST QL: NORMAL
DEPRECATED COTTONWOOD IGE RAST QL: ABNORMAL
DEPRECATED D FARINAE IGE RAST QL: ABNORMAL
DEPRECATED D PTERONYSS IGE RAST QL: ABNORMAL
DEPRECATED DOG DANDER IGE RAST QL: NORMAL
DEPRECATED ELDER IGE RAST QL: ABNORMAL
DEPRECATED ENGL PLANTAIN IGE RAST QL: ABNORMAL
DEPRECATED GUM-TREE IGE RAST QL: NORMAL
DEPRECATED HACKBERRY TREE IGE RAST QL: NORMAL
DEPRECATED JOHNSON GRASS IGE RAST QL: ABNORMAL
DEPRECATED KENT BLUE GRASS IGE RAST QL: ABNORMAL
DEPRECATED LONDON PLANE IGE RAST QL: NORMAL
DEPRECATED MUGWORT IGE RAST QL: NORMAL
DEPRECATED NETTLE IGE RAST QL: ABNORMAL
DEPRECATED PECAN/HICK TREE IGE RAST QL: ABNORMAL
DEPRECATED PER RYE GRASS IGE RAST QL: ABNORMAL
DEPRECATED PRIVET IGE RAST QL: ABNORMAL
DEPRECATED RED TOP GRASS IGE RAST QL: ABNORMAL
DEPRECATED ROACH IGE RAST QL: ABNORMAL
DEPRECATED SALTWORT IGE RAST QL: NORMAL
DEPRECATED SHEEP SORREL IGE RAST QL: ABNORMAL
DEPRECATED SILVER BIRCH IGE RAST QL: ABNORMAL
DEPRECATED TIMOTHY IGE RAST QL: ABNORMAL
DEPRECATED WHITE OAK IGE RAST QL: ABNORMAL
DEPRECATED WILLOW IGE RAST QL: ABNORMAL
DOG DANDER IGE QN: <0.1 KU/L
ELDER IGE QN: 0.29 KU/L
ELM CEDAR CLASS: NORMAL
ELM CEDAR, IGE: <0.1 KU/L
ENGL PLANTAIN IGE QN: 0.73 KU/L
GUM-TREE IGE QN: <0.1 KU/L
HACKBERRY TREE IGE QN: <0.1 KU/L
JOHNSON GRASS IGE QN: 8 KU/L
KENT BLUE GRASS IGE QN: 21.7 KU/L
LONDON PLANE IGE QN: <0.1 KU/L
MUGWORT IGE QN: <0.1 KU/L
NETTLE IGE QN: 0.12 KU/L
PECAN/HICK TREE IGE QN: 6.22 KU/L
PER RYE GRASS IGE QN: 21.8 KU/L
PRIVET IGE QN: 1.07 KU/L
RED TOP GRASS IGE QN: 23.3 KU/L
ROACH IGE QN: 0.38 KU/L
SALTWORT IGE QN: <0.1 KU/L
SHEEP SORREL IGE QN: 1.35 KU/L
SILVER BIRCH IGE QN: 0.3 KU/L
STEMPHYLIUM HERBARUM CLASS: ABNORMAL
STEMPHYLLIUM, IGE: 0.13 KU/L
TIMOTHY IGE QN: 19.9 KU/L
WHITE ASH CLASS: NORMAL
WHITE OAK IGE QN: 0.18 KU/L
WHITE PINE CLASS: ABNORMAL
WILLOW IGE QN: 0.87 KU/L

## 2021-05-27 ENCOUNTER — OFFICE VISIT (OUTPATIENT)
Dept: INTERNAL MEDICINE | Facility: CLINIC | Age: 26
End: 2021-05-27
Payer: COMMERCIAL

## 2021-05-27 VITALS
HEART RATE: 77 BPM | SYSTOLIC BLOOD PRESSURE: 122 MMHG | DIASTOLIC BLOOD PRESSURE: 86 MMHG | TEMPERATURE: 97 F | HEIGHT: 62 IN | OXYGEN SATURATION: 99 % | WEIGHT: 215.38 LBS | BODY MASS INDEX: 39.63 KG/M2

## 2021-05-27 PROBLEM — F32.A DEPRESSION AFFECTING PREGNANCY: Status: RESOLVED | Noted: 2020-09-21 | Resolved: 2021-05-27

## 2021-05-27 PROBLEM — O99.340 DEPRESSION AFFECTING PREGNANCY: Status: RESOLVED | Noted: 2020-09-21 | Resolved: 2021-05-27

## 2021-05-27 PROCEDURE — 3008F PR BODY MASS INDEX (BMI) DOCUMENTED: ICD-10-PCS | Mod: CPTII,S$GLB,, | Performed by: PHYSICIAN ASSISTANT

## 2021-05-27 PROCEDURE — 1126F PR PAIN SEVERITY QUANTIFIED, NO PAIN PRESENT: ICD-10-PCS | Mod: S$GLB,,, | Performed by: PHYSICIAN ASSISTANT

## 2021-05-27 PROCEDURE — 3008F BODY MASS INDEX DOCD: CPT | Mod: CPTII,S$GLB,, | Performed by: PHYSICIAN ASSISTANT

## 2021-05-27 PROCEDURE — 99213 OFFICE O/P EST LOW 20 MIN: CPT | Mod: S$GLB,,, | Performed by: PHYSICIAN ASSISTANT

## 2021-05-27 PROCEDURE — 99999 PR PBB SHADOW E&M-EST. PATIENT-LVL IV: CPT | Mod: PBBFAC,,, | Performed by: PHYSICIAN ASSISTANT

## 2021-05-27 PROCEDURE — 1126F AMNT PAIN NOTED NONE PRSNT: CPT | Mod: S$GLB,,, | Performed by: PHYSICIAN ASSISTANT

## 2021-05-27 PROCEDURE — 99213 PR OFFICE/OUTPT VISIT, EST, LEVL III, 20-29 MIN: ICD-10-PCS | Mod: S$GLB,,, | Performed by: PHYSICIAN ASSISTANT

## 2021-05-27 PROCEDURE — 99999 PR PBB SHADOW E&M-EST. PATIENT-LVL IV: ICD-10-PCS | Mod: PBBFAC,,, | Performed by: PHYSICIAN ASSISTANT

## 2021-06-01 ENCOUNTER — OFFICE VISIT (OUTPATIENT)
Dept: INTERNAL MEDICINE | Facility: CLINIC | Age: 26
End: 2021-06-01
Payer: COMMERCIAL

## 2021-06-01 VITALS
SYSTOLIC BLOOD PRESSURE: 132 MMHG | HEIGHT: 62 IN | BODY MASS INDEX: 40.29 KG/M2 | OXYGEN SATURATION: 99 % | TEMPERATURE: 99 F | DIASTOLIC BLOOD PRESSURE: 86 MMHG | HEART RATE: 85 BPM | WEIGHT: 218.94 LBS

## 2021-06-01 DIAGNOSIS — F41.0 PANIC ATTACK: Primary | ICD-10-CM

## 2021-06-01 PROCEDURE — 99999 PR PBB SHADOW E&M-EST. PATIENT-LVL IV: CPT | Mod: PBBFAC,,, | Performed by: PHYSICIAN ASSISTANT

## 2021-06-01 PROCEDURE — 1126F AMNT PAIN NOTED NONE PRSNT: CPT | Mod: S$GLB,,, | Performed by: PHYSICIAN ASSISTANT

## 2021-06-01 PROCEDURE — 99214 PR OFFICE/OUTPT VISIT, EST, LEVL IV, 30-39 MIN: ICD-10-PCS | Mod: S$GLB,,, | Performed by: PHYSICIAN ASSISTANT

## 2021-06-01 PROCEDURE — 3008F PR BODY MASS INDEX (BMI) DOCUMENTED: ICD-10-PCS | Mod: CPTII,S$GLB,, | Performed by: PHYSICIAN ASSISTANT

## 2021-06-01 PROCEDURE — 3008F BODY MASS INDEX DOCD: CPT | Mod: CPTII,S$GLB,, | Performed by: PHYSICIAN ASSISTANT

## 2021-06-01 PROCEDURE — 99999 PR PBB SHADOW E&M-EST. PATIENT-LVL IV: ICD-10-PCS | Mod: PBBFAC,,, | Performed by: PHYSICIAN ASSISTANT

## 2021-06-01 PROCEDURE — 99214 OFFICE O/P EST MOD 30 MIN: CPT | Mod: S$GLB,,, | Performed by: PHYSICIAN ASSISTANT

## 2021-06-01 PROCEDURE — 1126F PR PAIN SEVERITY QUANTIFIED, NO PAIN PRESENT: ICD-10-PCS | Mod: S$GLB,,, | Performed by: PHYSICIAN ASSISTANT

## 2021-06-01 RX ORDER — SERTRALINE HYDROCHLORIDE 100 MG/1
100 TABLET, FILM COATED ORAL DAILY
Qty: 90 TABLET | Refills: 3
Start: 2021-06-01 | End: 2021-07-07 | Stop reason: SDUPTHER

## 2021-06-01 RX ORDER — HYDROXYZINE HYDROCHLORIDE 50 MG/1
50 TABLET, FILM COATED ORAL 3 TIMES DAILY PRN
Qty: 20 TABLET | Refills: 0 | Status: SHIPPED | OUTPATIENT
Start: 2021-06-01 | End: 2021-07-01

## 2021-07-01 ENCOUNTER — PATIENT OUTREACH (OUTPATIENT)
Dept: ADMINISTRATIVE | Facility: OTHER | Age: 26
End: 2021-07-01

## 2021-07-02 RX ORDER — SERTRALINE HYDROCHLORIDE 50 MG/1
50 TABLET, FILM COATED ORAL DAILY
Qty: 90 TABLET | Refills: 3 | OUTPATIENT
Start: 2021-07-02 | End: 2022-07-02

## 2021-07-07 ENCOUNTER — PATIENT MESSAGE (OUTPATIENT)
Dept: INTERNAL MEDICINE | Facility: CLINIC | Age: 26
End: 2021-07-07

## 2021-07-07 RX ORDER — SERTRALINE HYDROCHLORIDE 100 MG/1
100 TABLET, FILM COATED ORAL DAILY
Qty: 90 TABLET | Refills: 3 | Status: SHIPPED | OUTPATIENT
Start: 2021-07-07 | End: 2021-10-05

## 2021-07-09 ENCOUNTER — TELEPHONE (OUTPATIENT)
Dept: INTERNAL MEDICINE | Facility: CLINIC | Age: 26
End: 2021-07-09

## 2021-07-13 ENCOUNTER — OFFICE VISIT (OUTPATIENT)
Dept: INTERNAL MEDICINE | Facility: CLINIC | Age: 26
End: 2021-07-13
Payer: COMMERCIAL

## 2021-07-13 DIAGNOSIS — S99.919D ANKLE INJURY, UNSPECIFIED LATERALITY, SUBSEQUENT ENCOUNTER: Primary | ICD-10-CM

## 2021-07-13 DIAGNOSIS — Z02.9 ADMINISTRATIVE ENCOUNTER: ICD-10-CM

## 2021-07-13 PROCEDURE — 99213 PR OFFICE/OUTPT VISIT, EST, LEVL III, 20-29 MIN: ICD-10-PCS | Mod: 95,,, | Performed by: FAMILY MEDICINE

## 2021-07-13 PROCEDURE — 99213 OFFICE O/P EST LOW 20 MIN: CPT | Mod: 95,,, | Performed by: FAMILY MEDICINE

## 2021-07-14 ENCOUNTER — IMMUNIZATION (OUTPATIENT)
Dept: INTERNAL MEDICINE | Facility: CLINIC | Age: 26
End: 2021-07-14
Payer: COMMERCIAL

## 2021-07-14 DIAGNOSIS — Z23 NEED FOR VACCINATION: Primary | ICD-10-CM

## 2021-07-14 PROCEDURE — 91300 COVID-19, MRNA, LNP-S, PF, 30 MCG/0.3 ML DOSE VACCINE: CPT | Mod: PBBFAC

## 2021-08-04 ENCOUNTER — IMMUNIZATION (OUTPATIENT)
Dept: INTERNAL MEDICINE | Facility: CLINIC | Age: 26
End: 2021-08-04

## 2021-08-04 DIAGNOSIS — Z23 NEED FOR VACCINATION: Primary | ICD-10-CM

## 2021-08-04 PROCEDURE — 0002A COVID-19, MRNA, LNP-S, PF, 30 MCG/0.3 ML DOSE VACCINE: ICD-10-PCS | Mod: CV19,,, | Performed by: FAMILY MEDICINE

## 2021-08-04 PROCEDURE — 91300 COVID-19, MRNA, LNP-S, PF, 30 MCG/0.3 ML DOSE VACCINE: ICD-10-PCS | Mod: ,,, | Performed by: FAMILY MEDICINE

## 2021-08-04 PROCEDURE — 91300 COVID-19, MRNA, LNP-S, PF, 30 MCG/0.3 ML DOSE VACCINE: CPT | Mod: ,,, | Performed by: FAMILY MEDICINE

## 2021-08-04 PROCEDURE — 0002A COVID-19, MRNA, LNP-S, PF, 30 MCG/0.3 ML DOSE VACCINE: CPT | Mod: CV19,,, | Performed by: FAMILY MEDICINE

## 2022-04-10 ENCOUNTER — HISTORICAL (OUTPATIENT)
Dept: ADMINISTRATIVE | Facility: HOSPITAL | Age: 27
End: 2022-04-10
Payer: COMMERCIAL

## 2022-04-26 VITALS
DIASTOLIC BLOOD PRESSURE: 90 MMHG | HEIGHT: 62 IN | OXYGEN SATURATION: 100 % | WEIGHT: 235.88 LBS | BODY MASS INDEX: 43.41 KG/M2 | SYSTOLIC BLOOD PRESSURE: 136 MMHG

## 2022-04-30 NOTE — OP NOTE
DATE OF SURGERY:    09/19/2018    SURGEON:  Geovanni Morales IV, MD    PREOPERATIVE DIAGNOSIS:  Biliary dyskinesia, progressive and symptomatic with 54% ejection fraction, but severe reproduction of symptoms.    POSTOPERATIVE DIAGNOSIS:  Biliary dyskinesia, progressive and symptomatic with 54% ejection fraction, but severe reproduction of symptoms.    PROCEDURE:  Laparoscopic cholecystectomy via 3-trocar technique.    ANESTHESIA:  General with local infiltration.    ESTIMATED BLOOD LOSS:  Less than 5 cc.    SPECIMEN REMOVED:  Gallbladder with contents aspirated.    DETAILS OF PROCEDURE:  After proper informed consent was obtained, the patient was transported to the operating room where she was placed supine on the operating room table.  After an adequate level of general endotracheal anesthesia was achieved, the patient's abdomen was prepped and draped in a standard sterile surgical fashion.  The operation commenced with infiltration of local anesthetic in the supraumbilical position followed by supraumbilical incision and blunt dissection at the base of the umbilical ligament where a small hernia was identified and intubated with a 5 mm trocar.  Abdominal insufflation was then undertaken to 15 mmHg pressure without significant hemodynamic change.  A camera was inserted.  Abdominal contents were inspected and photographed.  No masses, lesions or pathology were demonstrated.  Following infiltration of local anesthetic and through a minor stab incision, 2 epigastric 5 mm trocars were placed.  Utilizing these trocars, the gallbladder was grasped and elevated.  The fibrofatty tissues were dissected from the triangle of Calot until the critical view was achieved.  Two clips were placed on the proximal cystic duct, 1 clip distally.  One clip was placed on the proximal cystic artery, 1 clip distally.  The duct and artery were transected in sequence and the gallbladder was excised from its bed with low power Bovie  electrocautery without difficulty.  The gallbladder was then partially withdrawn through the umbilical trocar site where it was aspirated of its bilious contents and then fully removed.  No stones were palpable.  The bowel was relatively thick and oxidized.  The camera was reinserted.  Abdominal contents were inspected and photographed.  No masses, lesions or pathology were distinctly evident.  The trocars were subsequently removed following desufflation the abdomen and the umbilical wound was closed with single 0 Vicryl figure-of-eight.  The additional wounds at the skin level were then all closed with a combination of 3-0 Vicryl and 4-0 Monocryl subcuticular closures.  Sterile dressings were applied.  The patient was awakened from her anesthesia and transferred to the recovery     room in good and stable condition.  All sponge, needle and instrument counts were correct at the end of the case, and there were no complications.        ______________________________  MD REINALDO Donato IV/ROSA ELENA  DD:  09/26/2018  Time:  07:52PM  DT:  09/27/2018  Time:  02:32AM  Job #:  [id number]    cc: Meenu Richard MD

## 2022-09-21 ENCOUNTER — HISTORICAL (OUTPATIENT)
Dept: ADMINISTRATIVE | Facility: HOSPITAL | Age: 27
End: 2022-09-21
Payer: COMMERCIAL

## 2022-10-05 NOTE — TELEPHONE ENCOUNTER
----- Message from Gustabo Lozoya sent at 5/15/2020  1:43 PM CDT -----  Contact: Lizte Bahena  Type:  Pharmacy Calling to Clarify an RX    Name of Caller:  Lizet  Pharmacy Name:  [facility]  Prescription Name:  Hizentra  What do they need to clarify?:  Came from [facility] considered 3rd party needs to be verified.  Best Call Back Number:  [phone] (direct)  Additional Information:  Will need new Rx with additional refill     Mohs Rapid Report Verbiage: The area of clinically evident tumor was marked with skin marking ink and appropriately hatched.  The initial incision was made following the Mohs approach through the skin.  The specimen was taken to the lab, divided into the necessary number of pieces, chromacoded and processed according to the Mohs protocol.  This was repeated in successive stages until a tumor free defect was achieved.

## 2024-06-10 ENCOUNTER — ANESTHESIA EVENT (OUTPATIENT)
Dept: ENDOSCOPY | Facility: HOSPITAL | Age: 29
End: 2024-06-10
Payer: COMMERCIAL

## 2024-06-10 NOTE — ANESTHESIA PREPROCEDURE EVALUATION
06/10/2024  Isaías Rush is a 28 y.o., female.  Past Medical History:   Diagnosis Date    Anxiety     Asthma     COVID-19 affecting pregnancy in second trimester 11/27/2020    Dysfunctional gallbladder     Gastritis     GERD (gastroesophageal reflux disease)     Gluten intolerance     Lactose intolerance      Past Surgical History:   Procedure Laterality Date    CHOLECYSTECTOMY      COLONOSCOPY  12/2018    ESOPHAGOGASTRODUODENOSCOPY N/A 2/5/2020    Procedure: EGD (ESOPHAGOGASTRODUODENOSCOPY);  Surgeon: Barbara Ruby MD;  Location: [facility];  Service: Endoscopy;  Laterality: N/A;    WRIST ARTHROPLASTY Bilateral 2009         Pre-op Assessment    I have reviewed the Patient Summary Reports.     I have reviewed the Nursing Notes. I have reviewed the NPO Status.   I have reviewed the Medications.     Review of Systems  Anesthesia Hx:  No problems with previous Anesthesia   History of prior surgery of interest to airway management or planning:            Denies Personal Hx of Anesthesia complications.                    Social:  Non-Smoker, Social Alcohol Use       Hematology/Oncology:       -- Anemia:                                  Pulmonary:    Asthma mild                   Hepatic/GI:     GERD             Endocrine:        Obesity / BMI > 30      Physical Exam  General: Well nourished, Cooperative, Alert and Oriented    Airway:  Mallampati: III   Mouth Opening: Normal  Tongue: Normal  Neck ROM: Normal ROM    Dental:  Intact        Anesthesia Plan  Type of Anesthesia, risks & benefits discussed:    Anesthesia Type: Gen Natural Airway  Intra-op Monitoring Plan: Standard ASA Monitors  Post Op Pain Control Plan: multimodal analgesia  Induction:  IV  Informed Consent: Informed consent signed with the Patient and all parties understand the risks and agree with anesthesia plan.  All questions answered.  Patient consented to blood products? No  ASA Score: 2  Day of Surgery Review of History & Physical: H&P Update referred to the surgeon/provider.    Ready For Surgery From Anesthesia Perspective.     .

## 2024-06-12 ENCOUNTER — HOSPITAL ENCOUNTER (OUTPATIENT)
Facility: HOSPITAL | Age: 29
Discharge: HOME | End: 2024-06-12
Attending: INTERNAL MEDICINE | Admitting: INTERNAL MEDICINE
Payer: COMMERCIAL

## 2024-06-12 ENCOUNTER — ANESTHESIA (OUTPATIENT)
Dept: ENDOSCOPY | Facility: HOSPITAL | Age: 29
End: 2024-06-12
Payer: COMMERCIAL

## 2024-06-12 DIAGNOSIS — K21.9 GERD (GASTROESOPHAGEAL REFLUX DISEASE): ICD-10-CM

## 2024-06-12 DIAGNOSIS — K21.9 GASTROESOPHAGEAL REFLUX DISEASE, UNSPECIFIED WHETHER ESOPHAGITIS PRESENT: Primary | ICD-10-CM

## 2024-06-12 LAB
B-HCG UR QL: NEGATIVE
CTP QC/QA: YES

## 2024-06-12 PROCEDURE — 43235 EGD DIAGNOSTIC BRUSH WASH: CPT | Mod: ,,, | Performed by: INTERNAL MEDICINE

## 2024-06-12 PROCEDURE — 43235 EGD DIAGNOSTIC BRUSH WASH: CPT | Performed by: INTERNAL MEDICINE

## 2024-06-12 PROCEDURE — D9220A PRA ANESTHESIA: Mod: CRNA,,, | Performed by: NURSE ANESTHETIST, CERTIFIED REGISTERED

## 2024-06-12 PROCEDURE — 37000008 HC ANESTHESIA 1ST 15 MINUTES: Performed by: INTERNAL MEDICINE

## 2024-06-12 PROCEDURE — 91035 G-ESOPH REFLX TST W/ELECTROD: CPT | Mod: TC | Performed by: INTERNAL MEDICINE

## 2024-06-12 PROCEDURE — D9220A PRA ANESTHESIA: Mod: ANES,,, | Performed by: ANESTHESIOLOGY

## 2024-06-12 PROCEDURE — 81025 URINE PREGNANCY TEST: CPT | Performed by: INTERNAL MEDICINE

## 2024-06-12 PROCEDURE — 91037 ESOPH IMPED FUNCTION TEST: CPT | Mod: TC | Performed by: INTERNAL MEDICINE

## 2024-06-12 PROCEDURE — 63600175 PHARM REV CODE 636 W HCPCS: Performed by: INTERNAL MEDICINE

## 2024-06-12 PROCEDURE — 25000003 PHARM REV CODE 250: Performed by: NURSE ANESTHETIST, CERTIFIED REGISTERED

## 2024-06-12 PROCEDURE — 91010 ESOPHAGUS MOTILITY STUDY: CPT | Mod: TC | Performed by: INTERNAL MEDICINE

## 2024-06-12 PROCEDURE — 63600175 PHARM REV CODE 636 W HCPCS: Performed by: NURSE ANESTHETIST, CERTIFIED REGISTERED

## 2024-06-12 PROCEDURE — 25000003 PHARM REV CODE 250: Performed by: INTERNAL MEDICINE

## 2024-06-12 PROCEDURE — 27200942: Performed by: INTERNAL MEDICINE

## 2024-06-12 PROCEDURE — 37000009 HC ANESTHESIA EA ADD 15 MINS: Performed by: INTERNAL MEDICINE

## 2024-06-12 RX ORDER — OMEPRAZOLE 40 MG/1
40 CAPSULE, DELAYED RELEASE ORAL DAILY
COMMUNITY

## 2024-06-12 RX ORDER — SODIUM CHLORIDE, SODIUM LACTATE, POTASSIUM CHLORIDE, CALCIUM CHLORIDE 600; 310; 30; 20 MG/100ML; MG/100ML; MG/100ML; MG/100ML
INJECTION, SOLUTION INTRAVENOUS CONTINUOUS
Status: DISCONTINUED | OUTPATIENT
Start: 2024-06-12 | End: 2024-06-12 | Stop reason: HOSPADM

## 2024-06-12 RX ORDER — LIDOCAINE HYDROCHLORIDE 20 MG/ML
INJECTION, SOLUTION EPIDURAL; INFILTRATION; INTRACAUDAL; PERINEURAL
Status: DISCONTINUED | OUTPATIENT
Start: 2024-06-12 | End: 2024-06-12

## 2024-06-12 RX ORDER — FENTANYL CITRATE 50 UG/ML
INJECTION, SOLUTION INTRAMUSCULAR; INTRAVENOUS
Status: DISCONTINUED | OUTPATIENT
Start: 2024-06-12 | End: 2024-06-12

## 2024-06-12 RX ORDER — PROPOFOL 10 MG/ML
VIAL (ML) INTRAVENOUS
Status: DISCONTINUED | OUTPATIENT
Start: 2024-06-12 | End: 2024-06-12

## 2024-06-12 RX ORDER — LIDOCAINE HYDROCHLORIDE 20 MG/ML
2 SOLUTION OROPHARYNGEAL ONCE
Status: COMPLETED | OUTPATIENT
Start: 2024-06-12 | End: 2024-06-12

## 2024-06-12 RX ADMIN — LIDOCAINE HYDROCHLORIDE 2 ML: 20 SOLUTION ORAL at 08:06

## 2024-06-12 RX ADMIN — LIDOCAINE HYDROCHLORIDE 100 MG: 20 INJECTION, SOLUTION EPIDURAL; INFILTRATION; INTRACAUDAL; PERINEURAL at 09:06

## 2024-06-12 RX ADMIN — PROPOFOL 100 MG: 10 INJECTION, EMULSION INTRAVENOUS at 09:06

## 2024-06-12 RX ADMIN — PROPOFOL 50 MG: 10 INJECTION, EMULSION INTRAVENOUS at 09:06

## 2024-06-12 RX ADMIN — FENTANYL CITRATE 50 MCG: 50 INJECTION, SOLUTION INTRAMUSCULAR; INTRAVENOUS at 09:06

## 2024-06-12 RX ADMIN — SODIUM CHLORIDE, SODIUM LACTATE, POTASSIUM CHLORIDE, AND CALCIUM CHLORIDE: 600; 310; 30; 20 INJECTION, SOLUTION INTRAVENOUS at 09:06

## 2024-06-12 RX ADMIN — PROPOFOL 30 MG: 10 INJECTION, EMULSION INTRAVENOUS at 09:06

## 2024-06-12 NOTE — H&P
PRE PROCEDURE H&P    Patient Name: Isaías Rush  MRN: [id number]  : 1995  Date of Procedure:  2024  Referring Physician: Dilma Cash MD  Primary Physician: Junito Del Valle MD  Procedure Physician: Dilma Cash MD       Planned Procedure: EGD with Bravo  Diagnosis: GERD  Chief Complaint: Same as above    HPI: Patient is an 28 y.o. female is here for the above.     Anticoagulation: None    Past Medical History:   Past Medical History:   Diagnosis Date    Anxiety     Asthma     COVID-19 affecting pregnancy in second trimester 2020    Dysfunctional gallbladder     Gastritis     GERD (gastroesophageal reflux disease)     Gluten intolerance     Lactose intolerance         Past Surgical History:  Past Surgical History:   Procedure Laterality Date    CHOLECYSTECTOMY      COLONOSCOPY  2018    ESOPHAGOGASTRODUODENOSCOPY N/A 2020    Procedure: EGD (ESOPHAGOGASTRODUODENOSCOPY);  Surgeon: Barbara Ruby MD;  Location: [facility];  Service: Endoscopy;  Laterality: N/A;    WRIST ARTHROPLASTY Bilateral         Home Medications:  Prior to Admission medications    Medication Sig Start Date End Date Taking? Authorizing Provider   omeprazole (PRILOSEC) 40 MG capsule Take 40 mg by mouth once daily.   Yes Provider, Historical   ondansetron (ZOFRAN-ODT) 4 MG TbDL Take 1 tablet (4 mg total) by mouth every 6 (six) hours as needed (nausea). 21  Yes Anita Kearns MD   albuterol (ACCUNEB) 1.25 mg/3 mL Nebu Take 3 mLs (1.25 mg total) by nebulization every 6 (six) hours as needed. Rescue 20  Aleena Gao PA-C   azelastine (ASTELIN) 137 mcg (0.1 %) nasal spray 1 spray (137 mcg total) by Nasal route 2 (two) times daily. 21  Nirmala Velasquez MD   EPINEPHrine (SYMJEPI) 0.3 mg/0.3 mL Syrg Inject 0.3 mLs (0.3 mg total) as directed as needed. 20  Nirmala Velasquez MD   fluticasone propionate (FLONASE) 50 mcg/actuation nasal spray 2 sprays (100 mcg total) by  Each Nostril route once daily. 5/12/21   Anita Kearns MD   fluticasone propionate (FLONASE) 50 mcg/actuation nasal spray Spray 1 spray (50 mcg total) in each nostril once daily. 5/17/21   Nirmala Velasquez MD   immun glob G,IgG,-pro-IgA 0-50 (HIZENTRA) 1 gram/5 mL (20 %) Soln Infuse one - 1 g vial into the skin once every 2 weeks. To be infused along with two - 10 g vials and one - 4 g vial  for a total dose of 25 grams every 2 weeks. 9/5/19      immun glob G,IgG,-pro-IgA 0-50 (HIZENTRA) 10 gram/50 mL (20 %) Soln Infuse two - 10 g vials into the skin once every 2 weeks. To be infused along with one - 4 g vial and one - 1 g vial  for a total dose of 25 grams every 2 weeks. 3/30/20   Nirmala Velasquez MD   immun glob G,IgG,-pro-IgA 0-50 (HIZENTRA) 4 gram/20 mL (20 %) Soln Infuse one - 4 g vial into the skin once every 2 weeks. To be infused along with two - 10 g vials and one - 1 g vial  for a total dose of 25 grams every 2 weeks. 9/5/19      loratadine (CLARITIN) 10 mg tablet Take 10 mg by mouth once daily.    Provider, Historical   norgestrel-ethinyl estradioL (LO/OVRAL) 0.3-30 mg-mcg per tablet Take 1 tablet by mouth once daily. 4/29/21 4/29/22  Bob Albert CNM   predniSONE (DELTASONE) 20 MG tablet Take 1 tablet (20 mg total) by mouth 2 (two) times daily. 5/14/21   Nirmala Velasquez MD   sertraline (ZOLOFT) 100 MG tablet Take 1 tablet (100 mg total) by mouth once daily. 7/7/21 7/7/22  Junito Del Valle MD   vitamin D (VITAMIN D3) 1000 units Tab Take 1,000 Units by mouth once daily.    Provider, Historical        Allergies:  Review of patient's allergies indicates:   Allergen Reactions    Fire ant Anaphylaxis     anaphylaxis    Cefprozil Hives    Poison ivy extract Rash        Social History:   Social History     Socioeconomic History    Marital status: Single   Tobacco Use    Smoking status: Never    Smokeless tobacco: Never   Substance and Sexual Activity    Alcohol use: Not Currently     Comment: rare    Drug use:  Never    Sexual activity: Yes     Partners: Male     Birth control/protection: None   Social History Narrative    ** Merged History Encounter **          Social Determinants of Health     Financial Resource Strain: Low Risk  (7/13/2021)    Overall Financial Resource Strain (CARDIA)     Difficulty of Paying Living Expenses: Not hard at all   Food Insecurity: No Food Insecurity (7/13/2021)    Hunger Vital Sign     Worried About Running Out of Food in the Last Year: Never true     Ran Out of Food in the Last Year: Never true   Transportation Needs: No Transportation Needs (7/13/2021)    PRAPARE - Transportation     Lack of Transportation (Medical): No     Lack of Transportation (Non-Medical): No   Physical Activity: Unknown (7/13/2021)    Exercise Vital Sign     Days of Exercise per Week: Patient declined     Minutes of Exercise per Session: 0 min   Stress: Unknown (7/13/2021)    [employer] - Occupational Stress Questionnaire     Feeling of Stress : Patient declined       Family History:  Family History   Problem Relation Name Age of Onset    JESSICA disease Mother      JESSICA disease Maternal Grandmother      Thyroid disease Maternal Grandmother      Arthritis Maternal Grandmother      Heart disease Paternal Grandfather      Breast cancer Other great-grandmother         mat great GM    Colon cancer Neg Hx      Ovarian cancer Neg Hx      Thrombosis Neg Hx         ROS: No acute cardiac events, no acute respiratory complaints.     Physical Exam (all patients):    /73 (BP Location: Right arm, Patient Position: Sitting)   Pulse 66   Temp 97.7 °F (36.5 °C) (Temporal)   Resp 18   Wt 105.2 kg (232 lb)   SpO2 99%   Breastfeeding No   BMI 42.43 kg/m²   Lungs: Clear to auscultation bilaterally, respirations unlabored  Heart: Regular rate and rhythm, S1 and S2 normal, no obvious murmurs  Abdomen:         Soft, non-tender, bowel sounds normal, no masses, no organomegaly    Lab Results    Component Value Date    WBC 9.55 03/22/2021    MCV 93 03/22/2021    RDW 12.3 03/22/2021     03/22/2021    INR 1.0 03/07/2019    GLU 85 03/22/2021    HGBA1C 5.3 07/31/2020    BUN 4 (L) 03/22/2021     03/22/2021    K 3.9 03/22/2021     03/22/2021        SEDATION PLAN: per anesthesia      History reviewed, vital signs satisfactory, cardiopulmonary status satisfactory, sedation options, risks and plans have been discussed with the patient  All their questions were answered and the patient agrees to the sedation procedures as planned and the patient is deemed an appropriate candidate for the sedation as planned.    Procedure explained to patient, informed consent obtained and placed in chart.    Dilma Cash  6/12/2024  9:29 AM

## 2024-06-12 NOTE — ANESTHESIA POSTPROCEDURE EVALUATION
Anesthesia Post Evaluation    Patient: Isaías Rush    Procedure(s) Performed: Procedure(s) (LRB):  EGD (ESOPHAGOGASTRODUODENOSCOPY) (N/A)  PH MONITORING, ESOPHAGUS, WIRELESS, (OFF REFLUX MEDS) (N/A)  MANOMETRY, ESOPHAGUS, WITH IMPEDANCE MEASUREMENT (N/A)    Final Anesthesia Type: general      Patient location during evaluation: PACU  Patient participation: Yes- Able to Participate  Level of consciousness: awake  Post-procedure vital signs: reviewed and stable  Pain management: adequate  Airway patency: patent    PONV status at discharge: No PONV  Anesthetic complications: no      Cardiovascular status: stable  Respiratory status: unassisted  Hydration status: euvolemic  Follow-up not needed.              Vitals Value Taken Time   /65 06/12/24 0951        Pulse 79 06/12/24 0951   Resp 14 06/12/24 0951   SpO2 98 % 06/12/24 0951         No case tracking events are documented in the log.      Pain/Sylvia Score: Sylvia Score: 9 (6/12/2024  9:46 AM)

## 2024-06-12 NOTE — PLAN OF CARE
Discharge instructions reviewed with pt and spouse, handouts given, verbalized understanding with no further questions at this time. Dr. Cash and STEPHON Ingram, RN spoke to pt at bedside, reviewed procedure, BRAVO equipment and answered questions with MD/ clinic telephone number provided per AVS sheet. VSS on RA, no pain or nausea noted. Fall precautions reviewed, consents in chart.

## 2024-06-12 NOTE — TRANSFER OF CARE
Anesthesia Transfer of Care Note    Patient: Isaías Rush    Procedure(s) Performed: Procedure(s) (LRB):  EGD (ESOPHAGOGASTRODUODENOSCOPY) (N/A)  PH MONITORING, ESOPHAGUS, WIRELESS, (OFF REFLUX MEDS) (N/A)  MANOMETRY, ESOPHAGUS, WITH IMPEDANCE MEASUREMENT (N/A)    Patient location: PACU    Anesthesia Type: general    Transport from OR: Transported from OR on room air with adequate spontaneous ventilation    Post pain: adequate analgesia    Post assessment: no apparent anesthetic complications and tolerated procedure well    Post vital signs: stable    Level of consciousness: awake    Nausea/Vomiting: no nausea/vomiting    Complications: none    Transfer of care protocol was followed      Last vitals: Visit Vitals  /73 (BP Location: Right arm, Patient Position: Sitting)   Pulse 66   Temp 36.5 °C (97.7 °F) (Temporal)   Resp 18   Wt 105.2 kg (232 lb)   SpO2 99%   Breastfeeding No   BMI 42.43 kg/m²

## 2024-06-12 NOTE — PROVATION PATIENT INSTRUCTIONS
Discharge Summary/Instructions after an Endoscopic Procedure  Patient Name: Isaías Rush  Patient MRN: [id number]  Patient YOB: 1995 Wednesday, June 12, 2024  Dilma Cash MD  Dear patient,  As a result of recent federal legislation (The Federal Cures Act), you may   receive lab or pathology results from your procedure in your [facility]   account before your physician is able to contact you. Your physician or   their representative will relay the results to you with their   recommendations at their soonest availability.  Thank you,  RESTRICTIONS:  During your procedure today, you received medications for sedation.  These   medications may affect your judgment, balance and coordination.  Therefore,   for 24 hours, you have the following restrictions:   - DO NOT drive a car, operate machinery, make legal/financial decisions,   sign important papers or drink alcohol.    ACTIVITY:  Today: no heavy lifting, straining or running due to procedural   sedation/anesthesia.  The following day: return to full activity including work.  DIET:  Eat and drink normally unless instructed otherwise.     TREATMENT FOR COMMON SIDE EFFECTS:  - Mild abdominal pain, nausea, belching, bloating or excessive gas:  rest,   eat lightly and use a heating pad.  - Sore Throat: treat with throat lozenges and/or gargle with warm salt   water.  - Because air was used during the procedure, expelling large amounts of air   from your rectum or belching is normal.  - If a bowel prep was taken, you may not have a bowel movement for 1-3 days.    This is normal.  SYMPTOMS TO WATCH FOR AND REPORT TO YOUR PHYSICIAN:  1. Abdominal pain or bloating, other than gas cramps.  2. Chest pain.  3. Back pain.  4. Signs of infection such as: chills or fever occurring within 24 hours   after the procedure.  5. Rectal bleeding, which would show as bright red, maroon, or black stools.   (A tablespoon of blood from the rectum is not serious, especially  if   hemorrhoids are present.)  6. Vomiting.  7. Weakness or dizziness.  GO DIRECTLY TO THE NEAREST EMERGENCY ROOM IF YOU HAVE ANY OF THE FOLLOWING:      Difficulty breathing              Chills and/or fever over 101 F   Persistent vomiting and/or vomiting blood   Severe abdominal pain   Severe chest pain   Black, tarry stools   Bleeding- more than one tablespoon   Any other symptom or condition that you feel may need urgent attention  Your doctor recommends these additional instructions:  If any biopsies were taken, your doctors clinic will contact you in 1 to 2   weeks with any results.  - Discharge patient to home.   - Resume previous diet.   - Continue present medications.   - Return to referring physician.  For questions, problems or results please call your physician Dilma Cash MD at Work:  [phone]  If you have any questions about the above instructions, call the GI   department at [phone] or call the endoscopy unit at [phone]   from 7am until 3 pm.  [facility] [place], EMERGENCY ROOM PHONE NUMBER:   [phone]  IF A COMPLICATION OR EMERGENCY SITUATION ARISES AND YOU ARE UNABLE TO REACH   YOUR PHYSICIAN - GO DIRECTLY TO THE EMERGENCY ROOM.  I have read or have had read to me these discharge instructions for my   procedure and have received a written copy.  I understand these   instructions and will follow-up with my physician if I have any questions.     __________________________________       _____________________________________  Nurse Signature                                          Patient/Designated   Responsible Party Signature  Dilma Cash MD  6/12/2024 9:50:26 AM  This report has been verified and signed electronically.  Dear patient,  As a result of recent federal legislation (The Federal Cures Act), you may   receive lab or pathology results from your procedure in your [facility]   account before your physician is able to contact you.  Your physician or   their representative will relay the results to you with their   recommendations at their soonest availability.  Thank you,  PROVATION

## 2024-06-13 VITALS
SYSTOLIC BLOOD PRESSURE: 121 MMHG | BODY MASS INDEX: 42.43 KG/M2 | HEART RATE: 62 BPM | RESPIRATION RATE: 17 BRPM | OXYGEN SATURATION: 100 % | TEMPERATURE: 98 F | WEIGHT: 232 LBS | DIASTOLIC BLOOD PRESSURE: 72 MMHG

## 2024-06-13 PROCEDURE — 91037 ESOPH IMPED FUNCTION TEST: CPT | Mod: 26,,, | Performed by: INTERNAL MEDICINE

## 2024-06-13 PROCEDURE — 91010 ESOPHAGUS MOTILITY STUDY: CPT | Mod: 26,,, | Performed by: INTERNAL MEDICINE

## 2024-06-13 NOTE — PROVATION PATIENT INSTRUCTIONS
Discharge Summary/Instructions after an Endoscopic Procedure  Patient Name: Isaías Rush  Patient MRN: [id number]  Patient YOB: 1995 Tuesday, June 11, 2024 Barbara Ruby MD  Dear patient,  As a result of recent federal legislation (The Federal Cures Act), you may   receive lab or pathology results from your procedure in your [facility]   account before your physician is able to contact you. Your physician or   their representative will relay the results to you with their   recommendations at their soonest availability.  Thank you,  RESTRICTIONS:  During your procedure today, you received medications for sedation.  These   medications may affect your judgment, balance and coordination.  Therefore,   for 24 hours, you have the following restrictions:   - DO NOT drive a car, operate machinery, make legal/financial decisions,   sign important papers or drink alcohol.    ACTIVITY:  Today: no heavy lifting, straining or running due to procedural   sedation/anesthesia.  The following day: return to full activity including work.  DIET:  Eat and drink normally unless instructed otherwise.     TREATMENT FOR COMMON SIDE EFFECTS:  - Mild abdominal pain, nausea, belching, bloating or excessive gas:  rest,   eat lightly and use a heating pad.  - Sore Throat: treat with throat lozenges and/or gargle with warm salt   water.  - Because air was used during the procedure, expelling large amounts of air   from your rectum or belching is normal.  - If a bowel prep was taken, you may not have a bowel movement for 1-3 days.    This is normal.  SYMPTOMS TO WATCH FOR AND REPORT TO YOUR PHYSICIAN:  1. Abdominal pain or bloating, other than gas cramps.  2. Chest pain.  3. Back pain.  4. Signs of infection such as: chills or fever occurring within 24 hours   after the procedure.  5. Rectal bleeding, which would show as bright red, maroon, or black stools.   (A tablespoon of blood from the rectum is not serious, especially if    hemorrhoids are present.)  6. Vomiting.  7. Weakness or dizziness.  GO DIRECTLY TO THE NEAREST EMERGENCY ROOM IF YOU HAVE ANY OF THE FOLLOWING:      Difficulty breathing              Chills and/or fever over 101 F   Persistent vomiting and/or vomiting blood   Severe abdominal pain   Severe chest pain   Black, tarry stools   Bleeding- more than one tablespoon   Any other symptom or condition that you feel may need urgent attention  Your doctor recommends these additional instructions:  If any biopsies were taken, your doctors clinic will contact you in 1 to 2   weeks with any results.  - Continue present medications.  For questions, problems or results please call your physician Barbara Ruby MD at Work:  [phone]  If you have any questions about the above instructions, call the GI   department at [phone] or call the endoscopy unit at [phone]   from 7am until 3 pm.  [facility], EMERGENCY ROOM PHONE NUMBER:   [phone]  IF A COMPLICATION OR EMERGENCY SITUATION ARISES AND YOU ARE UNABLE TO REACH   YOUR PHYSICIAN - GO DIRECTLY TO THE EMERGENCY ROOM.  I have read or have had read to me these discharge instructions for my   procedure and have received a written copy.  I understand these   instructions and will follow-up with my physician if I have any questions.     __________________________________       _____________________________________  Nurse Signature                                          Patient/Designated   Responsible Party Signature  MD Barbara Smith MD  6/13/2024 9:16:22 AM  [employer]ATION

## 2024-06-27 NOTE — PROVATION PATIENT INSTRUCTIONS
Discharge Summary/Instructions after an Endoscopic Procedure  Patient Name: Isaías Rush  Patient MRN: [id number]  Patient YOB: 1995 Wednesday, June 12, 2024  Dilma Cash MD  Dear patient,  As a result of recent federal legislation (The Federal Cures Act), you may   receive lab or pathology results from your procedure in your [facility]   account before your physician is able to contact you. Your physician or   their representative will relay the results to you with their   recommendations at their soonest availability.  Thank you,  RESTRICTIONS:  During your procedure today, you received medications for sedation.  These   medications may affect your judgment, balance and coordination.  Therefore,   for 24 hours, you have the following restrictions:   - DO NOT drive a car, operate machinery, make legal/financial decisions,   sign important papers or drink alcohol.    ACTIVITY:  Today: no heavy lifting, straining or running due to procedural   sedation/anesthesia.  The following day: return to full activity including work.  DIET:  Eat and drink normally unless instructed otherwise.     TREATMENT FOR COMMON SIDE EFFECTS:  - Mild abdominal pain, nausea, belching, bloating or excessive gas:  rest,   eat lightly and use a heating pad.  - Sore Throat: treat with throat lozenges and/or gargle with warm salt   water.  - Because air was used during the procedure, expelling large amounts of air   from your rectum or belching is normal.  - If a bowel prep was taken, you may not have a bowel movement for 1-3 days.    This is normal.  SYMPTOMS TO WATCH FOR AND REPORT TO YOUR PHYSICIAN:  1. Abdominal pain or bloating, other than gas cramps.  2. Chest pain.  3. Back pain.  4. Signs of infection such as: chills or fever occurring within 24 hours   after the procedure.  5. Rectal bleeding, which would show as bright red, maroon, or black stools.   (A tablespoon of blood from the rectum is not serious, especially  if   hemorrhoids are present.)  6. Vomiting.  7. Weakness or dizziness.  GO DIRECTLY TO THE NEAREST EMERGENCY ROOM IF YOU HAVE ANY OF THE FOLLOWING:      Difficulty breathing              Chills and/or fever over 101 F   Persistent vomiting and/or vomiting blood   Severe abdominal pain   Severe chest pain   Black, tarry stools   Bleeding- more than one tablespoon   Any other symptom or condition that you feel may need urgent attention  Your doctor recommends these additional instructions:  If any biopsies were taken, your doctors clinic will contact you in 1 to 2   weeks with any results.  - Discharge patient to home.   - Return to referring physician.   - Please optimize anti-reflux regimen.  For questions, problems or results please call your physician Dilma Cash MD at Work:  [phone]  If you have any questions about the above instructions, call the GI   department at [phone] or call the endoscopy unit at [phone]   from 7am until 3 pm.  [facility] [place], EMERGENCY ROOM PHONE NUMBER:   [phone]  IF A COMPLICATION OR EMERGENCY SITUATION ARISES AND YOU ARE UNABLE TO REACH   YOUR PHYSICIAN - GO DIRECTLY TO THE EMERGENCY ROOM.  I have read or have had read to me these discharge instructions for my   procedure and have received a written copy.  I understand these   instructions and will follow-up with my physician if I have any questions.     __________________________________       _____________________________________  Nurse Signature                                          Patient/Designated   Responsible Party Signature  Dilma Cash MD  6/27/2024 3:52:57 PM  This report has been verified and signed electronically.  Dear patient,  As a result of recent federal legislation (The Federal Cures Act), you may   receive lab or pathology results from your procedure in your [facility]   account before your physician is able to contact you. Your physician or    their representative will relay the results to you with their   recommendations at their soonest availability.  Thank you,  PROVATION

## 2025-06-14 ENCOUNTER — HOSPITAL ENCOUNTER (EMERGENCY)
Facility: HOSPITAL | Age: 30
Discharge: HOME | End: 2025-06-14
Attending: EMERGENCY MEDICINE
Payer: COMMERCIAL

## 2025-06-14 VITALS
TEMPERATURE: 98 F | DIASTOLIC BLOOD PRESSURE: 56 MMHG | HEART RATE: 65 BPM | WEIGHT: 189 LBS | BODY MASS INDEX: 32.27 KG/M2 | SYSTOLIC BLOOD PRESSURE: 128 MMHG | HEIGHT: 64 IN | RESPIRATION RATE: 15 BRPM | OXYGEN SATURATION: 100 %

## 2025-06-14 DIAGNOSIS — R55 SYNCOPE: ICD-10-CM

## 2025-06-14 LAB
ABSOLUTE EOSINOPHIL (OHS): 0.13 K/UL
ABSOLUTE MONOCYTE (OHS): 0.28 K/UL (ref 0.3–1)
ABSOLUTE NEUTROPHIL COUNT (OHS): 2.86 K/UL (ref 1.8–7.7)
ALBUMIN SERPL BCP-MCNC: 4.4 G/DL (ref 3.5–5.2)
ALP SERPL-CCNC: 54 UNIT/L (ref 40–150)
ALT SERPL W/O P-5'-P-CCNC: 17 UNIT/L (ref 10–44)
ANION GAP (OHS): 14 MMOL/L (ref 8–16)
AST SERPL-CCNC: 17 UNIT/L (ref 11–45)
B-HCG UR QL: NEGATIVE
BACTERIA #/AREA URNS HPF: ABNORMAL /HPF
BASOPHILS # BLD AUTO: 0.05 K/UL
BASOPHILS NFR BLD AUTO: 1 %
BILIRUB SERPL-MCNC: 0.8 MG/DL (ref 0.1–1)
BILIRUB UR QL STRIP.AUTO: NEGATIVE
BUN SERPL-MCNC: 7 MG/DL (ref 6–20)
CALCIUM SERPL-MCNC: 9.4 MG/DL (ref 8.7–10.5)
CHLORIDE SERPL-SCNC: 107 MMOL/L (ref 95–110)
CLARITY UR: CLEAR
CO2 SERPL-SCNC: 20 MMOL/L (ref 23–29)
COLOR UR AUTO: YELLOW
CREAT SERPL-MCNC: 0.7 MG/DL (ref 0.5–1.4)
ERYTHROCYTE [DISTWIDTH] IN BLOOD BY AUTOMATED COUNT: 12 % (ref 11.5–14.5)
GFR SERPLBLD CREATININE-BSD FMLA CKD-EPI: >60 ML/MIN/1.73/M2
GLUCOSE SERPL-MCNC: 97 MG/DL (ref 70–110)
GLUCOSE UR QL STRIP: NEGATIVE
HCT VFR BLD AUTO: 37.5 % (ref 37–48.5)
HGB BLD-MCNC: 13.2 GM/DL (ref 12–16)
HGB UR QL STRIP: ABNORMAL
HOLD SPECIMEN: NORMAL
HOLD SPECIMEN: NORMAL
HYALINE CASTS #/AREA URNS LPF: 0 /LPF (ref 0–1)
IMM GRANULOCYTES # BLD AUTO: 0.02 K/UL (ref 0–0.04)
IMM GRANULOCYTES NFR BLD AUTO: 0.4 % (ref 0–0.5)
KETONES UR QL STRIP: NEGATIVE
LEUKOCYTE ESTERASE UR QL STRIP: NEGATIVE
LYMPHOCYTES # BLD AUTO: 1.82 K/UL (ref 1–4.8)
MCH RBC QN AUTO: 32.1 PG (ref 27–31)
MCHC RBC AUTO-ENTMCNC: 35.2 G/DL (ref 32–36)
MCV RBC AUTO: 91 FL (ref 82–98)
MICROSCOPIC COMMENT: ABNORMAL
NITRITE UR QL STRIP: NEGATIVE
NUCLEATED RBC (/100WBC) (OHS): 0 /100 WBC
OHS QRS DURATION: 82 MS
OHS QTC CALCULATION: 450 MS
PH UR STRIP: 6 [PH]
PLATELET # BLD AUTO: 285 K/UL (ref 150–450)
PMV BLD AUTO: 10.7 FL (ref 9.2–12.9)
POCT GLUCOSE: 103 MG/DL (ref 70–110)
POTASSIUM SERPL-SCNC: 3.7 MMOL/L (ref 3.5–5.1)
PROT SERPL-MCNC: 7.6 GM/DL (ref 6–8.4)
PROT UR QL STRIP: ABNORMAL
RBC # BLD AUTO: 4.11 M/UL (ref 4–5.4)
RBC #/AREA URNS HPF: 10 /HPF (ref 0–4)
RELATIVE EOSINOPHIL (OHS): 2.5 %
RELATIVE LYMPHOCYTE (OHS): 35.3 % (ref 18–48)
RELATIVE MONOCYTE (OHS): 5.4 % (ref 4–15)
RELATIVE NEUTROPHIL (OHS): 55.4 % (ref 38–73)
SODIUM SERPL-SCNC: 141 MMOL/L (ref 136–145)
SP GR UR STRIP: >=1.03
UROBILINOGEN UR STRIP-ACNC: NEGATIVE EU/DL
WBC # BLD AUTO: 5.16 K/UL (ref 3.9–12.7)
WBC #/AREA URNS HPF: 1 /HPF (ref 0–5)

## 2025-06-14 PROCEDURE — 81025 URINE PREGNANCY TEST: CPT | Mod: ER | Performed by: EMERGENCY MEDICINE

## 2025-06-14 PROCEDURE — 93010 ELECTROCARDIOGRAM REPORT: CPT | Mod: ,,, | Performed by: INTERNAL MEDICINE

## 2025-06-14 PROCEDURE — 93005 ELECTROCARDIOGRAM TRACING: CPT | Mod: ER

## 2025-06-14 PROCEDURE — 99285 EMERGENCY DEPT VISIT HI MDM: CPT | Mod: 25,ER

## 2025-06-14 PROCEDURE — 87389 HIV-1 AG W/HIV-1&-2 AB AG IA: CPT | Performed by: EMERGENCY MEDICINE

## 2025-06-14 PROCEDURE — 82962 GLUCOSE BLOOD TEST: CPT | Mod: ER

## 2025-06-14 PROCEDURE — 85025 COMPLETE CBC W/AUTO DIFF WBC: CPT | Mod: ER | Performed by: EMERGENCY MEDICINE

## 2025-06-14 PROCEDURE — 81003 URINALYSIS AUTO W/O SCOPE: CPT | Mod: ER | Performed by: EMERGENCY MEDICINE

## 2025-06-14 PROCEDURE — 86803 HEPATITIS C AB TEST: CPT | Performed by: EMERGENCY MEDICINE

## 2025-06-14 PROCEDURE — 80053 COMPREHEN METABOLIC PANEL: CPT | Mod: ER | Performed by: EMERGENCY MEDICINE

## 2025-06-14 NOTE — DISCHARGE INSTRUCTIONS
As discussed, no significant problems discovered.      Most likely explanation is a vagal episode as discussed.      Follow up here or your doctor as needed.

## 2025-06-14 NOTE — ED PROVIDER NOTES
Encounter Date: 6/14/2025       History     Chief Complaint   Patient presents with    Loss of Consciousness     Pt on floor calling for  after having syncopal episode. C/o headache. Denies vomiting or diarrhea.      She is here with her  for an episode of syncope.  Underlying history is as well documented and reviewed, she is not particularly have a history of syncope and has no history of seizure disorder.  Not recently ill preceding today's events, denies preceding symptomatology or stresses.  She had just taken a bath, her  was in another room sleeping, she got out of the bathtub and remembers getting out to dry off and then the next thing she remembers was being on the floor calling for her .  He states he found her on the floor likely having struck her head in the left frontal region, seeming slightly confused at 1st but with no visualized seizure activity, bleeding, or other sign of serious injury.  She has some soreness in her left superior forehead region but denies tongue biting, incontinence, neck pain, or other complaints.  Mild nausea after the initial event.  She had some transient difficulty walking when she 1st stood up after this event but no other recurrent symptoms.    The history is provided by the patient and the spouse. No  was used.     Review of patient's allergies indicates:   Allergen Reactions    Fire ant Anaphylaxis     anaphylaxis    Cefprozil Hives    Poison ivy extract Rash     Past Medical History:   Diagnosis Date    Anxiety     Asthma     COVID-19 affecting pregnancy in second trimester 11/27/2020    Dysfunctional gallbladder     Gastritis     GERD (gastroesophageal reflux disease)     Gluten intolerance     Lactose intolerance      Past Surgical History:   Procedure Laterality Date    CHOLECYSTECTOMY      COLONOSCOPY  12/2018    ESOPHAGEAL MANOMETRY WITH MEASUREMENT OF IMPEDANCE N/A 6/12/2024    Procedure: MANOMETRY, ESOPHAGUS, WITH  IMPEDANCE MEASUREMENT;  Surgeon: Dilma Cash MD;  Location: [facility];  Service: Endoscopy;  Laterality: N/A;    ESOPHAGOGASTRODUODENOSCOPY N/A 2/5/2020    Procedure: EGD (ESOPHAGOGASTRODUODENOSCOPY);  Surgeon: Barbara Ruby MD;  Location: [facility] ENDO;  Service: Endoscopy;  Laterality: N/A;    ESOPHAGOGASTRODUODENOSCOPY N/A 6/12/2024    Procedure: EGD (ESOPHAGOGASTRODUODENOSCOPY);  Surgeon: Dilma Cash MD;  Location: [facility];  Service: Endoscopy;  Laterality: N/A;    PH MONITORING, ESOPHAGUS, WIRELESS, (OFF REFLUX MEDS) N/A 6/12/2024    Procedure: PH MONITORING, ESOPHAGUS, WIRELESS, (OFF REFLUX MEDS);  Surgeon: Dilma Cash MD;  Location: [facility];  Service: Endoscopy;  Laterality: N/A;    WRIST ARTHROPLASTY Bilateral 2009     Family History   Problem Relation Name Age of Onset    JESSICA disease Mother      JESSICA disease Maternal Grandmother      Thyroid disease Maternal Grandmother      Arthritis Maternal Grandmother      Heart disease Paternal Grandfather      Breast cancer Other great-grandmother         mat great GM    Colon cancer Neg Hx      Ovarian cancer Neg Hx      Thrombosis Neg Hx       Social History[1]  Review of Systems   Gastrointestinal:  Positive for nausea.   Neurological:  Positive for syncope and headaches.       Physical Exam     Initial Vitals [06/14/25 1227]   BP Pulse Resp Temp SpO2   (!) 155/78 63 18 97.5 °F (36.4 °C) 100 %      MAP       --         Physical Exam    Nursing note and vitals reviewed.  Constitutional: She appears well-developed and well-nourished. She is not diaphoretic. No distress.   HENT:   Head: Normocephalic and atraumatic.   Right Ear: External ear normal.   Left Ear: External ear normal. Mouth/Throat: Oropharynx is clear and moist. No oropharyngeal exudate.   Indicated left frontal forehead region is unremarkable to exam; no traumatic findings.   Eyes: Conjunctivae and EOM are normal. Pupils are equal, round, and reactive to light. Right eye  exhibits no discharge. Left eye exhibits no discharge. No scleral icterus.   Neck: Neck supple. No thyromegaly present. No tracheal deviation present. No JVD present.   Normal range of motion.  Cardiovascular:  Normal rate, regular rhythm, normal heart sounds and intact distal pulses.     Exam reveals no gallop and no friction rub.       No murmur heard.  Pulmonary/Chest: Breath sounds normal. No stridor. No respiratory distress. She has no wheezes. She has no rhonchi. She has no rales. She exhibits no tenderness.   Abdominal: Abdomen is soft. Bowel sounds are normal. She exhibits no distension and no mass. There is no abdominal tenderness. There is no rebound and no guarding.   Musculoskeletal:         General: No tenderness or edema. Normal range of motion.      Cervical back: Normal range of motion and neck supple.     Neurological: She is alert and oriented to person, place, and time. She has normal strength. She displays normal reflexes. No cranial nerve deficit or sensory deficit. GCS score is 15. GCS eye subscore is 4. GCS verbal subscore is 5. GCS motor subscore is 6.   Normal exam in detail.  Appearance not suggestive of postictal state.   Skin: Skin is warm and dry. No rash and no abscess noted. No erythema.   Psychiatric: She has a normal mood and affect. Her behavior is normal. Judgment and thought content normal.         ED Course   Procedures  Labs Reviewed   COMPREHENSIVE METABOLIC PANEL - Abnormal       Result Value    Sodium 141      Potassium 3.7      Chloride 107      CO2 20 (*)     Glucose 97      BUN 7      Creatinine 0.7      Calcium 9.4      Protein Total 7.6      Albumin 4.4      Bilirubin Total 0.8      ALP 54      AST 17      ALT 17      Anion Gap 14      eGFR >60     URINALYSIS, REFLEX TO URINE CULTURE - Abnormal    Color, UA Yellow      Appearance, UA Clear      pH, UA 6.0      Spec Grav UA >=1.030 (*)     Protein, UA 1+ (*)     Glucose, UA Negative      Ketones, UA Negative       Bilirubin, UA Negative      Blood, UA 2+ (*)     Nitrites, UA Negative      Urobilinogen, UA Negative      Leukocyte Esterase, UA Negative     CBC WITH DIFFERENTIAL - Abnormal    WBC 5.16      RBC 4.11      HGB 13.2      HCT 37.5      MCV 91      MCH 32.1 (*)     MCHC 35.2      RDW 12.0      Platelet Count 285      MPV 10.7      Nucleated RBC 0      Neut % 55.4      Lymph % 35.3      Mono % 5.4      Eos % 2.5      Basophil % 1.0      Imm Grans % 0.4      Neut # 2.86      Lymph # 1.82      Mono # 0.28 (*)     Eos # 0.13      Baso # 0.05      Imm Grans # 0.02     URINALYSIS MICROSCOPIC - Abnormal    RBC, UA 10 (*)     WBC, UA 1      Bacteria, UA Moderate (*)     Hyaline Casts, UA 0      Microscopic Comment       PREGNANCY TEST, URINE RAPID - Normal    hCG Qualitative, Urine Negative     HEP C VIRUS HOLD SPECIMEN    Extra Tube Hold for add-ons.     CBC W/ AUTO DIFFERENTIAL    Narrative:     The following orders were created for panel order CBC auto differential.  Procedure                               Abnormality         Status                     ---------                               -----------         ------                     CBC with Differential[[id number]]       Abnormal            Final result                 Please view results for these tests on the individual orders.   GREY TOP URINE HOLD    Extra Tube Hold for add-ons.     HEPATITIS C ANTIBODY   HIV 1 / 2 ANTIBODY   POCT GLUCOSE    POCT Glucose 103     POCT GLUCOSE MONITORING CONTINUOUS     EKG Readings: (Independently Interpreted)   Initial Reading: No STEMI. Rhythm: Normal Sinus Rhythm. Ectopy: No Ectopy.   Normal sinus rhythm at 62 beats per minute, possible old anterior infarction, no acute concerning change.       Imaging Results              CT Head Without Contrast (Final result)  Result time 06/14/25 14:00:13      Final result by Kike Hayes MD (06/14/25 14:00:13)                   Impression:      1.  Negative for acute intracranial  process. Negative for hemorrhage or skull fracture.  2.  Nonemergent findings as noted above.      All CT scans at [this location] are performed using dose modulation techniques as appropriate to a performed exam including the following: automated exposure control; adjustment of the mA and/or kV according to patient size (this includes techniques or standardized protocols for targeted exams where dose is matched to indication / reason for exam; i.e. extremities or head); use of iterative reconstruction technique.    Finalized on: 6/14/2025 2:00 PM By:  Kike Hayes MD  [facility]# [id number]      2025-06-14 14:02:[id number]     [facility]               Narrative:      EXAM: CT HEAD WITHOUT CONTRAST    CLINICAL HISTORY: Transient alteration of awareness    TECHNIQUE: Axial images through the brain and posterior fossa were obtained without the use of IV contrast.  Sagittal and coronal reconstructions are provided for review.    FINDINGS:  No comparison studies are available.  The ventricles are midline and the CSF spaces are normal. The gray-white matter junction is well preserved. Negative for intracranial vascular abnormalities. Negative for mass, mass effect, cerebral edema, hemorrhage or abnormal fluid collections.        The skull and scalp are     intact. Hypoplastic frontal sinuses.  Mild leftward nasal septal deviation.  The     paranasal sinuses, mastoid air cells, middle ears and ear canals are clear. The globes are intact.                                         Medications - No data to display    2:26 PM vital signs stable, no new complaints or problems, no arrhythmias, counseled in detail, ready for discharge with the following instructions:      As discussed, no significant problems discovered.      Most likely explanation is a vagal episode as discussed.      Follow up here or your doctor as needed.      Medical Decision Making  Single episode of uncomplicated syncope of uncertain etiology, likely vasovagal.  Stable,  reassuring workup and exam, ready for discharge.    Problems Addressed:  Syncope: acute illness or injury    Amount and/or Complexity of Data Reviewed  Labs: ordered. Decision-making details documented in ED Course.  Radiology: ordered. Decision-making details documented in ED Course.  ECG/medicine tests: ordered and independent interpretation performed. Decision-making details documented in ED Course.      Additional MDM:   Differential Diagnosis:   Vasovagal syncope, uncomplicated syncope, seizure among others                                    Clinical Impression:  Final diagnoses:  [R55] Syncope          ED Disposition Condition    Discharge Stable          ED Prescriptions    None       Follow-up Information       Follow up With Specialties Details Why Contact Info    [facility] - Emergency Dept Emergency Medicine  As needed [address]  Emergency Department  [facility] [id number] [phone]    Junito Del Valle MD Family Medicine  As needed [id number] [facility] [address] [phone]                     [1]   Social History  Tobacco Use    Smoking status: Never    Smokeless tobacco: Never   Substance Use Topics    Alcohol use: Not Currently     Comment: rare    Drug use: Never        Mc Byrd MD  06/14/25 1923

## 2025-06-15 LAB
HCV AB SERPL QL IA: NEGATIVE
HIV 1+2 AB+HIV1 P24 AG SERPL QL IA: NEGATIVE